# Patient Record
Sex: FEMALE | Race: WHITE | NOT HISPANIC OR LATINO | Employment: FULL TIME | URBAN - METROPOLITAN AREA
[De-identification: names, ages, dates, MRNs, and addresses within clinical notes are randomized per-mention and may not be internally consistent; named-entity substitution may affect disease eponyms.]

---

## 2017-01-22 ENCOUNTER — HOSPITAL ENCOUNTER (EMERGENCY)
Facility: CLINIC | Age: 32
Discharge: HOME | End: 2017-01-22
Attending: EMERGENCY MEDICINE | Admitting: EMERGENCY MEDICINE
Payer: COMMERCIAL

## 2017-01-22 VITALS
WEIGHT: 145 LBS | RESPIRATION RATE: 16 BRPM | DIASTOLIC BLOOD PRESSURE: 80 MMHG | BODY MASS INDEX: 24.75 KG/M2 | SYSTOLIC BLOOD PRESSURE: 125 MMHG | TEMPERATURE: 98.3 F | OXYGEN SATURATION: 100 % | HEART RATE: 110 BPM | HEIGHT: 64 IN

## 2017-01-22 DIAGNOSIS — S61.259A DOG BITE OF FINGER, INITIAL ENCOUNTER: ICD-10-CM
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
## 2017-01-22 DIAGNOSIS — L03.012 CELLULITIS OF FINGER OF LEFT HAND: ICD-10-CM

## 2017-01-22 PROCEDURE — 99283 EMERGENCY DEPT VISIT LOW MDM: CPT | Mod: Z6 | Performed by: EMERGENCY MEDICINE

## 2017-01-22 PROCEDURE — 99282 EMERGENCY DEPT VISIT SF MDM: CPT | Performed by: EMERGENCY MEDICINE

## 2017-01-22 ASSESSMENT — ENCOUNTER SYMPTOMS: WOUND: 1

## 2017-01-22 NOTE — ED AVS SNAPSHOT
[facility], Emergency Department    [address] [facility] [address]    Phone:  [phone]                                       Viktoria Guillen   MRN: [id number]    Department:  [facility], Emergency Department   Date of Visit:  1/22/2017           Patient Information     Date Of Birth          1985        Your diagnoses for this visit were:     Dog bite of finger, initial encounter     Cellulitis of finger of left hand        You were seen by Freddy Darnell MD.        Discharge Instructions       Start antibiotics as directed, warm soaks of the hand may be helpful  If you are not improving, you need to be seen again either in a clinic or the Emergency Department    24 Hour Appointment Hotline       To make an appointment at any [facility] clinic, call [phone] ([phone]). If you don't have a family doctor or clinic, we will help you find one. [facility] clinics are conveniently located to serve the needs of you and your family.             Review of your medicines      START taking        Dose / Directions Last dose taken    amoxicillin-clavulanate 875-125 MG per tablet   Commonly known as:  AUGMENTIN   Dose:  1 tablet   Quantity:  14 tablet        Take 1 tablet by mouth 2 times daily for 7 days   Refills:  0          Our records show that you are taking the medicines listed below. If these are incorrect, please call your family doctor or clinic.        Dose / Directions Last dose taken    AMBIEN PO   Dose:  10 mg   Indication:  Trouble Sleeping        Take 10 mg by mouth nightly as needed for sleep   Refills:  0        AMITRIPTYLINE HCL PO   Dose:  50 mg   Indication:  Depression        Take 50 mg by mouth daily   Refills:  0        NUVIGIL PO   Dose:  125 mg        Take 125 mg by mouth every morning   Refills:  0        PROPRANOLOL HCL PO   Dose:  10 mg        Take 10 mg by mouth as needed for high blood pressure   Refills:  0        XANAX PO   Dose:  0.5 mg   Indication:   "Feeling Anxious        Take 0.5 mg by mouth as needed for anxiety   Refills:  0                Prescriptions were sent or printed at these locations (1 Prescription)                   Other Prescriptions                Printed at Department/Unit printer (1 of 1)         amoxicillin-clavulanate (AUGMENTIN) 875-125 MG per tablet                Orders Needing Specimen Collection     None      Pending Results     No orders found from 2017 to 2017.            Pending Culture Results     No orders found from 2017 to 2017.            Thank you for choosing [facility]       Thank you for choosing [facility] for your care. Our goal is always to provide you with excellent care. Hearing back from our patients is one way we can continue to improve our services. Please take a few minutes to complete the written survey that you may receive in the mail after you visit with us. Thank you!        Daily Deals for [employer] Information     [employer] lets you send messages to your doctor, view your test results, renew your prescriptions, schedule appointments and more. To sign up, go to [email or web address] . Click on \"Log in\" on the left side of the screen, which will take you to the Welcome page. Then click on \"Sign up Now\" on the right side of the page.     You will be asked to enter the access code listed below, as well as some personal information. Please follow the directions to create your username and password.     Your access code is: [email or web address]  Expires: 2017  1:02 PM     Your access code will  in 90 days. If you need help or a new code, please call your [facility] clinic or [phone].        Care EveryWhere ID     This is your Care EveryWhere ID. This could be used by other organizations to access your [facility] medical records  [id number]        After Visit Summary       This is your record. Keep this with you and show to your community pharmacist(s) and doctor(s) at your next visit.                  "

## 2017-01-22 NOTE — ED AVS SNAPSHOT
[facility], [place], Emergency Department    [address] [place] [address]    Phone:  [phone]                                       Viktoria Guillen   MRN: [id number]    Department:  [facility], Emergency Department   Date of Visit:  1/22/2017           After Visit Summary Signature Page     I have received my discharge instructions, and my questions have been answered. I have discussed any challenges I see with this plan with the nurse or doctor.    ..........................................................................................................................................  Patient/Patient Representative Signature      ..........................................................................................................................................  Patient Representative Print Name and Relationship to Patient    ..................................................               ................................................  Date                                            Time    ..........................................................................................................................................  Reviewed by Signature/Title    ...................................................              ..............................................  Date                                                            Time

## 2017-01-22 NOTE — DISCHARGE INSTRUCTIONS
Start antibiotics as directed, warm soaks of the hand may be helpful  If you are not improving, you need to be seen again either in a clinic or the Emergency Department

## 2017-01-22 NOTE — ED NOTES
Arrived to ED d/t dog bite, was walking her dog and another dog jumped out of a window from a home and attacked her and her dog yesterday, bit her on the left thumb and third finger, tetanus shot last in 2012, per animal control and owner dog's rabbies vaccine up-to-date, VSS upon arrival

## 2017-01-22 NOTE — ED PROVIDER NOTES
"  History     Chief Complaint   Patient presents with     Dog Bite     HPI  Viktoria Guillen is a 31 year old otherwise healthy female who presents for evaluation of a dog bite. Patient reports she was walking her dog yesterday when another dog in the apartment complex jumped from a window and attacked her dog. She states she got in the middle of the dog fight because the other dog was significantly larger than hers. She complains of multiple abrasions to the dorsum and palmar aspect of left thumb as well as the dorsum of the second, third, and fourth digits with appearance of infection to the distal part of digits four and five. She also has a small non-infected abrasion on the dorsum of her right fifth digit. She denies allergies to medications. She is left hand dominant.     I have reviewed the Medications, Allergies, Past Medical and Surgical History, and Social History in the Epic system.  No past medical history on file.    No past surgical history on file.    No family history on file.    Social History   Substance Use Topics     Smoking status: Not on file     Smokeless tobacco: Not on file     Alcohol Use: Not on file     No current facility-administered medications for this encounter.     Current Outpatient Prescriptions   Medication     AMITRIPTYLINE HCL PO     ALPRAZolam (XANAX PO)     Zolpidem Tartrate (AMBIEN PO)     Armodafinil (NUVIGIL PO)     PROPRANOLOL HCL PO     amoxicillin-clavulanate (AUGMENTIN) 875-125 MG per tablet      No Known Allergies    Review of Systems   Skin: Positive for wound (multiple abrasions w/infection to bilateral hands).       Physical Exam   BP: 125/80 mmHg  Pulse: 110  Temp: 98.3  F (36.8  C)  Resp: 16  Height: 162.6 cm (5' 4\")  Weight: 65.772 kg (145 lb)  SpO2: 100 %  Physical Exam   Constitutional: She is oriented to person, place, and time. She appears well-developed and well-nourished. No distress.   Musculoskeletal:        Hands:  Multiple minor abrasions/bite wounds to " the left hand with evidence for early cellulitis to the distal portion of #4 and 5.  No wound that needs irrigation or repair   Neurological: She is alert and oriented to person, place, and time.   Nursing note and vitals reviewed.      ED Course     Procedures       12:22 PM  The patient was seen and examined by Dr. Darnell in Room 15.            Labs Ordered and Resulted from Time of ED Arrival Up to the Time of Departure from the ED - No data to display    Assessments & Plan (with Medical Decision Making)   31 year old female with multiple minor dog bite wounds, abrasions to both hands, but primarily to the left. One of the injuries clearly has early cellulitis with unlikely abscess. At this point my recommendation is Augmentin, warm soaks, and follow up in clinic in the next few days to assure resolution. Return to the emergency department if not improving.     I have reviewed the nursing notes.    I have reviewed the findings, diagnosis, plan and need for follow up with the patient.    New Prescriptions    AMOXICILLIN-CLAVULANATE (AUGMENTIN) 875-125 MG PER TABLET    Take 1 tablet by mouth 2 times daily for 7 days       Final diagnoses:   Dog bite of finger, initial encounter   Cellulitis of finger of left hand   I, Lizbeth Carrasco, am serving as a trained medical scribe to document services personally performed by Jason Darnell MD, based on the provider's statements to me.   I, Jason Darnell MD, was physically present and have reviewed and verified the accuracy of this note documented by Lizbeth Carrasco.      1/22/2017   [facility], [place], EMERGENCY DEPARTMENT      Freddy Darnell MD  01/22/17 [id number]

## 2023-01-09 ENCOUNTER — TRANSFERRED RECORDS (OUTPATIENT)
Dept: HEALTH INFORMATION MANAGEMENT | Facility: CLINIC | Age: 38
End: 2023-01-09

## 2023-01-09 ENCOUNTER — MEDICAL CORRESPONDENCE (OUTPATIENT)
Dept: HEALTH INFORMATION MANAGEMENT | Facility: CLINIC | Age: 38
End: 2023-01-09

## 2023-01-25 ENCOUNTER — TRANSFERRED RECORDS (OUTPATIENT)
Dept: HEALTH INFORMATION MANAGEMENT | Facility: CLINIC | Age: 38
End: 2023-01-25
Payer: COMMERCIAL

## 2023-01-27 ENCOUNTER — TELEPHONE (OUTPATIENT)
Dept: PSYCHIATRY | Facility: CLINIC | Age: 38
End: 2023-01-27
Payer: COMMERCIAL

## 2023-01-27 NOTE — TELEPHONE ENCOUNTER
"PSYCHIATRY CLINIC PHONE INTAKE     SERVICES REQUESTED / INTERESTED IN          Med Management    Presenting Problem and Brief History                              What would you like to be seen for? (brief description):    Pt was seen at [facility], graduated from  in 2019, is employee there now.  Originally diagnosed with depression and anxiety at 14. Diagnosed with gastroparesis by ED last October. Pt undergoing diagnostic testing with GI to determine if that's what's going on. Mental Health was made worse by the medical issues, and patient struggles more with mental health during the winter. Pt saw a psychologist in 2018/19, had a psychiatrist at home in [place], stopped seeing them in 2018 when he retired. Pt was doing ok and just had meds refilled through GP after that. But now mental health needs attention again. Pt noticing same older symptoms coming back and getting worse. Pt had insomnia the whole time, describes depression as getting \"really bad again,\" sees this as related to health issues going on as well.    Pt had been taking THC delta-8 gummies for sleep, but GI thinks that might have to do with current stomach issues and asked her to stop taking them. Patient reports it takes her around 3 hours to fall asleep most nights without it.    meds - not on any medications currently. Was taking AMITRIPTYLINE daily and xanax prn, also used to take Ambien nightly. Pt not necessarily looking to get back on these just wants to get a functional sleep schedule and handle depression.     Have you received a mental health diagnosis? Yes   Which one (s): depression, anxiety  Is there any history of developmental delay?  No   Are you currently seeing a mental health provider?  No            Who / month last seen:  Saw therapist regularly in [place] in 2016, but not since then  Do you have mental health records elsewhere?  Yes  Will you sign a release so we can obtain them?  Yes    Have you ever been hospitalized for " psychiatric reasons?  No  Describe:  na    Do you have current thoughts of self-harm?  No    Do you currently have thoughts of harming others?  No    Do you have any safety concerns? No   If yes to these, offer to reach out to a  for follow up.      Substance Use History     Do you have any history of alcohol / illicit drug use?  Yes  Describe:  delta 8 gummies for sleep, occasional drinking but nothing habitual  Have you ever received treatment for this?  No    Describe:  na     Social History     Who is the patient's a guardian?  self     Name / number: self  Have you had an ACT team in last 12 months?  No  Describe: na  OK to leave a detailed voicemail?  Yes    Would you be interested in learning more about research opportunities for which you or your child may qualify? We can connect you with a team member for more information.  Yes  If yes, send an inbasket message to Imani Duffy    Medical/ Surgical History                                 There is no problem list on file for this patient.         Medications             Current Outpatient Medications   Medication Sig Dispense Refill     ALPRAZolam (XANAX PO) Take 0.5 mg by mouth as needed for anxiety       AMITRIPTYLINE HCL PO Take 50 mg by mouth daily       Armodafinil (NUVIGIL PO) Take 125 mg by mouth every morning       PROPRANOLOL HCL PO Take 10 mg by mouth as needed for high blood pressure       Zolpidem Tartrate (AMBIEN PO) Take 10 mg by mouth nightly as needed for sleep           DISPOSITION      Phone screen completed with patient and scheduled for JENNIFER Kay     Jennifer Grajeda

## 2023-02-07 ENCOUNTER — HOSPITAL ENCOUNTER (OUTPATIENT)
Dept: NUCLEAR MEDICINE | Facility: CLINIC | Age: 38
Setting detail: NUCLEAR MEDICINE
Discharge: HOME | End: 2023-02-07
Attending: PHYSICIAN ASSISTANT | Admitting: PHYSICIAN ASSISTANT
Payer: COMMERCIAL

## 2023-02-07 DIAGNOSIS — R11.2 NAUSEA AND VOMITING: ICD-10-CM

## 2023-02-07 PROCEDURE — 343N000001 HC RX 343

## 2023-02-07 PROCEDURE — A9541 TC99M SULFUR COLLOID: HCPCS

## 2023-02-07 PROCEDURE — 78264 GASTRIC EMPTYING IMG STUDY: CPT | Mod: 26 | Performed by: RADIOLOGY

## 2023-02-07 PROCEDURE — 78264 GASTRIC EMPTYING IMG STUDY: CPT

## 2023-02-07 RX ADMIN — Medication 2 MILLICURIE: at 08:01

## 2023-02-16 ENCOUNTER — VIRTUAL VISIT (OUTPATIENT)
Dept: PSYCHIATRY | Facility: CLINIC | Age: 38
End: 2023-02-16
Attending: PSYCHIATRY & NEUROLOGY
Payer: COMMERCIAL

## 2023-02-16 DIAGNOSIS — F51.05 INSOMNIA DUE TO OTHER MENTAL DISORDER: ICD-10-CM

## 2023-02-16 DIAGNOSIS — F33.9 MAJOR DEPRESSIVE DISORDER, RECURRENT EPISODE WITH ANXIOUS DISTRESS (H): ICD-10-CM

## 2023-02-16 DIAGNOSIS — F33.1 MAJOR DEPRESSIVE DISORDER, RECURRENT EPISODE, MODERATE (H): Primary | ICD-10-CM

## 2023-02-16 DIAGNOSIS — F99 INSOMNIA DUE TO OTHER MENTAL DISORDER: ICD-10-CM

## 2023-02-16 PROCEDURE — 90792 PSYCH DIAG EVAL W/MED SRVCS: CPT | Mod: 95 | Performed by: PSYCHIATRY & NEUROLOGY

## 2023-02-16 RX ORDER — PROPRANOLOL HYDROCHLORIDE 10 MG/1
10 TABLET ORAL 2 TIMES DAILY
Qty: 60 TABLET | Refills: 1 | Status: SHIPPED | OUTPATIENT
Start: 2023-02-16 | End: 2023-08-15

## 2023-02-16 RX ORDER — ZOLPIDEM TARTRATE 10 MG/1
10 TABLET ORAL
Qty: 30 TABLET | Refills: 1 | Status: SHIPPED | OUTPATIENT
Start: 2023-02-16 | End: 2023-04-26

## 2023-02-16 RX ORDER — BUPROPION HYDROCHLORIDE 150 MG/1
150 TABLET ORAL EVERY MORNING
Qty: 30 TABLET | Refills: 1 | Status: SHIPPED | OUTPATIENT
Start: 2023-02-16 | End: 2023-04-28

## 2023-02-16 ASSESSMENT — ANXIETY QUESTIONNAIRES
GAD7 TOTAL SCORE: 9
7. FEELING AFRAID AS IF SOMETHING AWFUL MIGHT HAPPEN: NOT AT ALL
IF YOU CHECKED OFF ANY PROBLEMS ON THIS QUESTIONNAIRE, HOW DIFFICULT HAVE THESE PROBLEMS MADE IT FOR YOU TO DO YOUR WORK, TAKE CARE OF THINGS AT HOME, OR GET ALONG WITH OTHER PEOPLE: VERY DIFFICULT
8. IF YOU CHECKED OFF ANY PROBLEMS, HOW DIFFICULT HAVE THESE MADE IT FOR YOU TO DO YOUR WORK, TAKE CARE OF THINGS AT HOME, OR GET ALONG WITH OTHER PEOPLE?: VERY DIFFICULT
2. NOT BEING ABLE TO STOP OR CONTROL WORRYING: MORE THAN HALF THE DAYS
6. BECOMING EASILY ANNOYED OR IRRITABLE: NOT AT ALL
GAD7 TOTAL SCORE: 9
1. FEELING NERVOUS, ANXIOUS, OR ON EDGE: MORE THAN HALF THE DAYS
3. WORRYING TOO MUCH ABOUT DIFFERENT THINGS: MORE THAN HALF THE DAYS
GAD7 TOTAL SCORE: 9
7. FEELING AFRAID AS IF SOMETHING AWFUL MIGHT HAPPEN: NOT AT ALL
4. TROUBLE RELAXING: NEARLY EVERY DAY
5. BEING SO RESTLESS THAT IT IS HARD TO SIT STILL: NOT AT ALL

## 2023-02-16 ASSESSMENT — PATIENT HEALTH QUESTIONNAIRE - PHQ9
SUM OF ALL RESPONSES TO PHQ QUESTIONS 1-9: 17
SUM OF ALL RESPONSES TO PHQ QUESTIONS 1-9: 17
10. IF YOU CHECKED OFF ANY PROBLEMS, HOW DIFFICULT HAVE THESE PROBLEMS MADE IT FOR YOU TO DO YOUR WORK, TAKE CARE OF THINGS AT HOME, OR GET ALONG WITH OTHER PEOPLE: VERY DIFFICULT

## 2023-02-16 NOTE — PROGRESS NOTES
Video- Visit Details  Type of service:  video visit for diagnostic assessment  Time of service:    Date:  02/16/2023    Video Start Time:  12:51 PM        Video End Time:  2:09 PM    Reason for video visit:  Patient unable to travel due to Covid-19  Originating Site (patient location):  [facility]   Location- Patient's home  Distant Site (provider location):  Remote location  Mode of Communication:  Video Conference via [employer]       [facility]  Psychiatry Clinic  PSYCHIATRY NEW PATIENT EVALUATION     CARE TEAM:  PCP- No Ref-Primary, Physician   GI- at [facility].  Viktoria is a 37 year old who prefers the name Viktoria and uses pronouns she, her.     DIAGNOSES                                                                                        Major Depressive Disorder with Anxious Distress  Insomnia    ASSESSMENT                                                                                          Viktoria Guillen is a 37 year old female with symptoms supporting a diagnosis of MAJOR DEPRESSIVE DISORDER with anxious distress. She reports experiencing low mood most of the day, every day. She describes a lack of interest in her previous hobbies, such as rivera and keeping house plants, and an inability to feel antonio in those activities. Overall, she describes her emotions as muted. She describes an unintentional weight loss of 20 pounds since October 2022. She reports nightly insomnia. She describes feeling a lack of motivation and energy to accomplish tasks, such as making a dental appointment or doing housework, yet feels agitated and anxious most days, especially upon waking. While she continues to perform the tasks of her job, she is experiencing forgetfulness and difficulty concentrating at work and at home. She denies suicidal ideation or self-injurious thoughts. Viktoria has tried many medications since the age of 14, when she was first diagnosed with clinical depression. Based on what she remembers  "from past medication trials, will reinitiate medications that have previously worked well for her and without side effects at today's visit.     [place] was [place] today:  Indicates patient has not filled a controlled substance since February 2022..    PLAN                                                                                                       1) Meds    START Bupropion (Wellbutrin)  mg daily in the morning  START Propranolol 10 mg twice daily as needed for anxiety/panic (avoid taking if you feel lightheaded or dehydrated due to ongoing GI upset)  START Ambien 10 mg at bedtime as needed for sleep     2) Other:    Therapy- Recommend starting when you are able. Find a therapist in your network at [place] [facility]: [email or web address]    Labs- Per patient request, can defer labs until GI appointment in 2 weeks at [facility]. Recommended patient ask their GI provider to add on the following labs:  Folate   B12  Thyroid-stimulating Hormone  Vitamin D  Ferritin      3) RTC: In 3 weeks.    4) Crisis numbers are below and clinic after hours number is [phone]      CHIEF COMPLAINT                                      \" My depression just keeps worsening, and I need to feel better. \"   PERTINENT BACKGROUND                                         [initial eval 02/16/23]     Viktoria Guillen is a 37 year old female with a significant history of depressive episodes starting at age 14. She reports many past medication trials, as well as a long history of working with a psychotherapist. She has had several episodes of suicidal ideation over the years, with the most significant occurring in 2015. At that time, she had no attempts or concrete plan, but had daily intrusive SI thoughts. In 2013 she had one episode of self-injury that involved cutting her wrist with a steak knife. She reports many episodes of depression over the past 23 years. She also reports a history of anxiety.     Psych " "pertinent item history includes SIB     HISTORY OF PRESENT ILLNESS                                                      Most recent history began in October when she became physically quite ill. She reports daily gastrointestinal symptoms such as nausea and vomiting. She is still being worked up for this under the care of a gastroenterologist at [facility]. Along with the physical symptoms came increasingly more severe symptoms of depression and anxiety. She reports muted emotions, lack of motivation, difficulty concentrating and performing tasks at work. Her anxiety is worst in the morning upon waking. It is exacerbated by physical symptoms. She describes waking up feeling overwhelmed almost daily. She notices her heart racing in the mornings and generally feeling fidgety throughout the day. She reports regular episodes of panic. During these episodes, she experiences racing thoughts, shaking, crying, and feeling trapped. There is no clear trigger other than thinking about all the things she has been unable to accomplish due to her depressive symptoms.     Recent Symptoms:   Depression:  depressed mood, anhedonia, low energy, insomnia, appetite changes, weight changes, poor concentration /memory, feeling trapped and overwhelmed  Anxiety:  excessive worry and nervous/overwhelmed  Panic Attack:  peaks in < several  mins, occurs 2x per week, triggers are known, palpitations, diaphoresis and tremors    Adverse Med Effects:  See table below  Pertinent Negatives:  No   Recent Substance Use:    None reported    SOCIAL and FAMILY HISTORY                                                 per pt report         Family Hx:  Not obtained at this visit.     Social Hx:  Social Support - good social support from girlfriend \"great relationship\"    PAST PSYCH and SUBSTANCE USE HISTORY                      Psych:  Suicidal ideation - Past, none currently   SIB - One prior episode 2013, none currently      Substance Use:  No " additional substance use history.    MEDICAL HISTORY     There is no problem list on file for this patient.    Has been physically ill since October. Unintentional 20 pound weight loss since October. GI specialist through [facility].     [place] Provider: Alexandra (not PCP).    ALLERGIES: Patient has no known allergies.     MEDICAL REVIEW OF SYSTEMS                                                                  none in addition to that documented above    CURRENT MEDS       Current Outpatient Medications   Medication Sig Dispense Refill     buPROPion (WELLBUTRIN XL) 150 MG 24 hr tablet Take 1 tablet (150 mg) by mouth every morning for 60 days 30 tablet 1     ondansetron (ZOFRAN ODT) 4 MG ODT tab Take 4 mg by mouth every 6 hours as needed for nausea       pantoprazole (PROTONIX) 40 MG EC tablet Take 40 mg by mouth daily       propranolol (INDERAL) 10 MG tablet Take 1 tablet (10 mg) by mouth 2 times daily for 30 days 60 tablet 1     zolpidem (AMBIEN) 10 MG tablet Take 1 tablet (10 mg) by mouth nightly as needed for sleep 30 tablet 1     PROPRANOLOL HCL PO Take 10 mg by mouth as needed for high blood pressure (Patient not taking: Reported on 2/16/2023)       Zolpidem Tartrate (AMBIEN PO) Take 10 mg by mouth nightly as needed for sleep (Patient not taking: Reported on 2/16/2023)          Psychotropic Medication Trials      Medication Max Dose (mg) Dates / Duration Helpful? DC Reason / Adverse Effects?   Zoloft (sertraline) Unknown Unknown No None   Paxil (paroxetine) Unknown Unknown No Cause SI at the age of 14   Celexa (citalopram) Unknown Unknown No Full body rash/hives in patient's 20s    Lexapro (escitalopram) Unknown Unknown No None   Wellbutrin  (bupropion) 100mg IR Unknown Yes None   Remeron (mirtazapine) Unknown Unknown Yes Weight gain of 30 pounds in one month   Viibryd (vilazodone) Unknown Unknown Yes Reported long withdrawal    Elavil (amitriptyline) 25-50mg Unknown Yes Somewhat helpful at 25mg; described  a strange sensation of mental fog/slowing, but not sedation at 50mg   Abilify (aripriprazole) Unknown Unknown No Reports sedation     Neurontin (gabapentin) 300mg Unknown Unsure Unsure   Inderal (propranolol) 10mg Unknown Yes None   Ambien (zolpidem) 10mg Unknown Yes None   Melatonin Unknown Unknown No None   Xanax (alprazolam) Unknown Unknown Yes None   Vistaril / Atarax (hydroxyzine) 50mg Unknown No Used for sleep, cause some drowsiness, but not enough for sleep onset       VITALS                                                                                              There were no vitals taken for this visit.   MENTAL STATUS EXAM                                                             Alertness: alert   Appearance: adequately groomed  Behavior/Demeanor: cooperative, with good  eye contact   Speech: regular rate and rhythm  Language: intact  Psychomotor: normal or unremarkable  Mood: depressed  Affect: flat; congruent to: mood- yes, content- yes  Thought Process/Associations: unremarkable  Thought Content:  Reports none;  Denies suicidal & violent ideation and delusions  Perception:  Reports none;  Denies hallucinations  Insight: adequate  Judgment: appropriate  Cognition: does  appear grossly intact; formal cognitive testing was not done  Gait and Station: N/A (telehealth)    LABS and DATA     No flowsheet data found.    Answers for HPI/ROS submitted by the patient on 2/16/2023  If you checked off any problems, how difficult have these problems made it for you to do your work, take care of things at home, or get along with other people?: Very difficult  PHQ9 TOTAL SCORE: 17  LUBNA 7 TOTAL SCORE: 9      PSYCHOTROPIC DRUG INTERACTIONS   none     MANAGEMENT:  N/A    RISK STATEMENT for SAFETY   Viktoria did not appear to be an imminent safety risk to self or others.    TREATMENT RISK STATEMENT:  The risks, benefits, alternatives and potential adverse effects have been discussed and are understood by the pt. The pt  understands the risks of using street drugs or alcohol. There are no medical contraindications, the pt agrees to treatment with the ability to do so. The pt knows to call the clinic for any problems or to access emergency care if needed.  Medical and substance use concerns are documented above.  Psychotropic drug interaction check was done, including changes made today.    PROVIDER:  BENITO Cho CNP       MEDICAL DECISION MAKING        [facility]     Level of Medical Decision Making:   - At least 1 chronic problem that is not stable  - Engaged in prescription drug management during visit (discussed any medication benefits, side effects, alternatives, etc.)  Number of unique external sources from which notes were reviewed: 1 - CareEverywhere information from [facility]  reviewed

## 2023-02-16 NOTE — PROGRESS NOTES
Viktoria Guillen is a 37 year old who is being evaluated via a billable video visit.      Pt will join video visit via: Lalalama  If there are problems joining the visit, send backup video invite via: Text to preferred phone: [phone]    Reason for telehealth visit: Patient has requested telehealth visit    Originating location (patient location): Patient's home    Will anyone else be joining the visit? No

## 2023-02-16 NOTE — NURSING NOTE
Is the patient currently in the state of [place]? YES    Visit mode:VIDEO    If the visit is dropped, the patient can be reconnected by: VIDEO VISIT: Text to cell phone: [phone]    Will anyone else be joining the visit? NO      How would you like to obtain your AVS? MyChart    Are changes needed to the allergy or medication list? YES: Pt is taking Protonix and Zofran only    Comments or concerns regarding today's visit: New Patient visit

## 2023-02-17 RX ORDER — ONDANSETRON 4 MG/1
4 TABLET, ORALLY DISINTEGRATING ORAL EVERY 6 HOURS PRN
COMMUNITY
Start: 2023-01-06

## 2023-02-17 RX ORDER — PANTOPRAZOLE SODIUM 40 MG/1
40 TABLET, DELAYED RELEASE ORAL DAILY
COMMUNITY
Start: 2023-01-26 | End: 2024-07-30

## 2023-02-17 NOTE — PATIENT INSTRUCTIONS
Treatment Plan Today:     1) Meds    START Bupropion (Wellbutrin)  mg daily in the morning  START Propranolol 10 mg twice daily as needed for anxiety/panic (avoid taking if you feel lightheaded or dehydrated due to ongoing GI upset)  START Ambien 10 mg at bedtime as needed for sleep     2) Other:    Therapy- Recommend starting when you are able. Find a therapist in your network at [place] Fast Tracker: [email or web address]    Labs- Per your request, can defer labs until GI appointment in 2 weeks at [facility]. Recommend you have the following labs drawn. These will need to be ordered by your [facility] provider and results shared. If they are unwilling to order these, please let me know, and I will order them in the [facility] system to be drawn before our next appointment.  Folate   B12  Thyroid-stimulating Hormone  Vitamin D  Ferritin    Check blood pressure either at the pharmacy or [facility] and send me the result via Mindlikes. Discontinue propranolol if you are experiencing low blood pressure (below 100/60), dehydration or lightheadedness.    3) RTC: In 3 weeks.    4) Crisis numbers are below and clinic after hours number is [phone]     ------------------------------------------------------------------------    Thank you for coming to the [facility]    Lab Testing:  If you had lab testing today and your results are reassuring or normal they will be mailed to you or sent through Mindlikes within 7 days. If the lab tests need quick action we will call you with the results. The phone number we will call with results is # [phone] (home) . If this is not the best number please call our clinic and change the number.    Medication Refills:  If you need any refills please call your pharmacy and they will contact us. Our fax number for refills is [phone].   Three business days of notice are needed for general medication refill requests.   Five business days  of notice are needed for controlled substance refill requests.   If you need to change to a different pharmacy, please contact the new pharmacy directly. The new pharmacy will help you get your medications transferred.     Scheduling:  If you have any concerns about today's visit or wish to schedule another appointment please call our office during normal business hours [phone] (8-5:00 M-F)    Contact Us:  Please call [phone] during business hours (8-5:00 M-F).  If after clinic hours, or on the weekend, please call  [phone].    Patient Bill of Rights:  [email or web address]       MENTAL HEALTH CRISIS RESOURCES:  For a emergency help, please call [phone] or go to the nearest Emergency Department.     Emergency Walk-In Options:   EmPATH Unit @ [facility] ([place]): [phone] - Specialized mental health emergency area designed to be calming  [facility] ([place]): [phone]  [facility] ([place]): [phone]  [facility]): [phone]    [facility] Crisis Information:   [place]: [phone]  [place]: [phone]  [place] ([place]) - Adult: [phone]     Child: [phone]  [place] - Adult: [phone]     Child: [phone]  [place]: [phone]  List of all [facility] resources:   https://[place].gov/dhs/people-we-serve/adults/health-care/mental-health/resources/crisis-contacts.jsp    National Crisis Information:   Crisis Text Line: Text  [place]  to 948860  National Suicide Prevention Lifeline: 5-469-100-DPTU (1-266.156.4233)       For online chat options, visit https://suicidepreventionlifeline.org/chat/  Poison Control Center: 1-647.961.6204  Trans Lifeline: 1-815.333.7012 - Hotline for transgender people of all ages  The Pacheco Project: 4-945-616-6678 - Hotline for LGBT youth     For Non-Emergency Support:   Fast Tracker: Mental Health & Substance Use Disorder  Resources -   https://www.P[employer]n.org/       Again thank you for choosing [facility] and please let us know how we can best partner with you to improve you and your family's health.    You may be receiving a survey regarding this appointment. We would love to have your feedback, both positive and negative. The survey is done by an external company, so your answers are anonymous.

## 2023-04-26 ENCOUNTER — MYC REFILL (OUTPATIENT)
Dept: PSYCHIATRY | Facility: CLINIC | Age: 38
End: 2023-04-26
Payer: COMMERCIAL

## 2023-04-26 DIAGNOSIS — F51.05 INSOMNIA DUE TO OTHER MENTAL DISORDER: ICD-10-CM

## 2023-04-26 DIAGNOSIS — F99 INSOMNIA DUE TO OTHER MENTAL DISORDER: ICD-10-CM

## 2023-04-26 NOTE — TELEPHONE ENCOUNTER
Last seen: 02/16/2023  RTC: 3 weeks  Cancel: None  No-show: None  Next appt: 05/16/2023     Incoming refill from Patient via [employer]    Medication requested:   Pending Prescriptions:                       Disp   Refills    zolpidem (AMBIEN) 10 MG tablet            30 tab*1            Sig: Take 1 tablet (10 mg) by mouth nightly as needed           for sleep        Last refill per       From chart note:   START Ambien 10 mg at bedtime as needed for sleep      Medication sent to provider for review.

## 2023-04-27 RX ORDER — ZOLPIDEM TARTRATE 10 MG/1
10 TABLET ORAL
Qty: 30 TABLET | Refills: 0 | Status: SHIPPED | OUTPATIENT
Start: 2023-04-27 | End: 2023-06-16

## 2023-04-28 ENCOUNTER — MYC REFILL (OUTPATIENT)
Dept: PSYCHIATRY | Facility: CLINIC | Age: 38
End: 2023-04-28
Payer: COMMERCIAL

## 2023-04-28 DIAGNOSIS — F33.9 MAJOR DEPRESSIVE DISORDER, RECURRENT EPISODE WITH ANXIOUS DISTRESS (H): ICD-10-CM

## 2023-04-28 RX ORDER — BUPROPION HYDROCHLORIDE 150 MG/1
150 TABLET ORAL EVERY MORNING
Qty: 30 TABLET | Refills: 0 | Status: SHIPPED | OUTPATIENT
Start: 2023-04-28 | End: 2023-08-15

## 2023-04-28 NOTE — TELEPHONE ENCOUNTER
Last seen: 2/16/23  RTC: 3 weeks  Cancel: none  No-show: none  Next appt: 5/16/23     Incoming refill from Patient via [employer]    Medication requested:   Pending Prescriptions:                       Disp   Refills    buPROPion (WELLBUTRIN XL) 150 MG 24 hr ta*30 tab*1            Sig: Take 1 tablet (150 mg) by mouth every morning      From chart note:   START Bupropion (Wellbutrin)  mg daily in the morning     Medication refill approved per refill protocol. Courtesy refill as pt is out of RTC timeframe.        Patient tolerated procedure well. Wound class 1. Dressing clean dry and intact. Denies pain or discomfort.

## 2023-05-06 ENCOUNTER — HEALTH MAINTENANCE LETTER (OUTPATIENT)
Age: 38
End: 2023-05-06

## 2023-05-07 ENCOUNTER — MYC REFILL (OUTPATIENT)
Dept: PSYCHIATRY | Facility: CLINIC | Age: 38
End: 2023-05-07
Payer: COMMERCIAL

## 2023-05-07 DIAGNOSIS — F33.9 MAJOR DEPRESSIVE DISORDER, RECURRENT EPISODE WITH ANXIOUS DISTRESS (H): ICD-10-CM

## 2023-05-08 RX ORDER — BUPROPION HYDROCHLORIDE 150 MG/1
150 TABLET ORAL EVERY MORNING
Qty: 30 TABLET | Refills: 0 | OUTPATIENT
Start: 2023-05-08

## 2023-05-08 NOTE — TELEPHONE ENCOUNTER
Bupropion (Wellbutrin XL) 150 mg last refilled on 4/28/23. Refill declined as too soon to refill.  [email or web address] message sent to pt with update. Next visit scheduled for 5/16/23.

## 2023-05-12 NOTE — TELEPHONE ENCOUNTER
Writer spoke with [id number] - [place], [place] - [address] AT [facility] [address] and was told that the refill of bupropion 150mg that was sent on 4/28/23 hadn't yet been filled. Instructed pharmacy to fill medication.

## 2023-05-16 ENCOUNTER — VIRTUAL VISIT (OUTPATIENT)
Dept: PSYCHIATRY | Facility: CLINIC | Age: 38
End: 2023-05-16
Attending: PSYCHIATRY & NEUROLOGY
Payer: COMMERCIAL

## 2023-05-16 DIAGNOSIS — F33.9 MAJOR DEPRESSIVE DISORDER, RECURRENT EPISODE WITH ANXIOUS DISTRESS (H): Primary | ICD-10-CM

## 2023-05-16 PROCEDURE — 99214 OFFICE O/P EST MOD 30 MIN: CPT | Mod: VID | Performed by: PSYCHIATRY & NEUROLOGY

## 2023-05-16 RX ORDER — BUPROPION HYDROCHLORIDE 300 MG/1
300 TABLET ORAL EVERY MORNING
Qty: 30 TABLET | Refills: 11 | Status: SHIPPED | OUTPATIENT
Start: 2023-05-16 | End: 2023-11-21

## 2023-05-16 ASSESSMENT — PATIENT HEALTH QUESTIONNAIRE - PHQ9
SUM OF ALL RESPONSES TO PHQ QUESTIONS 1-9: 15
10. IF YOU CHECKED OFF ANY PROBLEMS, HOW DIFFICULT HAVE THESE PROBLEMS MADE IT FOR YOU TO DO YOUR WORK, TAKE CARE OF THINGS AT HOME, OR GET ALONG WITH OTHER PEOPLE: VERY DIFFICULT
SUM OF ALL RESPONSES TO PHQ QUESTIONS 1-9: 15

## 2023-05-16 NOTE — PATIENT INSTRUCTIONS
PLAN                                                                                                       1) Meds    INCREASE Bupropion (Wellbutrin) XL to 300 mg daily in the morning  CONTINUE Propranolol 10 mg twice daily as needed for anxiety/panic (avoid taking if you feel lightheaded or dehydrated due to ongoing GI upset)  CONTINUE  Ambien 10 mg at bedtime as needed for sleep     2) Other:  Labs- Ordered in the [facility]. You can have them drawn at any [facility] lab or request your GI provider to order them through [facility]. Recommended the following labs:  Folate   B12  Thyroid-stimulating Hormone  Vitamin D  Ferritin    3) RTC: In 8-12 weeks.    4) Crisis numbers listed below: For crisis resources, please see the information at the end of this document.    Patient Education      Thank you for coming to the [facility] [place] [facility].     Lab Testing:  If you had lab testing today and your results are reassuring or normal they will be mailed to you or sent through [employer] within 7 days. If the lab tests need quick action we will call you with the results. The phone number we will call with results is # [phone]. If this is not the best number please call our clinic and change the number.     Medication Refills:  If you need any refills please call your pharmacy and they will contact us. Our fax number for refills is [phone].   Three business days of notice are needed for general medication refill requests.   Five business days of notice are needed for controlled substance refill requests.   If you need to change to a different pharmacy, please contact the new pharmacy directly. The new pharmacy will help you get your medications transferred.     Contact Us:  Please call [phone] during business hours (8-5:00 M-F).   If you have medication related questions after clinic hours, or on the weekend, please call [phone].     Financial Assistance  [phone]   Medical Records [phone]       MENTAL HEALTH CRISIS RESOURCES:  For a emergency help, please call 911 or go to the nearest Emergency Department.     Emergency Walk-In Options:   EmPATH Unit @ [facility] ([place]): [phone] - Specialized mental health emergency area designed to be calming  [facility] ([place]): [phone]  [facility] Acute Psychiat[facility] Services ([place]): [phone]  [facility] ([place]): [phone]    [facility] Crisis Information:   [place]: [phone]  [place]: [phone]  [place] ([place]) - Adult: [phone]     Child: [phone]  [place] - Adult: [phone]     Child: [phone]  [place]: [phone]  List of all [facility] resources:   https://[place].[facility]/dhs/people-we-serve/adults/health-care/mental-health/resources/crisis-contacts.jsp    National Crisis Information:   Crisis Text Line: Text  [place]  to 127779  Suicide & Crisis Lifeline: 988  National Suicide Prevention Lifeline: 6-304-729-TALK (1-668.437.1354)       For online chat options, visit https://suicidepreventionlifeline.org/chat/  Poison Control Center: 1-539.783.2881  Trans Lifeline: 1-179.547.1301 - Hotline for transgender people of all ages  The Pacheco Project: 4-402-237-5127 - Hotline for LGBT youth     For Non-Emergency Support:   Fast Tracker: Mental Health & Substance Use Disorder Resources -   https://www.[employer].org/

## 2023-05-16 NOTE — PROGRESS NOTES
[facility]  Psychiatry Clinic  PSYCHIATRY PROGRESS NOTE     CARE TEAM:  PCP- No Ref-Primary, Physician   GI- at [facility].  Viktoria is a 37 year old who prefers the name Viktoria and uses pronouns she, her.     DIAGNOSES                                                                                        Major Depressive Disorder with Anxious Distress  Insomnia    ASSESSMENT                                                                                          Viktoria Guillen is a 37 year old female with symptoms supporting a diagnosis of MAJOR DEPRESSIVE DISORDER with anxious distress. While her mood has improved, she is still experiencing low mood most every day. She has had some increase of interest in her previous hobbies, such gardening, but finds it difficult to see projects through due to her fatigue. She continues to describe her emotions as muted. Sleep and nutrition have improved. It appears that Wellbutrin  mg has improved her mood somewhat,with room for more therapeutic benefit. Her anxiety has remained consistent if not improved. Will increase Wellbutrin XL to 300 mg and order labs which were not drawn since the last appointment.     MNPMP was checked today:  Indicates taking controlled medication as prescribed.    PLAN                                                                                                       1) Meds    INCREASE Bupropion (Wellbutrin) XL to 300 mg daily in the morning  CONTINUE Propranolol 10 mg twice daily as needed for anxiety/panic (avoid taking if you feel lightheaded or dehydrated due to ongoing GI upset)  CONTINUE  Ambien 10 mg at bedtime as needed for sleep     2) Other:    Labs- Ordered in the [facility]. You can have them drawn at any [facility] lab or request your GI provider to order them through [facility]. Recommended the following labs:  Folate   B12  Thyroid-stimulating Hormone  Vitamin D  Ferritin      3) RTC: In 8-12  "weeks.    4) Crisis numbers in AVS.     CHIEF COMPLAINT                                      \" My mental health is starting to feel better, but I'm continuing to manage physical health issues. \"     PERTINENT BACKGROUND                                         [initial eval 02/16/23]     Viktoria Guillen is a 37 year old female with a significant history of depressive episodes starting at age 14. She reports many past medication trials, as well as a long history of working with a psychotherapist. She has had several episodes of suicidal ideation over the years, with the most significant occurring in 2015. At that time, she had no attempts or concrete plan, but had daily intrusive SI thoughts. In 2013 she had one episode of self-injury that involved cutting her wrist with a steak knife. She reports many episodes of depression over the past 23 years. She also reports a history of anxiety.     Psych pertinent item history includes SIB     HISTORY OF PRESENT ILLNESS                                                      Definitely notice a difference with Wellbutrin. Getting up and having more energy to do things. Still feels more apathetic than she would like to be. Reports feeling somewhat emotionally numb to certain things. Some increase physical and mental fatigue over the last few weeks, including muscle fatigue and weakness. Sleep has much improved since the last appointment. She reports using the Ambien intermittently, which is helpful. She uses it approximately three times per week. Nutrition has improved. She continues to take in most of her meals as liquid. Drinking Jameson Instant Breakfast or Ensure Plus for meals. Also eats yogurt and pasta. Walks around during her work shifts, but does not participate in intentional movement. Still fatigues easily when she has a big activity. Denies panic attacks since the last appointment, but does endorse intermittent anxiety- mostly related to frustration with her health. " "Does not contribute any increase in anxiety to the Wellbutrin. Has used propranolol for anxiety on occasion. States \"it definitely helps\". Reports significant improvement in sleep. Reports using Ambien sparingly, and finding it helpful. Had some labs checked at [facility], but not all labs that were previously indicated.     Recent Symptoms:   Depression:  depressed mood, low energy, insomnia, feeling trapped and overwhelmed- some improvements in mood and energy since last visit  Anxiety:  excessive worry and nervous/overwhelmed  Panic Attack:  peaks in < several  mins, occurs 2x per week, triggers are known, palpitations, diaphoresis and tremors - none since last visit    Adverse Med Effects:  See table below  Pertinent Negatives:  No   Recent Substance Use:    None reported    SOCIAL and FAMILY HISTORY                                                 per pt report         Family Hx:  Not obtained at this visit.     Social Hx:  Social Support - good social support from girlfriend \"great relationship\"    PAST PSYCH and SUBSTANCE USE HISTORY                      Psych:  Suicidal ideation - Past, none currently   SIB - One prior episode 2013, none currently      Substance Use:  No additional substance use history.    MEDICAL HISTORY     There is no problem list on file for this patient.    Has been physically ill since October. Unintentional 20 pound weight loss since October. GI specialist through [facility].     [place] Provider: Alexandra (not PCP).    ALLERGIES: Patient has no known allergies.     MEDICAL REVIEW OF SYSTEMS                                                                  none in addition to that documented above    CURRENT MEDS       Current Outpatient Medications   Medication Sig Dispense Refill     buPROPion (WELLBUTRIN XL) 150 MG 24 hr tablet Take 1 tablet (150 mg) by mouth every morning 30 tablet 0     ondansetron (ZOFRAN ODT) 4 MG ODT tab Take 4 mg by mouth every 6 hours as needed for nausea       " pantoprazole (PROTONIX) 40 MG EC tablet Take 40 mg by mouth daily       zolpidem (AMBIEN) 10 MG tablet Take 1 tablet (10 mg) by mouth nightly as needed for sleep 30 tablet 0     propranolol (INDERAL) 10 MG tablet Take 1 tablet (10 mg) by mouth 2 times daily for 30 days 60 tablet 1     PROPRANOLOL HCL PO Take 10 mg by mouth as needed for high blood pressure (Patient not taking: Reported on 5/16/2023)       Zolpidem Tartrate (AMBIEN PO) Take 10 mg by mouth nightly as needed for sleep (Patient not taking: Reported on 5/16/2023)          Psychotropic Medication Trials      Medication Max Dose (mg) Dates / Duration Helpful? DC Reason / Adverse Effects?   Zoloft (sertraline) Unknown Unknown No None   Paxil (paroxetine) Unknown Unknown No Cause SI at the age of 14   Celexa (citalopram) Unknown Unknown No Full body rash/hives in patient's 20s    Lexapro (escitalopram) Unknown Unknown No None   Wellbutrin  (bupropion) 100mg IR Unknown Yes None   Remeron (mirtazapine) Unknown Unknown Yes Weight gain of 30 pounds in one month   Viibryd (vilazodone) Unknown Unknown Yes Reported long withdrawal    Elavil (amitriptyline) 25-50mg Unknown Yes Somewhat helpful at 25mg; described a strange sensation of mental fog/slowing, but not sedation at 50mg   Abilify (aripriprazole) Unknown Unknown No Reports sedation     Neurontin (gabapentin) 300mg Unknown Unsure Unsure   Inderal (propranolol) 10mg Unknown Yes None   Ambien (zolpidem) 10mg Unknown Yes None   Melatonin Unknown Unknown No None   Xanax (alprazolam) Unknown Unknown Yes None   Vistaril / Atarax (hydroxyzine) 50mg Unknown No Used for sleep, cause some drowsiness, but not enough for sleep onset       VITALS                                                                                              There were no vitals taken for this visit.   MENTAL STATUS EXAM                                                             Alertness: alert   Appearance: adequately  groomed  Behavior/Demeanor: cooperative, with good  eye contact   Speech: regular rate and rhythm  Language: intact  Psychomotor: normal or unremarkable  Mood: depressed  Affect: flat; congruent to: mood- yes, content- yes  Thought Process/Associations: unremarkable  Thought Content:  Reports none;  Denies suicidal & violent ideation and delusions  Perception:  Reports none;  Denies hallucinations  Insight: adequate  Judgment: appropriate  Cognition: does  appear grossly intact; formal cognitive testing was not done  Gait and Station: N/A (telehealth)    LABS and DATA         2/16/2023    12:58 PM 5/16/2023     7:42 AM   PHQ   PHQ-9 Total Score 17 15   Q9: Thoughts of better off dead/self-harm past 2 weeks Not at all Not at all       Answers for HPI/ROS submitted by the patient on 2/16/2023  If you checked off any problems, how difficult have these problems made it for you to do your work, take care of things at home, or get along with other people?: Very difficult  PHQ9 TOTAL SCORE: 17  LUBNA 7 TOTAL SCORE: 9      PSYCHOTROPIC DRUG INTERACTIONS   none     MANAGEMENT:  N/A    RISK STATEMENT for SAFETY   Viktoria did not appear to be an imminent safety risk to self or others.    TREATMENT RISK STATEMENT:  The risks, benefits, alternatives and potential adverse effects have been discussed and are understood by the pt. The pt understands the risks of using street drugs or alcohol. There are no medical contraindications, the pt agrees to treatment with the ability to do so. The pt knows to call the clinic for any problems or to access emergency care if needed.  Medical and substance use concerns are documented above.  Psychotropic drug interaction check was done, including changes made today.    PROVIDER:  BENITO Cho CNP       MEDICAL DECISION MAKING        (Daniel .PSYCHBILLMDM)     Level of Medical Decision Making:   - At least 1 chronic problem that is not stable  - Engaged in prescription drug management  during visit (discussed any medication benefits, side effects, alternatives, etc.)  Number of unique external sources from which notes were reviewed: 1 - CareEverywhere information from [facility]  reviewed

## 2023-05-16 NOTE — NURSING NOTE
Is the patient currently in the state of [place]? YES    Visit mode:VIDEO    If the visit is dropped, the patient can be reconnected by: VIDEO VISIT: Send to e-mail at: [email or web address]    Will anyone else be joining the visit? NO      How would you like to obtain your AVS? MyChart    Are changes needed to the allergy or medication list? NO    Reason for visit: Video Visit

## 2023-05-16 NOTE — PROGRESS NOTES
Virtual Visit Details    Type of service:  Video Visit     Originating Location (pt. Location): Other Work in [place]  Distant Location (provider location):  On-site  Platform used for Video Visit: Shereen

## 2023-06-16 ENCOUNTER — MYC REFILL (OUTPATIENT)
Dept: PSYCHIATRY | Facility: CLINIC | Age: 38
End: 2023-06-16
Payer: COMMERCIAL

## 2023-06-16 DIAGNOSIS — F99 INSOMNIA DUE TO OTHER MENTAL DISORDER: ICD-10-CM

## 2023-06-16 DIAGNOSIS — F51.05 INSOMNIA DUE TO OTHER MENTAL DISORDER: ICD-10-CM

## 2023-06-16 NOTE — TELEPHONE ENCOUNTER
Last Seen 5/16/23  RTC 8-12 weeks  Cancel 0  No-Show 0    Next Appt none    Incoming Refill From pt request via MyChart    Medication Requested   zolpidem (AMBIEN) 10 MG tablet    Directions   Route: Take 1 tablet (10 mg) by mouth nightly as needed for sleep - Oral    Qty 30    Last Refill 4/27/23    Medication Refill Pended Per Refill Protocol

## 2023-06-16 NOTE — TELEPHONE ENCOUNTER
Per [place]  zolpidem (AMBIEN) 10 MG tablet 4/27 #30, 2/16 #30    Writer routed to provider for approval.

## 2023-06-20 RX ORDER — ZOLPIDEM TARTRATE 10 MG/1
10 TABLET ORAL
Qty: 30 TABLET | Refills: 0 | Status: SHIPPED | OUTPATIENT
Start: 2023-06-20 | End: 2023-08-02

## 2023-08-02 ENCOUNTER — MYC REFILL (OUTPATIENT)
Dept: PSYCHIATRY | Facility: CLINIC | Age: 38
End: 2023-08-02
Payer: COMMERCIAL

## 2023-08-02 DIAGNOSIS — F51.05 INSOMNIA DUE TO OTHER MENTAL DISORDER: ICD-10-CM

## 2023-08-02 DIAGNOSIS — F33.9 MAJOR DEPRESSIVE DISORDER, RECURRENT EPISODE WITH ANXIOUS DISTRESS (H): ICD-10-CM

## 2023-08-02 DIAGNOSIS — F99 INSOMNIA DUE TO OTHER MENTAL DISORDER: ICD-10-CM

## 2023-08-02 RX ORDER — PROPRANOLOL HYDROCHLORIDE 10 MG/1
10 TABLET ORAL 2 TIMES DAILY
Qty: 60 TABLET | Refills: 1 | Status: CANCELLED | OUTPATIENT
Start: 2023-08-02

## 2023-08-02 NOTE — TELEPHONE ENCOUNTER
Will address in another encounter. Awaiting response back from patient.    Urszula Hurtado on 8/2/2023 at 9:44 AM

## 2023-08-07 RX ORDER — ZOLPIDEM TARTRATE 10 MG/1
10 TABLET ORAL
Qty: 30 TABLET | Refills: 0 | Status: SHIPPED | OUTPATIENT
Start: 2023-08-07 | End: 2023-09-14

## 2023-08-15 ENCOUNTER — VIRTUAL VISIT (OUTPATIENT)
Dept: PSYCHIATRY | Facility: CLINIC | Age: 38
End: 2023-08-15
Attending: PSYCHIATRY & NEUROLOGY
Payer: COMMERCIAL

## 2023-08-15 DIAGNOSIS — F33.9 MAJOR DEPRESSIVE DISORDER, RECURRENT EPISODE WITH ANXIOUS DISTRESS (H): ICD-10-CM

## 2023-08-15 PROCEDURE — 99214 OFFICE O/P EST MOD 30 MIN: CPT | Mod: 95 | Performed by: PSYCHIATRY & NEUROLOGY

## 2023-08-15 RX ORDER — PROCHLORPERAZINE MALEATE 10 MG
10 TABLET ORAL EVERY 6 HOURS PRN
COMMUNITY
Start: 2023-06-14 | End: 2023-11-01

## 2023-08-15 RX ORDER — PROPRANOLOL HYDROCHLORIDE 10 MG/1
10 TABLET ORAL 2 TIMES DAILY
Qty: 60 TABLET | Refills: 1 | Status: SHIPPED | OUTPATIENT
Start: 2023-08-15 | End: 2023-11-17

## 2023-08-15 RX ORDER — SIMETHICONE 125 MG
125 TABLET,CHEWABLE ORAL 4 TIMES DAILY PRN
COMMUNITY
Start: 2023-06-02

## 2023-08-15 RX ORDER — METOCLOPRAMIDE 5 MG/1
5 TABLET ORAL 2 TIMES DAILY PRN
COMMUNITY
Start: 2023-03-03 | End: 2024-04-22

## 2023-08-15 RX ORDER — DOCUSATE SODIUM 250 MG
250 CAPSULE ORAL DAILY PRN
COMMUNITY
Start: 2023-03-02

## 2023-08-15 RX ORDER — DICYCLOMINE HYDROCHLORIDE 10 MG/1
10 CAPSULE ORAL 3 TIMES DAILY PRN
COMMUNITY
Start: 2023-06-15 | End: 2024-04-22

## 2023-08-15 ASSESSMENT — ANXIETY QUESTIONNAIRES
GAD7 TOTAL SCORE: 16
3. WORRYING TOO MUCH ABOUT DIFFERENT THINGS: NEARLY EVERY DAY
6. BECOMING EASILY ANNOYED OR IRRITABLE: SEVERAL DAYS
5. BEING SO RESTLESS THAT IT IS HARD TO SIT STILL: NOT AT ALL
IF YOU CHECKED OFF ANY PROBLEMS ON THIS QUESTIONNAIRE, HOW DIFFICULT HAVE THESE PROBLEMS MADE IT FOR YOU TO DO YOUR WORK, TAKE CARE OF THINGS AT HOME, OR GET ALONG WITH OTHER PEOPLE: EXTREMELY DIFFICULT
4. TROUBLE RELAXING: NEARLY EVERY DAY
7. FEELING AFRAID AS IF SOMETHING AWFUL MIGHT HAPPEN: NEARLY EVERY DAY
GAD7 TOTAL SCORE: 16
2. NOT BEING ABLE TO STOP OR CONTROL WORRYING: NEARLY EVERY DAY
1. FEELING NERVOUS, ANXIOUS, OR ON EDGE: NEARLY EVERY DAY

## 2023-08-15 ASSESSMENT — PATIENT HEALTH QUESTIONNAIRE - PHQ9
SUM OF ALL RESPONSES TO PHQ QUESTIONS 1-9: 22
10. IF YOU CHECKED OFF ANY PROBLEMS, HOW DIFFICULT HAVE THESE PROBLEMS MADE IT FOR YOU TO DO YOUR WORK, TAKE CARE OF THINGS AT HOME, OR GET ALONG WITH OTHER PEOPLE: EXTREMELY DIFFICULT
SUM OF ALL RESPONSES TO PHQ QUESTIONS 1-9: 22

## 2023-08-15 NOTE — PROGRESS NOTES
Virtual Visit Details    Type of service:  Video Visit     Originating Location (pt. Location): Other work    Distant Location (provider location):  On-site  Platform used for Video Visit: Shereen

## 2023-08-15 NOTE — PATIENT INSTRUCTIONS
PLAN                                                                                                       1) Meds    CONTINUE Bupropion (Wellbutrin) XL to 300 mg daily in the morning  CONTINUE Propranolol 10 mg twice daily as needed for anxiety/panic (avoid taking if you feel lightheaded or dehydrated due to ongoing GI upset)  CONTINUE  Ambien 10 mg at bedtime as needed for sleep     2) Other: None today.    3) RTC: In 4 weeks. MTM prior to return visit.     4) Crisis numbers: For crisis resources, please see the information at the end of this document.    Patient Education      Thank you for coming to the [facility] [place] [facility].     Lab Testing:  If you had lab testing today and your results are reassuring or normal they will be mailed to you or sent through [facility] within 7 days. If the lab tests need quick action we will call you with the results. The phone number we will call with results is # [phone]. If this is not the best number please call our clinic and change the number.     Medication Refills:  If you need any refills please call your pharmacy and they will contact us. Our fax number for refills is [phone].   Three business days of notice are needed for general medication refill requests.   Five business days of notice are needed for controlled substance refill requests.   If you need to change to a different pharmacy, please contact the new pharmacy directly. The new pharmacy will help you get your medications transferred.     Contact Us:  Please call [phone] during business hours (8-5:00 M-F).   If you have medication related questions after clinic hours, or on the weekend, please call [phone].     Financial Assistance [phone]   Medical Records [phone]       MENTAL HEALTH CRISIS RESOURCES:  For a emergency help, please call 911 or go to the nearest Emergency Department.     Emergency Walk-In Options:   [facility] Unit @ [place]  [facility] ([place]): [phone] - Specialized mental health emergency area designed to be calming  [facility] ([place]): [phone]  [facility] ([place]): [phone]  [facility] ([place]): [phone]    [facility] Crisis Information:   [place]: [phone]  [place]: [phone]  [place] ([place]) - Adult: [phone]     Child: [phone]  [place] - Adult: [phone]     Child: [phone]  [place]: [phone]  List of all [employer] resources:   https://[place].gov/dhs/people-we-serve/adults/health-care/mental-health/resources/crisis-contacts.jsp    National Crisis Information:   Crisis Text Line: Text  [place]  to 534195  Suicide & Crisis Lifeline: 988  National Suicide Prevention Lifeline: 6-671-873-TALK (1-471.834.2324)       For online chat options, visit https://suicidepreventionlifeline.org/chat/  Poison Control Center: 1-593.183.9532  Trans Lifeline: 1-808.663.5683 - Hotline for transgender people of all ages  The [place] Project: 6-075-897-5264 - Hotline for LGBT youth     For Non-Emergency Support:   Fast Tracker: Mental Health & Substance Use Disorder Resources -   https://www.[employer].org/

## 2023-08-15 NOTE — NURSING NOTE
Is the patient currently in the state of [place]? YES    Visit mode:VIDEO    If the visit is dropped, the patient can be reconnected by: VIDEO VISIT: Text to cell phone: [phone]    Will anyone else be joining the visit? NO      How would you like to obtain your AVS? MyChart    Are changes needed to the allergy or medication list? NO    Reason for visit: RECHECK

## 2023-08-15 NOTE — PROGRESS NOTES
[facility]  Psychiatry Clinic  PSYCHIATRY PROGRESS NOTE     CARE TEAM:  PCP- No Ref-Primary, Physician   GI- at [facility] .  Viktoria is a 38 year old who prefers the name Viktoria and uses pronouns she, her.     DIAGNOSES                                                                                        Major Depressive Disorder with Anxious Distress  Insomnia    ASSESSMENT                                                                                          Viktoria Guillen is a 37 year old female with symptoms supporting a diagnosis of MAJOR DEPRESSIVE DISORDER with anxious distress. While she initially felt some improvement on Wellbutrin  mg, she now reports significant increase in depressed mood since June. She continues to experience anxiety and fatigue as well. Sleep has worsened, but nutrition has improved. Viktoria requests a referral to explore alternative options for managing depression at this time. She is interested in both TMS and ketamine. We also discussed medication changes that might help in the meantime. Reviewed past medication trials. From Viktoria's memory of what worked best with the least amount of side effects, she is most inclined to restart amitriptyline 25 mg + perphenazine. Will start with MTM referral to review past medication trials and potential interactions with current medication regimen, while simultaneously pursing a TMS referral.     MNPMP was checked today:  Indicates taking controlled medication as prescribed.    PLAN                                                                                                       1) Meds    CONTINUE Bupropion (Wellbutrin) XL to 300 mg daily in the morning  CONTINUE Propranolol 10 mg twice daily as needed for anxiety/panic (avoid taking if you feel lightheaded or dehydrated due to ongoing GI upset)  CONTINUE  Ambien 10 mg at bedtime as needed for sleep     2) Other: None today.    3) RTC: In 4 weeks.     4) Crisis  "numbers in AVS.     CHIEF COMPLAINT                                      \" Things have been getting worse these past two months \"     PERTINENT BACKGROUND                                         [initial eval 02/16/23]     Viktoria Guillen is a 37 year old female with a significant history of depressive episodes starting at age 14. She reports many past medication trials, as well as a long history of working with a psychotherapist. She has had several episodes of suicidal ideation over the years, with the most significant occurring in 2015. At that time, she had no attempts or concrete plan, but had daily intrusive SI thoughts. In 2013 she had one episode of self-injury that involved cutting her wrist with a steak knife. She reports many episodes of depression over the past 23 years. She also reports a history of anxiety.     Psych pertinent item history includes SIB     HISTORY OF PRESENT ILLNESS                                                      Since the last visit, Viktoria reports she was hospitalized for four days in early June for dehydration, hypokalemia, and nausea/vomiting. Continues taking Wellbutrin  mg, but notes that her depression has significantly increased. \"The last two months have been awful.\" Endorses depressed mood, difficulty getting out of bed, decreased antonio in life, low energy. Goes to bed around 2100, but taking over an hour to fall asleep even with Ambien. Waking up throughout the night 2-3 times. Sometimes has difficulty falling back to sleep. Having difficulty getting out of bed I the morning. Not feeling well rested and experiencing increase morning nausea. Anxiety is increased, which Viktoria attributes to worry over losing her job due to missing work for health related concerns. Endorses some \"anxiety attacks\", but denies full blown panic attacks. Notes heart pounding, inability to concentrate, racing/panicky thoughts. Notes that her depression and anxiety have continued to worsen since " "her hospitalization in June. Does report her day-to-day physical health has improved some since hospitalization, though she still has a day of debilitating nausea once every 2-3 weeks. Oral intake has improved since starting new nausea medications. Drinking 2 ensure per day and eating small meals. On her feet at work most of the day and has a five minute walk to and from her parking spot each day. Feels quite fatigued at the end of each work day. Having difficulty concentrating on things she would like to do at home.     Recent Symptoms:   Depression:  depressed mood, low energy, insomnia, feeling trapped and overwhelmed-  Anxiety:  excessive worry and nervous/overwhelmed  Panic Attack:  peaks in < several  mins, occurs 2x per week, triggers are known, palpitations, diaphoresis and tremors - none since last visit, see HPI for details     Adverse Med Effects:  See table below  Pertinent Negatives:  No   Recent Substance Use:    None reported    SOCIAL and FAMILY HISTORY                                                 per pt report         Family Hx:  Not obtained at this visit.     Social Hx:  Social Support - good social support from girlfriend \"great relationship\"    PAST PSYCH and SUBSTANCE USE HISTORY                      Psych:  Suicidal ideation - Past, none currently   SIB - One prior episode 2013, none currently      Substance Use:  No additional substance use history.    MEDICAL HISTORY     There is no problem list on file for this patient.    Has been physically ill since October. Unintentional 20 pound weight loss since October. GI specialist through [facility].     [place] Provider: Alexandra (not PCP).    ALLERGIES: Patient has no known allergies.     MEDICAL REVIEW OF SYSTEMS                                                                  none in addition to that documented above    CURRENT MEDS       Current Outpatient Medications   Medication Sig Dispense Refill    buPROPion (WELLBUTRIN XL) 150 MG " 24 hr tablet Take 1 tablet (150 mg) by mouth every morning 30 tablet 0    buPROPion (WELLBUTRIN XL) 300 MG 24 hr tablet Take 1 tablet (300 mg) by mouth every morning 30 tablet 11    ondansetron (ZOFRAN ODT) 4 MG ODT tab Take 4 mg by mouth every 6 hours as needed for nausea      pantoprazole (PROTONIX) 40 MG EC tablet Take 40 mg by mouth daily      propranolol (INDERAL) 10 MG tablet Take 1 tablet (10 mg) by mouth 2 times daily for 30 days 60 tablet 1    PROPRANOLOL HCL PO Take 10 mg by mouth as needed for high blood pressure (Patient not taking: Reported on 5/16/2023)      zolpidem (AMBIEN) 10 MG tablet Take 1 tablet (10 mg) by mouth nightly as needed for sleep 30 tablet 0    Zolpidem Tartrate (AMBIEN PO) Take 10 mg by mouth nightly as needed for sleep (Patient not taking: Reported on 5/16/2023)          Psychotropic Medication Trials      Medication Max Dose (mg) Dates / Duration Helpful? DC Reason / Adverse Effects?   Zoloft (sertraline) Unknown Unknown No None   Paxil (paroxetine) Unknown Unknown No Cause SI at the age of 14   Celexa (citalopram) Unknown Unknown No Full body rash/hives in patient's 20s    Lexapro (escitalopram) Unknown Unknown No None   Wellbutrin  (bupropion) 100mg IR Unknown Yes None   Remeron (mirtazapine) Unknown Unknown Yes Weight gain of 30 pounds in one month   Viibryd (vilazodone) Unknown Unknown Yes Reported long withdrawal    Elavil (amitriptyline) 25-50mg Unknown Yes Somewhat helpful at 25mg; described a strange sensation of mental fog/slowing, but not sedation at 50mg   Abilify (aripriprazole) Unknown Unknown No Reports sedation     Neurontin (gabapentin) 300mg Unknown Unsure Unsure   Inderal (propranolol) 10mg Unknown Yes None   Ambien (zolpidem) 10mg Unknown Yes None   Melatonin Unknown Unknown No None   Xanax (alprazolam) Unknown Unknown Yes None   Vistaril / Atarax (hydroxyzine) 50mg Unknown No Used for sleep, cause some drowsiness, but not enough for sleep onset       VITALS                                                                                               There were no vitals taken for this visit.   MENTAL STATUS EXAM                                                             Alertness: alert   Appearance: adequately groomed  Behavior/Demeanor: cooperative, with good  eye contact   Speech: regular rate and rhythm  Language: intact  Psychomotor: normal or unremarkable  Mood: depressed  Affect: flat; congruent to: mood- yes, content- yes  Thought Process/Associations: unremarkable  Thought Content:  Reports none;  Denies suicidal & violent ideation and delusions  Perception:  Reports none;  Denies hallucinations  Insight: adequate  Judgment: appropriate  Cognition: does  appear grossly intact; formal cognitive testing was not done  Gait and Station: N/A (telehealth)    LABS and DATA         2/16/2023    12:58 PM 5/16/2023     7:42 AM 8/15/2023     8:34 AM   PHQ   PHQ-9 Total Score 17 15 22   Q9: Thoughts of better off dead/self-harm past 2 weeks Not at all Not at all Several days   F/U: Thoughts of suicide or self-harm   No   F/U: Safety concerns   No       PSYCHOTROPIC DRUG INTERACTIONS   none     MANAGEMENT:  N/A    RISK STATEMENT for SAFETY   Viktoria did not appear to be an imminent safety risk to self or others.    TREATMENT RISK STATEMENT:  The risks, benefits, alternatives and potential adverse effects have been discussed and are understood by the pt. The pt understands the risks of using street drugs or alcohol. There are no medical contraindications, the pt agrees to treatment with the ability to do so. The pt knows to call the clinic for any problems or to access emergency care if needed.  Medical and substance use concerns are documented above.  Psychotropic drug interaction check was done, including changes made today.    PROVIDER:  BENITO Cho CNP       MEDICAL DECISION MAKING        ([facility])     Level of Medical Decision Making:   - At least 1  chronic problem that is not stable  - Engaged in prescription drug management during visit (discussed any medication benefits, side effects, alternatives, etc.)  Number of unique external sources from which notes were reviewed: 1 - CareEverywhere information from [facility]  reviewed      Answers submitted by the patient for this visit:  Patient Health Questionnaire (Submitted on 8/15/2023)  If you checked off any problems, how difficult have these problems made it for you to do your work, take care of things at home, or get along with other people?: Extremely difficult  PHQ9 TOTAL SCORE: 22  LUBNA-7 (Submitted on 8/15/2023)  LUBNA 7 TOTAL SCORE: 16

## 2023-08-17 ENCOUNTER — MYC MEDICAL ADVICE (OUTPATIENT)
Dept: PSYCHIATRY | Facility: CLINIC | Age: 38
End: 2023-08-17
Payer: COMMERCIAL

## 2023-09-11 ENCOUNTER — MYC MEDICAL ADVICE (OUTPATIENT)
Dept: PSYCHIATRY | Facility: CLINIC | Age: 38
End: 2023-09-11
Payer: COMMERCIAL

## 2023-09-11 NOTE — TELEPHONE ENCOUNTER
Writer spoke with Viktoria via phone for check in. She reports worsening anxiety and intrusive thoughts over the past week. Reports anxiety about driving is becoming 'obsessive,' and she feels hypervigilant and defensive whenever she drives. She hasn't experienced this in the past.  Endorses intrusive thoughts about self harm, such as turning her engine on in closed garage. Denies having thoughts about dying or suicide, and denies active SI/SIB. Viktoria reports she would never act on these thoughts or harm herself. Reports protective factors and support system of her partner and dog.   We reviewed a safety plan of telling her partner or calling clinic if these thoughts become worse. We reviewed that she would call 911 or go to the ER if she experiences active SI. Reviewed [facility] and [facility] as options. Viktoria reports that she feels safe at home right now, but would follow safety plan if things worsen.  She has an MTM appointment on 9/13 and visit with Marixa Kay on 9/15/23; Viktoria confirmed she will be attending both visits. She agreed to contact clinic with any concerns or questions prior to visit.

## 2023-09-12 ENCOUNTER — HOSPITAL ENCOUNTER (OUTPATIENT)
Facility: CLINIC | Age: 38
Setting detail: OBSERVATION
Discharge: HOME | End: 2023-09-13
Attending: EMERGENCY MEDICINE | Admitting: EMERGENCY MEDICINE
Payer: COMMERCIAL

## 2023-09-12 DIAGNOSIS — F51.01 PRIMARY INSOMNIA: ICD-10-CM

## 2023-09-12 DIAGNOSIS — F33.2 MDD (MAJOR DEPRESSIVE DISORDER), RECURRENT SEVERE, WITHOUT PSYCHOSIS (H): ICD-10-CM

## 2023-09-12 DIAGNOSIS — R45.851 SUICIDAL IDEATION: ICD-10-CM

## 2023-09-12 DIAGNOSIS — F41.9 ANXIETY: ICD-10-CM

## 2023-09-12 PROBLEM — K58.9 SPASTIC COLON: Status: ACTIVE | Noted: 2020-04-01

## 2023-09-12 PROBLEM — R10.13 DYSPEPSIA: Status: ACTIVE | Noted: 2023-01-06

## 2023-09-12 PROBLEM — F98.8 ADD (ATTENTION DEFICIT DISORDER): Status: ACTIVE | Noted: 2020-04-01

## 2023-09-12 PROBLEM — K21.9 GERD (GASTROESOPHAGEAL REFLUX DISEASE): Status: ACTIVE | Noted: 2020-04-01

## 2023-09-12 PROBLEM — K59.04 CHRONIC IDIOPATHIC CONSTIPATION: Status: ACTIVE | Noted: 2023-08-08

## 2023-09-12 PROBLEM — F32.A DEPRESSION, UNSPECIFIED DEPRESSION TYPE: Status: ACTIVE | Noted: 2023-09-12

## 2023-09-12 PROBLEM — K31.84 GASTROPARESIS: Status: ACTIVE | Noted: 2023-09-12

## 2023-09-12 PROBLEM — F40.10 SOCIAL ANXIETY DISORDER: Status: ACTIVE | Noted: 2020-04-01

## 2023-09-12 PROCEDURE — 250N000013 HC RX MED GY IP 250 OP 250 PS 637: Performed by: STUDENT IN AN ORGANIZED HEALTH CARE EDUCATION/TRAINING PROGRAM

## 2023-09-12 PROCEDURE — 99285 EMERGENCY DEPT VISIT HI MDM: CPT | Mod: 25

## 2023-09-12 PROCEDURE — 90791 PSYCH DIAGNOSTIC EVALUATION: CPT

## 2023-09-12 PROCEDURE — 250N000013 HC RX MED GY IP 250 OP 250 PS 637

## 2023-09-12 PROCEDURE — G0378 HOSPITAL OBSERVATION PER HR: HCPCS

## 2023-09-12 RX ORDER — TRAZODONE HYDROCHLORIDE 50 MG/1
50 TABLET, FILM COATED ORAL
Status: DISCONTINUED | OUTPATIENT
Start: 2023-09-12 | End: 2023-09-13

## 2023-09-12 RX ORDER — ACETAMINOPHEN 325 MG/1
650 TABLET ORAL EVERY 4 HOURS PRN
Status: DISCONTINUED | OUTPATIENT
Start: 2023-09-12 | End: 2023-09-13 | Stop reason: HOSPADM

## 2023-09-12 RX ORDER — LANOLIN ALCOHOL/MO/W.PET/CERES
3 CREAM (GRAM) TOPICAL
Status: DISCONTINUED | OUTPATIENT
Start: 2023-09-12 | End: 2023-09-13 | Stop reason: HOSPADM

## 2023-09-12 RX ORDER — OLANZAPINE 10 MG/1
10 TABLET, ORALLY DISINTEGRATING ORAL 2 TIMES DAILY PRN
Status: DISCONTINUED | OUTPATIENT
Start: 2023-09-12 | End: 2023-09-13 | Stop reason: HOSPADM

## 2023-09-12 RX ORDER — ONDANSETRON 4 MG/1
4 TABLET, ORALLY DISINTEGRATING ORAL EVERY 6 HOURS PRN
Status: DISCONTINUED | OUTPATIENT
Start: 2023-09-12 | End: 2023-09-13 | Stop reason: HOSPADM

## 2023-09-12 RX ORDER — OLANZAPINE 10 MG/2ML
10 INJECTION, POWDER, FOR SOLUTION INTRAMUSCULAR 2 TIMES DAILY PRN
Status: DISCONTINUED | OUTPATIENT
Start: 2023-09-12 | End: 2023-09-13 | Stop reason: HOSPADM

## 2023-09-12 RX ORDER — BUPROPION HYDROCHLORIDE 300 MG/1
300 TABLET ORAL EVERY MORNING
Status: DISCONTINUED | OUTPATIENT
Start: 2023-09-13 | End: 2023-09-13 | Stop reason: HOSPADM

## 2023-09-12 RX ORDER — DOCUSATE SODIUM 250 MG
250 CAPSULE ORAL AT BEDTIME
Status: DISCONTINUED | OUTPATIENT
Start: 2023-09-12 | End: 2023-09-12

## 2023-09-12 RX ORDER — SIMETHICONE 125 MG
125 TABLET,CHEWABLE ORAL 4 TIMES DAILY PRN
Status: DISCONTINUED | OUTPATIENT
Start: 2023-09-12 | End: 2023-09-13 | Stop reason: HOSPADM

## 2023-09-12 RX ORDER — PROPRANOLOL HYDROCHLORIDE 10 MG/1
10 TABLET ORAL 2 TIMES DAILY
Status: DISCONTINUED | OUTPATIENT
Start: 2023-09-12 | End: 2023-09-13 | Stop reason: HOSPADM

## 2023-09-12 RX ORDER — HYDROXYZINE HYDROCHLORIDE 50 MG/1
50 TABLET, FILM COATED ORAL EVERY 6 HOURS PRN
Status: DISCONTINUED | OUTPATIENT
Start: 2023-09-12 | End: 2023-09-13 | Stop reason: HOSPADM

## 2023-09-12 RX ORDER — METOCLOPRAMIDE 5 MG/1
5 TABLET ORAL 2 TIMES DAILY PRN
Status: DISCONTINUED | OUTPATIENT
Start: 2023-09-12 | End: 2023-09-13 | Stop reason: HOSPADM

## 2023-09-12 RX ORDER — PROCHLORPERAZINE MALEATE 10 MG
10 TABLET ORAL EVERY 6 HOURS PRN
Status: DISCONTINUED | OUTPATIENT
Start: 2023-09-12 | End: 2023-09-13 | Stop reason: HOSPADM

## 2023-09-12 RX ORDER — IBUPROFEN 600 MG/1
600 TABLET, FILM COATED ORAL EVERY 6 HOURS PRN
Status: DISCONTINUED | OUTPATIENT
Start: 2023-09-12 | End: 2023-09-13 | Stop reason: HOSPADM

## 2023-09-12 RX ORDER — ZOLPIDEM TARTRATE 5 MG/1
10 TABLET ORAL
Status: DISCONTINUED | OUTPATIENT
Start: 2023-09-12 | End: 2023-09-13 | Stop reason: HOSPADM

## 2023-09-12 RX ORDER — DICYCLOMINE HYDROCHLORIDE 10 MG/1
10 CAPSULE ORAL 3 TIMES DAILY PRN
Status: DISCONTINUED | OUTPATIENT
Start: 2023-09-12 | End: 2023-09-13 | Stop reason: HOSPADM

## 2023-09-12 RX ORDER — PANTOPRAZOLE SODIUM 40 MG/1
40 TABLET, DELAYED RELEASE ORAL DAILY
Status: DISCONTINUED | OUTPATIENT
Start: 2023-09-13 | End: 2023-09-13 | Stop reason: HOSPADM

## 2023-09-12 RX ADMIN — PROPRANOLOL HYDROCHLORIDE 10 MG: 10 TABLET ORAL at 22:16

## 2023-09-12 RX ADMIN — DOCUSATE SODIUM 250 MG: 50 CAPSULE, LIQUID FILLED ORAL at 22:16

## 2023-09-12 RX ADMIN — ZOLPIDEM TARTRATE 10 MG: 5 TABLET ORAL at 22:16

## 2023-09-12 ASSESSMENT — ACTIVITIES OF DAILY LIVING (ADL)
ADLS_ACUITY_SCORE: 35
ADLS_ACUITY_SCORE: 35

## 2023-09-12 NOTE — ED NOTES
[facility]  ED to EMPATH Checklist:      Goal for EMPATH: Depression management and Suicidality    Current Behavior: Calm and Cooperative    Safety Concerns: Suicidal, with a plan to drive car into garage and let it run    Legal Hold Status: Voluntary    Medically Cleared by ED provider: Yes    Patient Therapeutically Searched: Not searched - Currently in triage    Belongings: Remain with patient    Independent Ambulation at Baseline: Yes/No: Yes    Participates in Care/Conversation: Yes/No: Yes    Patient Informed about EMPATH: Yes/No: Yes    DEC: Ordered and pending    Patient Ready to be Transferred to EMPATH? Yes/No: Yes

## 2023-09-12 NOTE — ED PROVIDER NOTES
History     Chief Complaint:  Mental Health Problem       HPI   Viktoria Guillen is a 38 year old female with a history of major depression and anxious distress insomnia disorder comes and for worsening suicidal ideation.  She said she would think about driving her car into the garage and letting it run with the door closed.  She has had a lot of depression and anxiety has been increased recently.  She feels like she is not functioning well and has been missing work.  Sleep has been very poor appetite is down.  She has chronic GI problems so does not eat a lot anyway and takes medication for that.  She was scheduled to see her psychiatrist Friday but did not feel she could make it that long and feels she needs some help.  She is willing to go to [facility].  She has had no illness recently, no chemical use or alcohol use.      Independent Historian:   None - Patient Only    Review of External Notes:   Reviewed the virtual visit with psychiatry from 8/15/2023      Medications:    buPROPion (WELLBUTRIN XL) 300 MG 24 hr tablet  dicyclomine (BENTYL) 10 MG capsule  docusate sodium (DSS) 250 MG capsule  metoclopramide (REGLAN) 5 MG tablet  ondansetron (ZOFRAN ODT) 4 MG ODT tab  pantoprazole (PROTONIX) 40 MG EC tablet  prochlorperazine (COMPAZINE) 10 MG tablet  propranolol (INDERAL) 10 MG tablet  PROPRANOLOL HCL PO  simethicone (MYLICON) 125 MG chewable tablet  zolpidem (AMBIEN) 10 MG tablet  Zolpidem Tartrate (AMBIEN PO)        Past Medical History:    Depression with anxiety   Chronic idiopathic constipation   ADD (attention deficit disorder)   Dyspepsia   Essential hypertension   Gastroparesis   GERD (gastroesophageal reflux disease)   Insomnia   Sleep disorder   Social anxiety disorder   Spastic colon       Past Surgical History:    No past surgical history on file.     Physical Exam   Patient Vitals for the past 24 hrs:   BP Temp Temp src Pulse Resp SpO2   09/12/23 1820 117/75 98.2  F (36.8  C) Temporal 92 18 98 %         Physical Exam  Nursing note and vitals reviewed.  Constitutional:  Appears comfortable.   HENT:    Mucus membranes are moist.  Eyes:    Conjunctivae are normal. No jaundice.  Pupils equal  Cardiovascular:  Normal rate, regular rhythm.      Normal heart sounds.     No murmur heard.  Pulmonary/Chest:  Breath sounds clear. No respiratory distress.     No stridor.   Musculoskeletal: No edema.  Moves all extremities equally.  Neurological:   Alert and appropriate. No focal weakness.  Gait is normal.  Skin:    Skin is warm and dry. No rash noted. No diaphoresis.   Psychiatric:   Somewhat flat affect, seems depressed.  Makes good eye contact and thought processes seem clear and not delusional.      Emergency Department Course       Emergency Department Course & Assessments:    PSS-3      Date and Time Over the past 2 weeks have you felt down, depressed, or hopeless? Over the past 2 weeks have you had thoughts of killing yourself? Have you ever attempted to kill yourself? When did this last happen? User   09/12/23 1821 yes yes no --           C-SSRS ([place])      Date and Time Q1 Wished to be Dead (Past Month) Q2 Suicidal Thoughts (Past Month) Q3 Suicidal Thought Method Q4 Suicidal Intent without Specific Plan Q5 Suicide Intent with Specific Plan Q6 Suicide Behavior (Lifetime) Within the Past 3 Months? RETIRED: Level of Risk per Screen Screening Not Complete User   09/12/23 1821 yes yes yes yes yes no -- -- --               Suicide assessment completed by mental health (D.E.C., LCSW, etc.)    Interventions:  Medications - No data to display     Assessments:  1820    Independent Interpretation (X-rays, CTs, rhythm strip):  None    Consultations/Discussion of Management or Tests:  None        Social Determinants of Health affecting care:   None    Disposition:  The patient was transferred to [facility].     Impression & Plan      Medical Decision Making:  Patient comes in with a known diagnosis of major depression with  now suicidal ideation and worsening anxiety.  She did not feel she could wait for her appointment on Friday.  Physically she has not had any illness recently.  She has been taking her medications regularly.  The patient is medically cleared to go to [facility].  She will need to continue her current medications.  She will be transferred to [facility].      Diagnosis:    ICD-10-CM    1. Suicidal ideation  R45.851       2. Depression, unspecified depression type  F32.A            Discharge Medications:  New Prescriptions    No medications on file          Morelia Madden MD  9/12/2023   Morelia Madden MD Powell, Tracy Alan, MD  09/12/23 2495

## 2023-09-12 NOTE — ED TRIAGE NOTES
Pt reports hx of anxiety and depression. Pt reports suicidal thoughts with plan to put car in garage and let it run since Friday. Stopped taking her 2 antidepressants since Sunday due to thinking the SI was a medication side effect. Calm, cooperative.     Triage Assessment       Row Name 09/12/23 5241       Triage Assessment (Adult)    Airway WDL WDL       Respiratory WDL    Respiratory WDL WDL       Skin Circulation/Temperature WDL    Skin Circulation/Temperature WDL WDL       Cardiac WDL    Cardiac WDL WDL       Peripheral/Neurovascular WDL    Peripheral Neurovascular WDL WDL       Cognitive/Neuro/Behavioral WDL    Cognitive/Neuro/Behavioral WDL WDL

## 2023-09-13 VITALS
WEIGHT: 165.5 LBS | HEIGHT: 65 IN | RESPIRATION RATE: 16 BRPM | SYSTOLIC BLOOD PRESSURE: 101 MMHG | DIASTOLIC BLOOD PRESSURE: 65 MMHG | HEART RATE: 59 BPM | OXYGEN SATURATION: 98 % | BODY MASS INDEX: 27.57 KG/M2 | TEMPERATURE: 98 F

## 2023-09-13 PROCEDURE — 250N000011 HC RX IP 250 OP 636: Performed by: STUDENT IN AN ORGANIZED HEALTH CARE EDUCATION/TRAINING PROGRAM

## 2023-09-13 PROCEDURE — 250N000013 HC RX MED GY IP 250 OP 250 PS 637: Performed by: STUDENT IN AN ORGANIZED HEALTH CARE EDUCATION/TRAINING PROGRAM

## 2023-09-13 PROCEDURE — 99235 HOSP IP/OBS SAME DATE MOD 70: CPT | Performed by: PSYCHIATRY & NEUROLOGY

## 2023-09-13 PROCEDURE — G0378 HOSPITAL OBSERVATION PER HR: HCPCS

## 2023-09-13 RX ORDER — AMITRIPTYLINE HYDROCHLORIDE 50 MG/1
50 TABLET ORAL AT BEDTIME
Status: DISCONTINUED | OUTPATIENT
Start: 2023-09-13 | End: 2023-09-13 | Stop reason: HOSPADM

## 2023-09-13 RX ORDER — AMITRIPTYLINE HYDROCHLORIDE 50 MG/1
50 TABLET ORAL AT BEDTIME
Qty: 15 TABLET | Refills: 1 | Status: SHIPPED | OUTPATIENT
Start: 2023-09-13 | End: 2023-09-15

## 2023-09-13 RX ADMIN — BUPROPION HYDROCHLORIDE 300 MG: 300 TABLET, EXTENDED RELEASE ORAL at 08:40

## 2023-09-13 RX ADMIN — ONDANSETRON 4 MG: 4 TABLET, ORALLY DISINTEGRATING ORAL at 08:12

## 2023-09-13 RX ADMIN — PANTOPRAZOLE SODIUM 40 MG: 40 TABLET, DELAYED RELEASE ORAL at 08:40

## 2023-09-13 ASSESSMENT — ACTIVITIES OF DAILY LIVING (ADL)
ADLS_ACUITY_SCORE: 35

## 2023-09-13 NOTE — PROGRESS NOTES
Discharge instructions reviewed with patient including follow-up care plan. Educated on medication regimen and advised not to stop prescribed medication without consulting their physician. Reviewed safety plan and outpatient resources. Denies SI. All belongings which were brought into the hospital have been returned to patient. Escorted off the unit at  3:05 accompanied by nursing.  Discharged to home in stable condition with significant other via personal vehicle.

## 2023-09-13 NOTE — PROGRESS NOTES
[facility] Progress Note    Client Name: Viktoria Guillen  Date: September 13, 2023  Service Type:  Group Therapy  Facilitator: LETY Lowe        Response:  Patient did not participate in group.      LETY Lowe

## 2023-09-13 NOTE — ED NOTES
Pt brought over to [facility] with increased depression and SI. Pt's she has had a long hx of depression and has had some thoughts of SI but not as sever as recently. Pt endorses a chronic problem with her GI tract and has had trouble with nausea and other issues which she states has exacerbated her depression dn cause more suicidal thinking. Pt states she has had more thoughts of CO poisoning herself in the garage with her car which she feels is a change in her symptoms. Pt stopped taking her Wellbutrin and Reglan as a result because she was worried that these medications could be causing increased symptoms SI. Pt states he has never had active thoughts to hurt herself which has made her think she needs further mental health help. Pt plans to see her psychiatrist on Friday however felt that these symptoms are too intense and is wanting to meet with our LMHP and psychiatrist to discuss a plan.Pt does use marihuana frequently to help with her nausea and was using it more recently to help with her SI. Pt sate that when she is high she feels less depressed and with less SI. Nursing and risk assessments completed.  Assessments reviewed with LMHP and physician. Video monitoring in progress, patient informed.  Admission information reviewed with patient. Patient given a tour of [facility] and instructions on using the facility. Questions regarding [facility] addressed. Pt search completed and belongings inventoried.

## 2023-09-13 NOTE — DISCHARGE INSTRUCTIONS
Type: Teletherapy  Date: Tuesday, 9/19/2023  Time: 4:00 pm - 4:55 pm  Provider: Leeanna Guerra MA  [facility]  Location: [facility], [address] [place] [address]  Phone: [phone]  Patient Instructions: Telehealth/Virtual services only. No in-person services. New patients are contacted via phone/email by CDS office managers (to confirm information), before 1st appointment. Electronic device w/video capabilities required for services. New patients have e-paperwork to complete prior to 1st appointment (access to e-docs given by CDS  during intake). Call CDS w/questions [phone] or email [email or web address]    Continue working with your established outpatient psychiatry provider, Dr. Kay, with a reported appointment on Friday, September 15.     Gene Sight testing - [email or web address]  [facility] [employer] [place] - [email or web address]  looks like they do GeneSight testing however not sure if you need to be an active client of their's        Aftercare Plan  If I am feeling unsafe or I am in a crisis, I will:   Contact my established care providers   Call the National Suicide Prevention Lifeline: 988  Go to the nearest emergency room   Call 911     Warning signs that I or other people might notice when a crisis is developing for me: not getting out of bed during the day, worsening depression, sadness, loss of interest    Things I am able to do on my own to cope or help me feel better: spend time with my pets     Things that I am able to do with others to cope or help me better: go for a walk     Things I can use or do for distraction: music     Changes I can make to support my mental health and wellness: tell people when I am struggling     People in my life that I can ask for help: Shania, mother     Your Critical access hospital has a mental health crisis team you can call 24/7: [facility] Mobile Crisis  [phone] (adults)  [phone]  "(children)    Other things that are important when I'm in crisis: nothing     Crisis Lines  Crisis Text Line  Text 545407  You will be connected with a trained live crisis counselor to provide support.  Mk contreras, soilao  GURMEET a 599163 o texto a 442-AYUDAME en WhatsApp  The [place] [employer] (LGBTQ Youth Crisis Line)  8.806.054.6503  text START to 516-677    C-nario  Fast Tracker  Linking people to mental health and substance use disorder resources  800APP   [place] Mental Health Warm Line  Peer to peer support  Monday thru Saturday, 12 pm to 10 pm  795.418.4929 or 4.642.720.0265  Text \"Support\" to 46945  National [place] on Mental Illness (NILTON)  529.743.9458 or 1.888.NILTON.HELPS    Mental Health Apps  My3  https://InflowControlpp.org/  VirtualHopeBox  https://BDS.com.au/apps/virtual-hope-box/    Additional Information  Today you were seen by a licensed mental health professional through Triage and Transition services, [facility] ([facility])  for a crisis assessment in the Emergency Department at [facility].  It is recommended that you follow up with your established providers (psychiatrist, mental health therapist, and/or primary care doctor - as relevant) as soon as possible. Coordinators from [facility] will be calling you in the next 24-48 hours to ensure that you have the resources you need.  You can also contact [facility] coordinators directly at [phone]. You may have been scheduled for or offered an appointment with a mental health provider. [facility] maintains an extensive network of licensed behavioral health providers to connect patients with the services they need.  We do not charge providers a fee to participate in our referral network.  We match patients with providers based on a patient's specific needs, insurance coverage, and location.  Our first effort will be to refer you to a provider within your care system, and will utilize providers outside your care " system as needed.

## 2023-09-13 NOTE — ED NOTES
Pt slept through the night uneventfully. No signs of distress noted. Respirations were even and unlabored. Will continue to monitor.

## 2023-09-13 NOTE — ED PROVIDER NOTES
EmPATH Unit - Psychiatry  Combined Observation Note and Discharge Summary  [facility] Emergency Department  Observation Initiation Date: Sep 12, 2023    Viktoria Guillen MRN: [id number]   Age: 38 year old YOB: 1985     History     Chief Complaint   Patient presents with    Mental Health Problem     HPI  Viktoria Guillen is a 38 year old female with a past history notable for major depressive disorder and anxiety who presents to the emergency department for evaluation of worsening depressed mood and suicidal thoughts.  In the emergency department, she was determined to be medically stable and transferred to the EmPATH unit for psychiatric assessment.  She is now nearing 18 hours in the emergency department.  Overnight, there were no acute issues.  On examination, the patient reports a long history of depression and anxiety since a young adult.  She recalls responding well to amitriptyline in college, eventually gaining remission of symptoms, and coming off of the medication.  Throughout her adult life, she has had multiple bouts of depression, interpreting inability to gain remission of symptoms since college.  This is led to numerous medication trials, describing a complicated response to numerous SSRIs, Cymbalta, mirtazapine, augmentation with Abilify, leading to her current medication trial of Wellbutrin.  She notes that she has tolerated this medication without side effects however is not certain how beneficial it has been.  Due to an accumulation of psychosocial stressors, her depressive symptoms recently worsened and led to thoughts of suicide.  Today, symptom intensity has subsided although she continues to endorse moderate to severe depressive symptoms at baseline.  She is no longer experiencing suicidal thoughts.  She notes that her outpatient team has been helping her gain a referral to ketamine treatment and is currently awaiting prior authorization from her insurance plan.  She felt that  "symptom intensity was too great to continue waiting for that treatment to occur and came to the emergency department for help.  She adds that there has been some consideration to restart amitriptyline, a medication that she previously responded well to in college, in hopes of regaining a similar response.  She is open to that option today.  There is no indication of psychosis or homicidal thoughts.  She anticipates discharge home later today.      Past Medical History  No past medical history on file.  No past surgical history on file.  amitriptyline (ELAVIL) 50 MG tablet  buPROPion (WELLBUTRIN XL) 300 MG 24 hr tablet  dicyclomine (BENTYL) 10 MG capsule  docusate sodium (DSS) 250 MG capsule  metoclopramide (REGLAN) 5 MG tablet  ondansetron (ZOFRAN ODT) 4 MG ODT tab  pantoprazole (PROTONIX) 40 MG EC tablet  prochlorperazine (COMPAZINE) 10 MG tablet  propranolol (INDERAL) 10 MG tablet  PROPRANOLOL HCL PO  simethicone (MYLICON) 125 MG chewable tablet  zolpidem (AMBIEN) 10 MG tablet      Allergies   Allergen Reactions    Razia Angioedema and Swelling    Citalopram Rash and Swelling     Other Reaction(s): Angioedema     Family History  No family history on file.  Social History   Social History     Tobacco Use    Smoking status: Never    Smokeless tobacco: Never          Review of Systems  A medically appropriate review of systems was performed with pertinent positives and negatives noted in the HPI, and all other systems negative.    Physical Examination   BP: 117/75  Pulse: 92  Temp: 98.2  F (36.8  C)  Resp: 18  Height: 165.1 cm (5' 5\")  Weight: 75.1 kg (165 lb 8 oz)  SpO2: 98 %    Physical Exam  General: Appears stated age.   Neuro: Alert and fully oriented. Extremities appear to demonstrate normal strength on visual inspection.   Integumentary/Skin: no rash visualized, normal color    Psychiatric Examination   Appearance: awake, alert  Attitude:  cooperative  Eye Contact:  fair  Mood:  anxious and depressed  Affect: "  intensity is blunted  Speech:  clear, coherent  Psychomotor Behavior:  no evidence of tardive dyskinesia, dystonia, or tics  Thought Process:  logical and linear  Associations:  no loose associations  Thought Content:  no evidence of suicidal ideation or homicidal ideation and no evidence of psychotic thought  Insight:  fair  Judgement:  fair  Oriented to:  time, person, and place  Attention Span and Concentration:  fair  Recent and Remote Memory:  fair  Language: able to name/identify objects without impairment  Fund of Knowledge: intact with awareness of current and past events    ED Course        Labs Ordered and Resulted from Time of ED Arrival to Time of ED Departure - No data to display    Assessments & Plan (with Medical Decision Making)   Patient presenting with worsening symptoms of her depressive disorder, heightened anxiety, and suicidal thoughts.  During the course of her visit in the emergency department, symptom intensity has subsided and she is no longer endorsing suicidal thoughts.  Her treatment plan focused on optimizing antidepressant interventions.  Nursing notes reviewed noting no acute issues.     I have reviewed the assessment completed by the LM.     During the observation period, the patient did not require medications for agitation, and did not require restraints/seclusion for patient and/or provider safety.    The patient was found to have a psychiatric condition that would benefit from an observation stay in the emergency department for further psychiatric stabilization and/or coordination of a safe disposition. The observation plan includes serial assessments of psychiatric condition, potential administration of medications if indicated, further disposition pending the patient's psychiatric course during the monitoring period.     Preliminary diagnosis:    ICD-10-CM    1. Suicidal ideation  R45.851       2. MDD (major depressive disorder), recurrent severe, without psychosis (H)   F33.2       3. Anxiety  F41.9       4. Primary insomnia  F51.01            Treatment Plan:  -Continue Wellbutrin  mg daily for antidepressant treatment.  We considered optimizing the dose however she notes emerging insomnia since starting Wellbutrin and there is some concern regarding worsening her current state of anxiety at the higher dose.  Noting that she is likely gaining maximum therapeutic benefit at this dose, we discussed augmentation strategies.  -We discussed augmenting with medications such as Rexulti or Vraylar.  Noting that she has responded well to amitriptyline several years back, we decided to proceed with another trial of amitriptyline for antidepressant treatment.  The medication will be initiated at 50 mg nightly.  The dose may gradually be optimized depending on her response and tolerability in the upcoming weeks.  -Noting a complicated response to numerous psychotropic medications, we discussed the benefit of obtaining GeneSight testing to help rule out whether metabolism abnormalities have been complicating her response to medications.  She will further discuss this test with her outpatient providers at their next visit.  -She is awaiting preauthorization to begin ketamine treatments for her depressive disorder.  She was also educated on TMS as an alternative if barriers are encountered or if her response is not sufficient.  -Resume outpatient medication management appointments and individual psychotherapy  -Discharge home today.    After the period in observation care, the patient's circumstances and mental state were safe for outpatient management. After counseling on the diagnosis, work-up, and treatment plan, the patient was discharged. Close follow-up with a psychiatrist and/or therapist was recommended and community psychiatric resources were provided. Patient is to return to the ED if any urgent or potentially life-threatening concerns.      At the time of discharge, the patient's  acute suicide risk was determined to be low due to the following factors: Reduction in the intensity of mood/anxiety symptoms that preceded the admission, denial of suicidal thoughts, denies feeling helpless or helpless, not currently under the influence of alcohol or illicit substances, denies experiencing command hallucinations, no immediate access to firearms. The patient's acute risk could be higher if noncompliant with their treatment plan, medications, follow-up appointments or using illicit substances or alcohol. Protective factors include: social supports, stable housing, employment    --  Javier Mendoza MD   [facility] EMERGENCY DEPT  EmPATH Unit        Javier Mendoza MD  09/13/23 [id number]

## 2023-09-13 NOTE — PROGRESS NOTES
[facility] Progress Note    Client Name: Viktoria Guillen  Date: September 12, 2023  Service Type:  Group Therapy  Facilitator:[email or web address]        Response:  Patient did not participate in group.      Bandar Moulton
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
## 2023-09-13 NOTE — PROGRESS NOTES
"Triage & Transition Services [facility]     Progress Note    Patient: Viktoria goes by \"Viktoria,\" uses she/her pronouns  Date of Service: September 13, 2023  Site of Service: Em[facility]  Patient was seen in-person.     Presenting problem:  Please see initial [facility] Crisis Assessment completed by Bandar Moulton on 09/12/2023 for complete assessment information. Notable concerns include Depression, Suicidal ideation, Health stressors.     Individuals Present: Viktoria & Oliver Morris, [facility]    Session start: 0950  Session end: 1006  Session duration in minutes: 16  CPT utilized: 25159 - Psychotherapy (with patient) - 30 (16-37*) min    Current Presentation:   Patient has been relaxing in her recliner this morning.  She attempted to log into a virtual appointment with her outpatient pharmacist but they cancelled the appointment upon learning patient was in the ED.  Patient presents with flat affect, soft to normal speech volume, good eye contact, and future thought orientation.      Patient reports feeling better this morning \"knowing that I am here working on my mental health.\"  She denies current suicidal ideations.  She is going to continue working with established psychiatrist and is open to getting scheduled with new therapy virtually.  Writer was able to find a virtual therapy appointment for next week which patient agreed to schedule and attend.      Patient notes that work has also been difficult to attend and engage with due to her current depression level so she is asking for a work letter through the end of this week.   Patient is hoping discharge home today after meeting with provider.   Em[facility] Provider is going to add a medication to augment current antidepressants and provided education about Gene Sight testing.      Additional Collateral Information:  The following information was received from Shania Granados whose relationship to the patient is girlfriend. Information was obtained via phone. Their phone number is " [phone] and they last had contact with patient on yesterday.    What happened today:  worsening depression, chronic GI issues, medication changes that could be contributing to increased SI.  Patient stayed home from work yesterday and has been less interested in things/activities that have brought her antonio.      What is different about patient's functioning: worsening depression, less engagement in activities, suicidal thoughts    Concern about alcohol/drug use: Yes use THC vape pens to reduce physical pain/side effects.    What do you think the patient needs: medication adjustment to address increased depression and SI; therapy    Has patient made comments about wanting to kill themselves/others:  Yes expressed SI Monday and yesterday; passive plan of having car running in garage with no intent to act on it    If d/c is recommended, can they take part in safety/aftercare planning: Yes willing to support discharge home once depression and SI are stabilized       Mental Status Exam     Affect: Appropriate and Flat   Appearance: Appropriate    Attention Span/Concentration: Attentive  Eye Contact: Engaged   Fund of Knowledge: Appropriate    Language /Speech Content: Fluent   Language /Speech Volume: Soft and Normal    Language /Speech Rate/Productions: Normal    Recent Memory: Intact   Remote Memory: Intact   Mood: Depressed and Normal    Orientation to Person: Yes    Orientation to Place: Yes   Orientation to Time of Day: Yes    Orientation to Date: Yes    Situation (Do they understand why they are here?): Yes    Psychomotor Behavior: Normal    Thought Content: Clear   Thought Form: Goal Directed and Intact     Diagnosis:    Depression, unspecified depression type F32.A       Therapeutic Intervention(s):   Provided active listening, unconditional positive regard, and validation. Engaged in safety planning.  Engaged in guided discovery, explored patient's perspectives and helped expand them through socratic  dialogue. Coached on coping techniques/relaxation skills to help improve distress tolerance and managing intense emotions. Reviewed healthy living that supports positive mental health, including looking at sleep hygiene, regular movement, nutrition, and regular socialization. Provided positive reinforcement for progress towards goals, gains in knowledge, and application of skills previously taught.  Identified and practiced coping skills. Engaged in relaxation training (e.g. meditation, progressive muscle relaxation, etc.). Identified stress relief practices. Explored motivation for therapy.    Treatment Objective(s) Addressed:   The focus of this session was on rapport building, orienting the patient to therapy, identifying and practicing coping strategies, identifying an appropriate aftercare plan, assessing safety, and identifying additional supports.     Progress Towards Goals:   Patient reports improving symptoms. Patient is making progress towards treatment goals as evidenced by feeling less hopeless today knowing she is working on a plan to improve mental health, reduced SI this morning, and looking forward to discharging.     Case Management:   none     Plan and Clinical Summary and Substantiation of Recommendations:   Discharge: Patient is ready for discharge home via girlfriend when they are off work.  Patient reports depression is still present however SI is diminished to a passive and infrequent status.  Patient has been set up with new virtual therapy and will continue with established outpatient psychiatry.  Patient is also living with chronic GI issues which has caused her increased depression and suicidal ideation.     Attending concurs with disposition: Yes         LETY Lowe

## 2023-09-13 NOTE — PROGRESS NOTES
Pt reported nausea this morning, which was relieved with Zofran.  Pt denies SI and anxiety this morning.  She has been isolative to the recliner watching television through the morning.  She ate well at breakfast, and makes needs known.

## 2023-09-14 DIAGNOSIS — F99 INSOMNIA DUE TO OTHER MENTAL DISORDER: ICD-10-CM

## 2023-09-14 DIAGNOSIS — F51.05 INSOMNIA DUE TO OTHER MENTAL DISORDER: ICD-10-CM

## 2023-09-14 NOTE — TELEPHONE ENCOUNTER
Last Seen 8/15  RTC 4 weeks  Cancel None  No-Show None    Next Appt 9/15    Incoming Refill From [facility] via fax    Medication Requested   zolpidem (AMBIEN) 10 MG tablet     Directions   Take 1 tablet (10 mg) by mouth nightly as needed for sleep     Qty 30    Last Refill Per [place]  8/7 #30, 6/21 #30, 4/27 #30      Medication pended and routed to provider for approval.

## 2023-09-15 ENCOUNTER — TELEPHONE (OUTPATIENT)
Dept: PSYCHOLOGY | Facility: CLINIC | Age: 38
End: 2023-09-15
Payer: COMMERCIAL

## 2023-09-15 ENCOUNTER — VIRTUAL VISIT (OUTPATIENT)
Dept: PSYCHIATRY | Facility: CLINIC | Age: 38
End: 2023-09-15
Attending: PSYCHIATRY & NEUROLOGY
Payer: COMMERCIAL

## 2023-09-15 DIAGNOSIS — F33.9 MAJOR DEPRESSIVE DISORDER, RECURRENT EPISODE WITH ANXIOUS DISTRESS (H): Primary | ICD-10-CM

## 2023-09-15 DIAGNOSIS — F99 INSOMNIA DUE TO OTHER MENTAL DISORDER: ICD-10-CM

## 2023-09-15 DIAGNOSIS — F51.05 INSOMNIA DUE TO OTHER MENTAL DISORDER: ICD-10-CM

## 2023-09-15 PROCEDURE — 99214 OFFICE O/P EST MOD 30 MIN: CPT | Mod: VID | Performed by: PSYCHIATRY & NEUROLOGY

## 2023-09-15 RX ORDER — ZOLPIDEM TARTRATE 10 MG/1
10 TABLET ORAL
Qty: 30 TABLET | Refills: 0 | Status: SHIPPED | OUTPATIENT
Start: 2023-09-15 | End: 2023-10-12

## 2023-09-15 RX ORDER — AMITRIPTYLINE HYDROCHLORIDE 50 MG/1
50 TABLET ORAL AT BEDTIME
Qty: 30 TABLET | Refills: 1 | Status: SHIPPED | OUTPATIENT
Start: 2023-09-15 | End: 2023-11-21

## 2023-09-15 RX ORDER — GABAPENTIN 300 MG/1
300 CAPSULE ORAL
Qty: 30 CAPSULE | Refills: 1 | Status: SHIPPED | OUTPATIENT
Start: 2023-09-15 | End: 2023-11-02

## 2023-09-15 ASSESSMENT — PATIENT HEALTH QUESTIONNAIRE - PHQ9
SUM OF ALL RESPONSES TO PHQ QUESTIONS 1-9: 21
10. IF YOU CHECKED OFF ANY PROBLEMS, HOW DIFFICULT HAVE THESE PROBLEMS MADE IT FOR YOU TO DO YOUR WORK, TAKE CARE OF THINGS AT HOME, OR GET ALONG WITH OTHER PEOPLE: EXTREMELY DIFFICULT
SUM OF ALL RESPONSES TO PHQ QUESTIONS 1-9: 21

## 2023-09-15 NOTE — NURSING NOTE
Is the patient currently in the state of [place]? YES    Visit mode:VIDEO    If the visit is dropped, the patient can be reconnected by: VIDEO VISIT: Text to cell phone:   Telephone Information:   Mobile [phone]       Will anyone else be joining the visit? NO  (If patient encounters technical issues they should call [phone])    How would you like to obtain your AVS? MyChart    Are changes needed to the allergy or medication list? Pt stated no changes to allergies and Pt stated no med changes    Reason for visit: YONI MICHELLEF

## 2023-09-15 NOTE — PATIENT INSTRUCTIONS
PLAN                                                                                                       1) Meds    CONTINUE Bupropion (Wellbutrin) XL to 300 mg daily in the morning  CONTINUE Propranolol 10 mg twice daily as needed for anxiety/panic (avoid taking if you feel lightheaded or dehydrated due to ongoing GI upset)  CONTINUE  Ambien 10 mg at bedtime as needed for sleep   CONTINUE Amitriptyline 50 mg daily at bedtime for depression  START Gabapentin 300 mg daily as needed for anxiety and insomnia     2) Other: Genesight Testing- will be sent to your home.     3) RTC: In 4 weeks.     4) Crisis numbers: For crisis resources, please see the information at the end of this document.    Patient Education      Thank you for coming to the [facility] [place] [facility].     Lab Testing:  If you had lab testing today and your results are reassuring or normal they will be mailed to you or sent through [employer] within 7 days. If the lab tests need quick action we will call you with the results. The phone number we will call with results is # [phone]. If this is not the best number please call our clinic and change the number.     Medication Refills:  If you need any refills please call your pharmacy and they will contact us. Our fax number for refills is [phone].   Three business days of notice are needed for general medication refill requests.   Five business days of notice are needed for controlled substance refill requests.   If you need to change to a different pharmacy, please contact the new pharmacy directly. The new pharmacy will help you get your medications transferred.     Contact Us:  Please call [phone] during business hours (8-5:00 M-F).   If you have medication related questions after clinic hours, or on the weekend, please call [phone].     Financial Assistance [phone]   Medical Records [phone]       [facility] HEALTH CRISIS RESOURCES:  For a  emergency help, please call 911 or go to the nearest Emergency Department.     Emergency Walk-In Options:   EmPATH Unit @ [facility] ([place]): [phone] - Specialized mental health emergency area designed to be calming  [facility] ([place]): [phone]  [facility] ([place]): [phone]  [facility]): [phone]    County Crisis Information:   [place]: [phone]  [place]: [phone]  [place] ([place]) - Adult: [phone]     [place]: [phone]  [place] - Adult: [phone]     Child: [phone]  [place]: [phone]  List of all [facility] resources:   https://[place].[facility]/dhs/people-we-serve/adults/health-care/mental-health/resources/crisis-contacts.jsp    National Crisis Information:   Crisis Text Line: Text  [place]  to 940535  Suicide & Crisis Lifeline: 988  National Suicide Prevention Lifeline: 3-468-606-TALK (1-725.748.4840)       For online chat options, visit https://suicidepreventionlifeline.org/chat/  Poison Control Center: 1-735.264.2351  Trans Lifeline: 1-388.650.8701 - Hotline for transgender people of all ages  The [place] Project: 1-740-574-0942 - Hotline for LGBT youth     For Non-Emergency Support:   Fast Tracker: Mental Health & Substance Use Disorder Resources -   https://www.[employer].org/

## 2023-09-15 NOTE — TELEPHONE ENCOUNTER
MTM referral from: [facility] visit (referral by provider)    MTM referral outreach attempt #1 on September 15, 2023 at 11:37 AM      Outcome: Patient is not interested at this time because she's good, will route to MTM Pharmacist/Provider as an FYI. Thank you for the referral.     Use medica uplan  for the carrier/Plan on the flowsheet    Sandy Wagner - [facility]

## 2023-09-15 NOTE — PROGRESS NOTES
Virtual Visit Details     Type of service:  Video Visit     Originating Location (pt. Location): Home    Distant Location (provider location):  Off-site  Platform used for Video Visit: [facility]  Psychiatry Clinic  PSYCHIATRY PROGRESS NOTE     CARE TEAM:  PCP- No Ref-Primary, Physician   GI- at [facility] .  Viktoria is a 38 year old who prefers the name Viktoria and uses pronouns she, her.     DIAGNOSES                                                                                        Major Depressive Disorder with Anxious Distress  Insomnia    ASSESSMENT                                                                                          Viktoria Guillen is a 37 year old female with symptoms supporting a diagnosis of MAJOR DEPRESSIVE DISORDER with anxious distress. While she initially felt some improvement on Wellbutrin  mg, she now reports significant increase in depressed mood since June. She continues to experience anxiety and fatigue as well. Sleep has worsened, but nutrition has improved. Viktoria requests a referral to explore alternative options for managing depression at this time. She is interested in both TMS and ketamine. We also discussed medication changes that might help in the meantime. Reviewed past medication trials. From Viktoria's memory of what worked best with the least amount of side effects, she is most inclined to restart amitriptyline 25 mg + perphenazine.    Since the last visit, she was seen at the [facility] unit earlier this week, where amitriptyline 50 mg was started. So far, she notes no side effects on this. Continues to struggle with anxiety and insomnia. Collaboratively agreed to continue amitriptyline 50 mg and start gabapentin 300 mg for additional support for nighttime anxiety and insomnia. Will also pursue Genesight testing.     MNPMP was checked today:  Indicates taking controlled medication as prescribed.    PLAN                                      "                                                                  1) Meds    CONTINUE Bupropion (Wellbutrin) XL to 300 mg daily in the morning  CONTINUE Propranolol 10 mg twice daily as needed for anxiety/panic (avoid taking if you feel lightheaded or dehydrated due to ongoing GI upset)  CONTINUE  Ambien 10 mg at bedtime as needed for sleep   CONTINUE Amitriptyline 50 mg daily at bedtime for depression  START Gabapentin 300 mg daily as needed for anxiety and insomnia     2) Other: Genesight Testing- will be sent to your home.     3) RTC: In 4 weeks.     4) Crisis numbers in AVS.     CHIEF COMPLAINT                                      \" Things have been getting worse these past two months \"     PERTINENT BACKGROUND                                         [initial eval 02/16/23]     Viktoria Guillen is a 37 year old female with a significant history of depressive episodes starting at age 14. She reports many past medication trials, as well as a long history of working with a psychotherapist. She has had several episodes of suicidal ideation over the years, with the most significant occurring in 2015. At that time, she had no attempts or concrete plan, but had daily intrusive SI thoughts. In 2013 she had one episode of self-injury that involved cutting her wrist with a steak knife. She reports many episodes of depression over the past 23 years. She also reports a history of anxiety.     Psych pertinent item history includes SIB     HISTORY OF PRESENT ILLNESS                                                      Viktoria was seen in the [facility] unit earlier this week for increase in depressive symptoms and suicidal ideation. She denies having a plan at the time she went to [facility], but notes that she \"didn't trust herself to stay safe.\" [facility] the [facility] unit was therapeutic and a place she would feel comfortable going back to if needed. Notes that decline in physical health contributed to her worsening depression. Today, she " "feels physically pretty good. She was started on amitriptyline 50 mg in the [facility] unit. Notes she has taken two doses of amitriptyline since discharge. Has not yet noted any side effects since starting amitriptyline. The first night after [facility] she slept well, but last night she reports she didn't sleep at all even with Ambien. Denies nightmares. Endorses \"mind not winding down\" as the major contributor to her insomnia. Notes her anxiety was \"really bad\" when she went to [facility], though it is \"less extreme\" today. Still quite anxious about losing her job. Had [place] approved for her physical health issues. Notes she has been feeling \"trapped\" and \"frustrated\" by her work lately. Lives with her girlfriend who is supportive and caring.      Received a referral to a new therapist while in [facility]: Therapist- Leeanna Jackson SI is present, but less intense, more fleeting at today's visit. Feels safe at home.     Recent Symptoms:   Depression:  depressed mood, low energy, insomnia, feeling trapped and overwhelmed-  Anxiety:  excessive worry and nervous/overwhelmed  Panic Attack:  peaks in < several  mins, occurs 2x per week, triggers are known, palpitations, diaphoresis and tremors - none since last visit, see HPI for details     Adverse Med Effects:  See table below  Pertinent Negatives:  No   Recent Substance Use:    None reported    SOCIAL and FAMILY HISTORY                                                 per pt report         Family Hx:  Not obtained at this visit.     Social Hx:  Social Support - good social support from girlfriend \"great relationship\"    PAST PSYCH and SUBSTANCE USE HISTORY                      Psych:  Suicidal ideation - Past, none currently   SIB - One prior episode 2013, none currently      Substance Use:  No additional substance use history.    MEDICAL HISTORY     Patient Active Problem List   Diagnosis    Depression with anxiety    Chronic idiopathic constipation    ADD (attention deficit " disorder)    Dyspepsia    Essential hypertension    Gastroparesis    GERD (gastroesophageal reflux disease)    Insomnia    Sleep disorder    Social anxiety disorder    Spastic colon    Suicidal ideation    Depression, unspecified depression type     Has been physically ill since October. Unintentional 20 pound weight loss since October. GI specialist through [facility].     [place] Provider: Alexandra (not PCP).    ALLERGIES: Ginger and Citalopram     MEDICAL REVIEW OF SYSTEMS                                                                  none in addition to that documented above    CURRENT MEDS       Current Outpatient Medications   Medication Sig Dispense Refill    amitriptyline (ELAVIL) 50 MG tablet Take 1 tablet (50 mg) by mouth At Bedtime 30 tablet 1    gabapentin (NEURONTIN) 300 MG capsule Take 1 capsule (300 mg) by mouth nightly as needed for other (Insomnia and anxiety) 30 capsule 1    buPROPion (WELLBUTRIN XL) 300 MG 24 hr tablet Take 1 tablet (300 mg) by mouth every morning 30 tablet 11    dicyclomine (BENTYL) 10 MG capsule Take 10 mg by mouth 3 times daily as needed (with meals)      docusate sodium (DSS) 250 MG capsule Take 250 mg by mouth daily      metoclopramide (REGLAN) 5 MG tablet 5 mg 2 times daily as needed (Nausea)      ondansetron (ZOFRAN ODT) 4 MG ODT tab Take 4 mg by mouth every 6 hours as needed for nausea      pantoprazole (PROTONIX) 40 MG EC tablet Take 40 mg by mouth daily      prochlorperazine (COMPAZINE) 10 MG tablet Take 10 mg by mouth every 6 hours as needed for nausea      propranolol (INDERAL) 10 MG tablet Take 1 tablet (10 mg) by mouth 2 times daily 60 tablet 1    PROPRANOLOL HCL PO Take 10 mg by mouth as needed for high blood pressure      simethicone (MYLICON) 125 MG chewable tablet Take 125 mg by mouth 4 times daily as needed for intestinal gas      zolpidem (AMBIEN) 10 MG tablet Take 1 tablet (10 mg) by mouth nightly as needed for sleep 30 tablet 0        Psychotropic  Medication Trials      Medication Max Dose (mg) Dates / Duration Helpful? DC Reason / Adverse Effects?   Zoloft (sertraline) Unknown Unknown No None   Paxil (paroxetine) Unknown Unknown No Cause SI at the age of 14   Celexa (citalopram) Unknown Unknown No Full body rash/hives in patient's 20s    Lexapro (escitalopram) Unknown Unknown No None   Wellbutrin  (bupropion) 100mg IR Unknown Yes None   Remeron (mirtazapine) Unknown Unknown Yes Weight gain of 30 pounds in one month   Viibryd (vilazodone) Unknown Unknown Yes Reported long withdrawal    Elavil (amitriptyline) 25-50mg Unknown Yes Somewhat helpful at 25mg; described a strange sensation of mental fog/slowing, but not sedation at 50mg   Abilify (aripriprazole) Unknown Unknown No Reports sedation     Neurontin (gabapentin) 300mg Unknown Unsure Unsure   Inderal (propranolol) 10mg Unknown Yes None   Ambien (zolpidem) 10mg Unknown Yes None   Melatonin Unknown Unknown No None   Xanax (alprazolam) Unknown Unknown Yes None   Vistaril / Atarax (hydroxyzine) 50mg Unknown No Used for sleep, cause some drowsiness, but not enough for sleep onset       VITALS                                                                                              There were no vitals taken for this visit.   MENTAL STATUS EXAM                                                             Alertness: alert   Appearance: adequately groomed  Behavior/Demeanor: cooperative, with good  eye contact   Speech: regular rate and rhythm  Language: intact  Psychomotor: normal or unremarkable  Mood: depressed  Affect: flat; congruent to: mood- yes, content- yes  Thought Process/Associations: unremarkable  Thought Content:  Reports suicidal ideation;  Denies violent ideation  Perception:  Reports none;  Denies hallucinations  Insight: adequate  Judgment: appropriate  Cognition: does  appear grossly intact; formal cognitive testing was not done  oriented: time, person, and place  attention span:  intact  concentration: intact  recent memory: intact  remote memory: intact  fund of knowledge: appropriate  Gait and Station: N/A (telehealth)    LABS and DATA         5/16/2023     7:42 AM 8/15/2023     8:34 AM 9/15/2023     7:36 AM   PHQ   PHQ-9 Total Score 15 22 21   Q9: Thoughts of better off dead/self-harm past 2 weeks Not at all Several days More than half the days   F/U: Thoughts of suicide or self-harm  No Yes   F/U: Self harm-plan   Yes   F/U: Self-harm action   No   F/U: Safety concerns  No No       PSYCHOTROPIC DRUG INTERACTIONS   none     MANAGEMENT:  N/A    RISK STATEMENT for SAFETY   Viktoria endorsed suicidal ideation. SUICIDE RISK ASSESSMENT-  Risk factors for self-harm: recent symptom worsening.  Mitigating factors: describes a safety plan, h/o seeking help , future oriented, and good social support  .  The patient does not appear to be at imminent risk for self-harm, hospitalization is not recommended which the pt does  agree to. No hospitalization will be arranged. Based on degree of symptoms therapy and close psych follow-up was/were recommended which the pt does  agree to. Additional steps to minimize risk: anxiety/ insomnia mngmt, med changes, and expand care team.  Safety Plan placed in Pt Instructions: Yes.  Rate SI-desire: 0/5, intent: 0/5.    TREATMENT RISK STATEMENT:  The risks, benefits, alternatives and potential adverse effects have been discussed and are understood by the pt. The pt understands the risks of using street drugs or alcohol. There are no medical contraindications, the pt agrees to treatment with the ability to do so. The pt knows to call the clinic for any problems or to access emergency care if needed.  Medical and substance use concerns are documented above.  Psychotropic drug interaction check was done, including changes made today.    PROVIDER:  BENITO Cho CNP       MEDICAL DECISION MAKING        (SmartPhsindi .PSYCHBILSLIME)     Level of Medical Decision Making:    - At least 1 chronic problem that is not stable  - Engaged in prescription drug management during visit (discussed any medication benefits, side effects, alternatives, etc.)

## 2023-09-26 ENCOUNTER — TELEPHONE (OUTPATIENT)
Dept: PSYCHIATRY | Facility: CLINIC | Age: 38
End: 2023-09-26
Payer: COMMERCIAL

## 2023-09-26 ENCOUNTER — MYC MEDICAL ADVICE (OUTPATIENT)
Dept: PSYCHIATRY | Facility: CLINIC | Age: 38
End: 2023-09-26
Payer: COMMERCIAL

## 2023-10-02 NOTE — TELEPHONE ENCOUNTER
----- Message from BENITO Cho CNP sent at 9/15/2023  4:20 PM CDT -----  Regarding: [employer]  Please send Viktoria [employer] packet. Thank you.

## 2023-10-06 ENCOUNTER — TRANSFERRED RECORDS (OUTPATIENT)
Dept: HEALTH INFORMATION MANAGEMENT | Facility: CLINIC | Age: 38
End: 2023-10-06
Payer: COMMERCIAL

## 2023-10-12 ENCOUNTER — TELEPHONE (OUTPATIENT)
Dept: PSYCHIATRY | Facility: CLINIC | Age: 38
End: 2023-10-12
Payer: COMMERCIAL

## 2023-10-12 ENCOUNTER — MYC REFILL (OUTPATIENT)
Dept: PSYCHIATRY | Facility: CLINIC | Age: 38
End: 2023-10-12
Payer: COMMERCIAL

## 2023-10-12 DIAGNOSIS — F51.05 INSOMNIA DUE TO OTHER MENTAL DISORDER: ICD-10-CM

## 2023-10-12 DIAGNOSIS — F99 INSOMNIA DUE TO OTHER MENTAL DISORDER: ICD-10-CM

## 2023-10-12 DIAGNOSIS — F33.9 MAJOR DEPRESSIVE DISORDER, RECURRENT EPISODE WITH ANXIOUS DISTRESS (H): Primary | ICD-10-CM

## 2023-10-12 NOTE — TELEPHONE ENCOUNTER
18 pages if [email or web address] test results were received from [employer]. The original copies were faxed to scanning and are held in Psych until scanning is confirmed.  An MTM order has been placed.    Bianka Paiz LPN Lead

## 2023-10-13 NOTE — CONFIDENTIAL NOTE
Last appt:  9/15  Next appt:  10/19     Disp Refills Start End KAMLESH   zolpidem (AMBIEN) 10 MG tablet 30 tablet 0 9/15/2023  No   Sig - Route: Take 1 tablet (10 mg) by mouth nightly as needed for sleep - Oral     :      Per last appt:  CONTINUE  Ambien 10 mg at bedtime as needed for sleep     Pended for [facility] [address], [place], [place], [address] per patient request    Patient states that rx signature on 10/17 is OK.

## 2023-10-17 RX ORDER — ZOLPIDEM TARTRATE 10 MG/1
10 TABLET ORAL
Qty: 30 TABLET | Refills: 0 | Status: SHIPPED | OUTPATIENT
Start: 2023-10-17 | End: 2023-10-19

## 2023-10-19 ENCOUNTER — VIRTUAL VISIT (OUTPATIENT)
Dept: PSYCHIATRY | Facility: CLINIC | Age: 38
End: 2023-10-19
Attending: PSYCHIATRY & NEUROLOGY
Payer: COMMERCIAL

## 2023-10-19 DIAGNOSIS — F51.05 INSOMNIA DUE TO OTHER MENTAL DISORDER: Primary | ICD-10-CM

## 2023-10-19 DIAGNOSIS — F33.9 MAJOR DEPRESSIVE DISORDER, RECURRENT EPISODE WITH ANXIOUS DISTRESS (H): ICD-10-CM

## 2023-10-19 DIAGNOSIS — F99 INSOMNIA DUE TO OTHER MENTAL DISORDER: Primary | ICD-10-CM

## 2023-10-19 PROCEDURE — 99214 OFFICE O/P EST MOD 30 MIN: CPT | Mod: VID | Performed by: PSYCHIATRY & NEUROLOGY

## 2023-10-19 RX ORDER — ESZOPICLONE 1 MG/1
1 TABLET, FILM COATED ORAL AT BEDTIME
Qty: 30 TABLET | Refills: 1 | Status: SHIPPED | OUTPATIENT
Start: 2023-10-19 | End: 2023-11-02

## 2023-10-19 ASSESSMENT — PAIN SCALES - GENERAL: PAINLEVEL: NO PAIN (0)

## 2023-10-19 ASSESSMENT — PATIENT HEALTH QUESTIONNAIRE - PHQ9
SUM OF ALL RESPONSES TO PHQ QUESTIONS 1-9: 5
10. IF YOU CHECKED OFF ANY PROBLEMS, HOW DIFFICULT HAVE THESE PROBLEMS MADE IT FOR YOU TO DO YOUR WORK, TAKE CARE OF THINGS AT HOME, OR GET ALONG WITH OTHER PEOPLE: SOMEWHAT DIFFICULT
SUM OF ALL RESPONSES TO PHQ QUESTIONS 1-9: 5

## 2023-10-19 NOTE — PATIENT INSTRUCTIONS
PLAN                                                                                                       1) Meds    DISCONTINUE  Ambien 10 mg at bedtime as needed for sleep     START Eszopiclone (Lunesta) 1 mg, with option to increase as needed to 3 mg.     CONTINUE Bupropion (Wellbutrin) XL to 300 mg daily in the morning  CONTINUE Propranolol 10 mg twice daily as needed for anxiety/panic   CONTINUE Amitriptyline 50 mg daily at bedtime for depression  CONTINUE Gabapentin 300 mg daily as needed for anxiety/insomnia     2) Other: Genesight Testing- completed, follow up with PharmD on 11/1/23.     3) RTC: In 4 weeks for 60 minutes > repeat diagnostic assessment.     4) Crisis numbers: For crisis resources, please see the information at the end of this document.    Patient Education      Thank you for coming to the [facility].     Lab Testing:  If you had lab testing today and your results are reassuring or normal they will be mailed to you or sent through plista within 7 days. If the lab tests need quick action we will call you with the results. The phone number we will call with results is # [phone]. If this is not the best number please call our clinic and change the number.     Medication Refills:  If you need any refills please call your pharmacy and they will contact us. Our fax number for refills is [phone].   Three business days of notice are needed for general medication refill requests.   Five business days of notice are needed for controlled substance refill requests.   If you need to change to a different pharmacy, please contact the new pharmacy directly. The new pharmacy will help you get your medications transferred.     Contact Us:  Please call [phone] during business hours (8-5:00 M-F).   If you have medication related questions after clinic hours, or on the weekend, please call [phone].     Financial Assistance [phone]    Medical Records [phone]       MENTAL HEALTH CRISIS RESOURCES:  For a emergency help, please call 911 or go to the nearest Emergency Department.     Emergency Walk-In Options:   [facility] Unit @ [facility] ([place]): [phone] - Specialized mental health emergency area designed to be calming  [facility] ([place]): [phone]  [facility] ([place]): [phone]  [facility] ([place]): [phone]    [facility] Crisis Information:   [facility]: [phone]  [facility]: [phone]  [facility] ([place]) - Adult: [phone]     Child: [phone]  [facility] - Adult: [phone]     Child: [phone]  [place]: [phone]  List of all [facility] resources:   [email or web address]/dhs/people-[email or web address]-care/mental-health/resources/crisis-contacts.jsp    National Crisis Information:   Crisis Text Line: Text  [place]  to 355286  Suicide & Crisis Lifeline: 988  National Suicide Prevention Lifeline: 4-045-542-TALK (1-583.665.7679)       For online chat options, visit https://suicidepreventionlifeline.org/chat/  Poison Control Center: 5-426-972-1355  Trans Lifeline: 1-975.525.7592 - Hotline for transgender people of all ages  The [employer] Project: 5-983-098-5156 - Hotline for LGBT youth     For Non-Emergency Support:   Fast Tracker: Mental Health & Substance Use Disorder Resources -   https://www.Relaborat[employer]ack[employer]mian.org/

## 2023-10-19 NOTE — NURSING NOTE
Is the patient currently in the state of [place]? YES    Visit mode:VIDEO    If the visit is dropped, the patient can be reconnected by: VIDEO VISIT: Text to cell phone:   Telephone Information:   Mobile [phone]       Will anyone else be joining the visit? NO  (If patient encounters technical issues they should call [phone]    How would you like to obtain your AVS? MyChart    Are changes needed to the allergy or medication list? No    Reason for visit: RECHECK    Sofy RILEY

## 2023-10-19 NOTE — PROGRESS NOTES
Viktoria Guillen is a 38 year old who is being evaluated via a billable video visit.      Pt will join video visit via: CHORD  If there are problems joining the visit, send backup video invite via: Text to preferred phone: [phone]    Originating location (patient location): Patient's place of employment    Will anyone else be joining the visit? No

## 2023-10-19 NOTE — PROGRESS NOTES
[facility]  Psychiatry Clinic  PSYCHIATRY PROGRESS NOTE     CARE TEAM:  PCP- No Ref-Primary, Physician   [place]- [place] [facility] [place]  Viktoria is a 38 year old who prefers the name Viktoria and uses pronouns she, her.     DIAGNOSES                                                                                        Major Depressive Disorder with Anxious Distress  Insomnia    ASSESSMENT                                                                                          Viktoria Guillen is a 37 year old female with symptoms supporting a diagnosis of MAJOR DEPRESSIVE DISORDER with anxious distress. While she initially felt some improvement on Wellbutrin  mg, she now reports significant increase in depressed mood since June. She continues to experience anxiety and fatigue as well. Sleep has worsened, but nutrition has improved. Viktoria pursued a referral to [facility] for ketamine treatment for depression. She reports a significant response so far in terms of mood, anxiety, and physical GI distress symptoms. Insomnia continues to be an issue and has possibly been worsened since starting ketamine. She has not noticed much improvement with gabapentin 300 mg for additional support for nighttime anxiety and insomnia. Recently completed [email or web address] testing and has MTM scheduled for 11/1/23.    Would like to repeat her diagnostic assessment to understand remaining symptoms as the depression resolves.   Rule out OCD; ASD, ADHD.   Consider the need for psychological testing.     Collaboratively agreed to try switching sleep hypnotics today. Benzodiazepines appear to have some evidence suggesting they may reduce the duration of ketamine's antidepressant effect (Thomasrt, et al, 2021). Therefore will avoid temazepam at this time. Will trial eszopiclone (Lunesta) 1 mg, with option to titrate up as needed to 3 mg.     MNPMP was checked today:  Indicates taking controlled medication as prescribed.    PLAN               "                                                                                         1) Meds    CONTINUE Bupropion (Wellbutrin) XL to 300 mg daily in the morning  CONTINUE Propranolol 10 mg twice daily as needed for anxiety/panic (avoid taking if you feel lightheaded or dehydrated due to ongoing GI upset)  DISCONTINUE  Ambien 10 mg at bedtime as needed for sleep   CONTINUE Amitriptyline 50 mg daily at bedtime for depression  CONTINUE Gabapentin 300 mg daily as needed for anxiety and insomnia   START Eszopiclone (Lunesta) 1 mg, with option to titrate up as needed to 3 mg.     2) Other: [employer] Testing- completed, follow up with PharmD on 11/1/23.     3) RTC: In 4 weeks for 60 minutes > repeat diagnostic assessment.     4) Crisis numbers in AVS.     CHIEF COMPLAINT                                      \" Things are much better since starting ketamine infusions \"     PERTINENT BACKGROUND                                         [initial eval 02/16/23]     Viktoria Guillen is a 37 year old female with a significant history of depressive episodes starting at age 14. She reports many past medication trials, as well as a long history of working with a psychotherapist. She has had several episodes of suicidal ideation over the years, with the most significant occurring in 2015. At that time, she had no attempts or concrete plan, but had daily intrusive SI thoughts. In 2013 she had one episode of self-injury that involved cutting her wrist with a steak knife. She reports many episodes of depression over the past 23 years. She also reports a history of anxiety.     Psych pertinent item history includes SIB     HISTORY OF PRESENT ILLNESS                                                      Since the last visit, Viktoria reports she is doing well. She started ketamine infusions at [facility]. She reports \"huge noticeable differences in mood\" since the second infusion. She also started weekly therapy, which has been going really " "well. Reports her therapist has noted obsessive thoughts and wonders if she should be evaluated for OCD. \"Everything except for my insomnia has been great.\" Reports she struggles to wind down at night. Sleep fluctuates with ketamine infusions. Worse the night of an infusion, then improves each day. When she is struggling to sleep, she notes anxieties and thinking of to do list items. Notes the obsessive thoughts occupy her brain more during the daytime.     Ketamine: 6th infusion is today. Has an appointment with ketamine provider on Monday to re-evaluate long term plan. 7th infusion is scheduled for next week and as of now, this is her last infusion. Notes significant improvement in physical GI symptoms since starting the infusions. Appetite is similarly low, but she is able to eat more and feels like she is digesting food better. Able to tolerate a larger variety of foods.     Reports it hit her yesterday that \"I am better,\" and that feeling was overwhelming. She states she already feels very distant from the SI, which last occurred three weeks ago. Today reports no SI. She got engaged last week. Reports the ketamine treatments have been really healthy for her relationship. Viktoria reports she is participating in household chores now that she hasn't participated in for months.     Anxiety- has also improved with ketamine treatments. Feels she is able to let some fearful thoughts go. Noticing less irrational/fearful thoughts. Still noticing some social anxiety and ruminating on awkward social interactions. Overanalyzing conversations with people. Still noticing self-criticism tendencies.     Sleep- Around 2000 takes propranolol, amitriptyline, gabapentin, and then around 2100 or 2130 takes Ambien. Takes until 2300 or later to fall asleep. Typically wakes once per night either because of the cat making noise or because of needing to use the bathroom. Sometimes falls back to sleep, sometimes does not. Wakes for the day " "around 0630.     Recent Symptoms:   Depression:  insomnia-see HPI for additional details  Anxiety:  excessive worry and nervous/overwhelmed  Panic Attack:  peaks in < several  mins, occurs 2x per week, triggers are known, palpitations, diaphoresis and tremors - none since last visit, see HPI for details     Adverse Med Effects:  See table below  Pertinent Negatives:  No   Recent Substance Use:    None reported    SOCIAL and FAMILY HISTORY                                                 per pt report         Family Hx:  Not obtained at this visit.     Social Hx:  Social Support - good social support from girlfriend \"great relationship\"    PAST PSYCH and SUBSTANCE USE HISTORY                      Psych:  Suicidal ideation - Past, none currently   SIB - One prior episode 2013, none currently      Substance Use:  No additional substance use history.    MEDICAL HISTORY     Patient Active Problem List   Diagnosis    Depression with anxiety    Chronic idiopathic constipation    ADD (attention deficit disorder)    Dyspepsia    Essential hypertension    Gastroparesis    GERD (gastroesophageal reflux disease)    Insomnia    Sleep disorder    Social anxiety disorder    Spastic colon    Suicidal ideation    Depression, unspecified depression type     Has been physically ill since October. Unintentional 20 pound weight loss since October. GI specialist through [facility].     [place] Provider: Alexandra (not PCP).    ALLERGIES: Razia and Citalopram     MEDICAL REVIEW OF SYSTEMS                                                                  none in addition to that documented above    CURRENT MEDS       Current Outpatient Medications   Medication Sig Dispense Refill    eszopiclone (LUNESTA) 1 MG tablet Take 1 tablet (1 mg) by mouth at bedtime 30 tablet 1    amitriptyline (ELAVIL) 50 MG tablet Take 1 tablet (50 mg) by mouth At Bedtime 30 tablet 1    buPROPion (WELLBUTRIN XL) 300 MG 24 hr tablet Take 1 tablet (300 mg) by mouth " every morning 30 tablet 11    dicyclomine (BENTYL) 10 MG capsule Take 10 mg by mouth 3 times daily as needed (with meals)      docusate sodium (DSS) 250 MG capsule Take 250 mg by mouth daily      gabapentin (NEURONTIN) 300 MG capsule Take 1 capsule (300 mg) by mouth nightly as needed for other (Insomnia and anxiety) 30 capsule 1    metoclopramide (REGLAN) 5 MG tablet 5 mg 2 times daily as needed (Nausea)      ondansetron (ZOFRAN ODT) 4 MG ODT tab Take 4 mg by mouth every 6 hours as needed for nausea      pantoprazole (PROTONIX) 40 MG EC tablet Take 40 mg by mouth daily      prochlorperazine (COMPAZINE) 10 MG tablet Take 10 mg by mouth every 6 hours as needed for nausea      propranolol (INDERAL) 10 MG tablet Take 1 tablet (10 mg) by mouth 2 times daily 60 tablet 1    PROPRANOLOL HCL PO Take 10 mg by mouth as needed for high blood pressure      simethicone (MYLICON) 125 MG chewable tablet Take 125 mg by mouth 4 times daily as needed for intestinal gas          Psychotropic Medication Trials      Medication Max Dose (mg) Dates / Duration Helpful? DC Reason / Adverse Effects?   Zoloft (sertraline) Unknown Unknown No None   Paxil (paroxetine) Unknown Unknown No Cause SI at the age of 14   Celexa (citalopram) Unknown Unknown No Full body rash/hives in patient's 20s    Lexapro (escitalopram) Unknown Unknown No None   Wellbutrin  (bupropion) 100mg IR Unknown Yes None   Remeron (mirtazapine) Unknown Unknown Yes Weight gain of 30 pounds in one month   Viibryd (vilazodone) Unknown Unknown Yes Reported long withdrawal    Elavil (amitriptyline) 25-50mg Unknown Yes Somewhat helpful at 25mg; described a strange sensation of mental fog/slowing, but not sedation at 50mg   Abilify (aripriprazole) Unknown Unknown No Reports sedation     Neurontin (gabapentin) 300mg Unknown Unsure Unsure   Inderal (propranolol) 10mg Unknown Yes None   Ambien (zolpidem) 10mg Unknown Yes None   Melatonin Unknown Unknown No None   Xanax (alprazolam)  Unknown Unknown Yes None   Vistaril / Atarax (hydroxyzine) 50mg Unknown No Used for sleep, cause some drowsiness, but not enough for sleep onset       VITALS                                                                                              There were no vitals taken for this visit.   MENTAL STATUS EXAM                                                             Alertness: alert   Appearance: adequately groomed  Behavior/Demeanor: cooperative, with good  eye contact   Speech: regular rate and rhythm  Language: intact  Psychomotor: normal or unremarkable  Mood: depressed and description consistent with euthymia  Affect: full range; congruent to: mood- yes, content- yes  Thought Process/Associations: unremarkable  Thought Content:  Reports none;  Denies suicidal & violent ideation and delusions  Perception:  Reports none;  Denies hallucinations  Insight: adequate  Judgment: appropriate  Cognition: does  appear grossly intact; formal cognitive testing was not done  oriented: time, person, and place  attention span: intact  concentration: intact  recent memory: intact  remote memory: intact  fund of knowledge: appropriate  [facility]    LABS and DATA         8/15/2023     8:34 AM 9/15/2023     7:36 AM 10/19/2023     8:23 AM   PHQ   PHQ-9 Total Score 22 21 5   Q9: Thoughts of better off dead/self-harm past 2 weeks Several days More than half the days Not at all   F/U: Thoughts of suicide or self-harm No Yes    F/U: Self harm-plan  Yes    F/U: Self-harm action  No    F/U: Safety concerns No No        PSYCHOTROPIC DRUG INTERACTIONS   Propranolol and bupropion: Plasma concentrations and pharmacologic effects of propranolol may be increased by strong CYP2D6 inhibitors (eg, Strong CYP2D6 Inhibitors).    MANAGEMENT:  refer for MTM  and use lowest therapeutic doses of propranolol    RISK STATEMENT for SAFETY   Viktoria did not appear to be an imminent safety risk to self or others.    TREATMENT  RISK STATEMENT:  The risks, benefits, alternatives and potential adverse effects have been discussed and are understood by the pt. The pt understands the risks of using street drugs or alcohol. There are no medical contraindications, the pt agrees to treatment with the ability to do so. The pt knows to call the clinic for any problems or to access emergency care if needed.  Medical and substance use concerns are documented above.  Psychotropic drug interaction check was done, including changes made today.    PROVIDER:  BENITO Coh CNP       MEDICAL DECISION MAKING        ([facility]     Level of Medical Decision Making:   - At least 1 chronic problem that is not stable  - Engaged in prescription drug management during visit (discussed any medication benefits, side effects, alternatives, etc.)

## 2023-11-01 ENCOUNTER — VIRTUAL VISIT (OUTPATIENT)
Dept: PHARMACY | Facility: CLINIC | Age: 38
End: 2023-11-01
Attending: PSYCHIATRY & NEUROLOGY
Payer: COMMERCIAL

## 2023-11-01 DIAGNOSIS — R10.13 DYSPEPSIA: ICD-10-CM

## 2023-11-01 DIAGNOSIS — K59.04 CHRONIC IDIOPATHIC CONSTIPATION: ICD-10-CM

## 2023-11-01 DIAGNOSIS — Z13.79 ENCOUNTER FOR PHARMACOGENETIC TESTING: Primary | ICD-10-CM

## 2023-11-01 DIAGNOSIS — G47.9 SLEEP DISORDER: ICD-10-CM

## 2023-11-01 DIAGNOSIS — F40.10 SOCIAL ANXIETY DISORDER: ICD-10-CM

## 2023-11-01 DIAGNOSIS — F33.9 MAJOR DEPRESSIVE DISORDER, RECURRENT EPISODE WITH ANXIOUS DISTRESS (H): ICD-10-CM

## 2023-11-01 PROCEDURE — 99605 MTMS BY PHARM NP 15 MIN: CPT | Mod: VID | Performed by: PHARMACIST

## 2023-11-01 PROCEDURE — 99607 MTMS BY PHARM ADDL 15 MIN: CPT | Mod: VID | Performed by: PHARMACIST

## 2023-11-01 RX ORDER — DIPHENHYDRAMINE HCL 25 MG
25 TABLET ORAL EVERY 6 HOURS PRN
COMMUNITY
End: 2023-11-03

## 2023-11-01 NOTE — Clinical Note
I see you have already started her on ramelteon. I would recommend trying a low dose antipsychotic if that is not helpful since they have been sedating for her in the past. I agree with stopping the gabapentin. Could consider discontinuing bupropion at some point because it does phenoconvert her to a CYP2D6 poor metabolizer which is increasing side effects of amitriptyline.   Please let me know what questions you have for me,  Javed Neves, Pharm. D., [facility] Medication Therapy Management Pharmacist Direct Voicemail: [phone]

## 2023-11-01 NOTE — PROGRESS NOTES
Medication Therapy Management (MTM) Encounter    ASSESSMENT:                            Medication Adherence/Access: No issues identified    Pharmacogenetic Assessment and Recommendations: Variability in CYP2D6, RRK1T42, CYP2C9 and other genes can impact the effectiveness and risk toxicity of certain medications used. Based on this patient's pharmacogenetic test results and clinical assessment, consider the following:     CYP2B6 Intermediate Metabolizer  a. Expected to have somewhat increased levels of medications metabolized by CYP2B6 (e.g., efavirenz) and may be at increased risk of side effects.    CYP2D6 Poor Metabolizer with bupropion - intermediate metabolizer without  a. Expected to have increased levels of medications metabolized by CYP2D6 (e.g., TCAs, atomoxetine, paroxetine, fluvoxamine, ondansetron ) and may be at increased risk of side effects. Expected to have decreased levels of medications activated by CYP2D6 (e.g., codeine, tramadol) and may be at risk for lack of efficacy.   Much higher risk of side effects with amitriptyline.       Depression/Anxiety/Insomnia    Since sleep has not been beneficial and gabapentin, benadryl, and lunesta not working she should try an alternative option such as ramelteon or a low dose antipsychotic since she has had sedation in the past with antipsychotics such as quetiapine. Abilify has a gene drug interaction with CYP2D6 PM.   Since bupropion hasn't been helpful and its phenoconverting her CYP2D6 to poor.     Constipation:  Stable.     Nausea:   Stable.     Heartburn:  Stable.     PLAN:                            Stop benadryl  Recommend ramelteon to help with sleep.   Stop gabapentin  Could consider discontinuing bupropion if it is not helpful  Could consider low dose antipshycotics to help with sleep if ramelteon is not helpful.    Follow-up: Return if symptoms worsen or fail to improve.    SUBJECTIVE/OBJECTIVE:                          Viktoria Guillen is a 38 year  "old female contacted via secure video for an initial visit. She was referred to me from Marixa OLIVER.      Reason for visit: PGxResults.    Allergies/ADRs: Reviewed in chart  Past Medical History: Reviewed in chart  Tobacco: She reports that she has never smoked. She has never been exposed to tobacco smoke. She has never used smokeless tobacco.  Alcohol: none    Medication Adherence/Access: no issues reported    Pharmacogenetic (PGx) Results: Pharmacogenetic testing was ordered for Viktoria STILES Mindy to assist in the treatment of depression/anxiety. See \"Pharmacogenomics Profile\" in lab result tab for complete list of pharmacogenetic results.         Drug interactions (as of 11/1/2023):    Interacting medication(s) Description of interaction Phenotype adjusted for drug interaction*   Bupropion CYP2D6 Strong Inhibitor CYP2D6 poor metabolizer     *Phenotype only when drug interaction is present. If the interacting medication is discontinued, the patient will convert back to their original predicted phenotype. For more information about drug interactions and a more inclusive drug interaction reference the [facility] Table.     Depression/Anxiety/Insomnia  Amitriptyline 50 mg once daily - does have some dry mouth that started with this  Bupropion 300mg once daily - has been on since March but doesn't think it is helping much.   Ketamine treatment - once a week - reports this is working really well  Lunesta 2 mg nightly- nothing has been helpful for this. Didn't fall asleep till 130 am. A little worse than normal but trying to fall asleep for 90 minutes is normal.   Gabapentin 300 mg for sleep and anxiety - also not helpful  Propranolol 10 mg twice a day - used to be helpful but recently not as much.  Benadryl 25 mg nightly - not helping   Has worked on sleep hygeiene. Sleep has gotten worse. Able to stay asleep when she gets to sleep.   Anxiety is better when depression is more controlled.         8/15/2023     8:34 AM " "9/15/2023     7:36 AM 10/19/2023     8:23 AM   PHQ-9 SCORE   PHQ-9 Total Score [facility] 22 (Severe depression) 21 (Severe depression) 5 (Mild depression)   PHQ-9 Total Score 22 21 5         2/16/2023    12:59 PM 8/15/2023     8:35 AM   LUBNA-7 SCORE   Total Score 9 (mild anxiety) 16 (severe anxiety)   Total Score 9 16       Past Medication Trials    Medications Max dose Dates/Duration Discontinuation Reason Other Notes   Citalopram   rash    Ambien   Not helpful as much but worked the best    Lexapro       Paxil       Zoloft       Prozac       Cymblta       Viibryd       Mirtazapine       Seroquel       Abilify       Trazodone   Didn't like how it made her feel    Xanax   Can't be a functional human\"    Clonazapam   Can't be a functional human\"    Ativan   \"Can't be a functional human\"    Buspirone   Caused paranoia    Melatonin   Not worked    Valerian Root       Ltheanine       Magnesium       Hydroxyzine       Belsomra                           Constipation:  Docusate 250 mg about once or twice a week.  No concerns or questions at this time.     Nausea:   Dicycloine 10 mg three times a day - helping. Ketamine has helped with this.  Metoclopramide 5 mg - hasn't taken since second ketamine session  Ondansetron 4 mg - only before ketamine sessions.   Simethicone 125 mg as needed - hasn't taken in the last month    Heartburn:  Pantopraazole 40 mg daily    Works well          Today's Vitals: There were no vitals taken for this visit.  ----------------      I spent 29 minutes with this patient today. I offer these suggestions for consideration by Marixa OLIVER. A copy of the visit note was provided to the patient's provider(s).    A summary of these recommendations was sent via [employer].    Javed Neves, Pharm. D., BCACP  Medication Therapy Management Pharmacist      Telemedicine Visit Details  Type of service:  Video Conference via [employer]  Start Time:  1106 am  End Time:  1135 am       Medication Therapy " Recommendations  Depression with anxiety    Current Medication: buPROPion (WELLBUTRIN XL) 300 MG 24 hr tablet   Rationale: Medication interaction - Adverse medication event - Safety   Recommendation: Discontinue Medication   Status: Contact Provider - Awaiting Response         Sleep disorder    Current Medication: diphenhydrAMINE (BENADRYL ALLERGY) 25 MG tablet (Discontinued)   Rationale: Undesirable effect - Adverse medication event - Safety   Recommendation: Discontinue Medication   Status: Accepted - no CPA Needed          Current Medication: eszopiclone (LUNESTA) 1 MG tablet (Discontinued)   Rationale: Synergistic therapy - Needs additional medication therapy - Indication   Recommendation: Start Medication - RAMELTEON   Status: Accepted per Provider          Current Medication: gabapentin (NEURONTIN) 300 MG capsule (Discontinued)   Rationale: Condition refractory to medication - Ineffective medication - Effectiveness   Recommendation: Discontinue Medication   Status: Accepted per Provider

## 2023-11-02 ENCOUNTER — MYC MEDICAL ADVICE (OUTPATIENT)
Dept: PSYCHIATRY | Facility: CLINIC | Age: 38
End: 2023-11-02
Payer: COMMERCIAL

## 2023-11-02 DIAGNOSIS — F51.05 INSOMNIA DUE TO OTHER MENTAL DISORDER: ICD-10-CM

## 2023-11-02 DIAGNOSIS — F99 INSOMNIA DUE TO OTHER MENTAL DISORDER: ICD-10-CM

## 2023-11-02 RX ORDER — ESZOPICLONE 3 MG/1
3 TABLET, FILM COATED ORAL PRN
Qty: 30 TABLET | Refills: 0 | Status: SHIPPED | OUTPATIENT
Start: 2023-11-02 | End: 2023-11-21

## 2023-11-02 RX ORDER — GABAPENTIN 300 MG/1
300 CAPSULE ORAL
Qty: 30 CAPSULE | Refills: 0 | Status: CANCELLED | OUTPATIENT
Start: 2023-11-02

## 2023-11-02 RX ORDER — RAMELTEON 8 MG/1
8 TABLET ORAL AT BEDTIME
Qty: 30 TABLET | Refills: 1 | Status: SHIPPED | OUTPATIENT
Start: 2023-11-02 | End: 2023-12-21

## 2023-11-02 NOTE — TELEPHONE ENCOUNTER
Called Viktoria to clarify which sleep medication she was requesting. Reviewed recommendations from yesterday's MTM. Viktoria reports gabapentin has not been helpful, so will discontinue that. Lunesta 3 mg has been working somewhat, so refilled that for the short term. Based on MTM recommendation, will also start ramelteon 8 mg nightly.     Discussed risks, benefits, potential side effects and how to take medication.     Goal is to use Lunesta in the short term, while targeting circadian rhythm disruption long term with ramelteon.     Viktoria to call with any follow-up questions.

## 2023-11-03 PROBLEM — Z13.79 ENCOUNTER FOR PHARMACOGENETIC TESTING: Status: ACTIVE | Noted: 2023-11-03

## 2023-11-03 NOTE — PATIENT INSTRUCTIONS
"Recommendations from today's MTM visit:                                                    MTM (medication therapy management) is a service provided by a clinical pharmacist designed to help you get the most of out of your medicines.   Today we reviewed what your medicines are for, how to know if they are working, that your medicines are safe and how to make your medicine regimen as easy as possible.    Stop benadryl  Recommend ramelteon to help with sleep.   Stop gabapentin  Could consider discontinuing bupropion if it is not helpful  Could consider low dose antipshycotics to help with sleep if ramelteon is not helpful.    Follow-up: Return if symptoms worsen or fail to improve.    It was great speaking with you today.  I value your experience and would be very thankful for your time in providing feedback in our clinic survey. In the next few days, you may receive an email or text message from [employer] with a link to a survey related to your  clinical pharmacist.\"     To schedule another MTM appointment, please call the clinic directly or you may call the MTM scheduling line at [phone] or toll-free at [phone].     My Clinical Pharmacist's contact information:                                                      Please feel free to contact me with any questions or concerns you have.      Javed Neves, Pharm. D., [facility]  Medication Therapy Management Pharmacist    "

## 2023-11-17 DIAGNOSIS — F33.9 MAJOR DEPRESSIVE DISORDER, RECURRENT EPISODE WITH ANXIOUS DISTRESS (H): ICD-10-CM

## 2023-11-17 RX ORDER — PROPRANOLOL HYDROCHLORIDE 10 MG/1
10 TABLET ORAL 2 TIMES DAILY
Qty: 60 TABLET | Refills: 0 | Status: SHIPPED | OUTPATIENT
Start: 2023-11-17 | End: 2023-12-21

## 2023-11-17 NOTE — TELEPHONE ENCOUNTER
Last Seen 10/19/23  RTC 4 weeks  Cancel 0  No-Show 0    Next Appt 11/21/23    Incoming Refill From [facility] via fax    Medication Requested   propranolol (INDERAL) 10 MG tablet     Directions   Route: Take 1 tablet (10 mg) by mouth 2 times daily - Oral     Qty 60    Last Refill 10/17/23    Medication Refill Pended Per Refill Protocol   Fluconazole Pregnancy And Lactation Text: This medication is Pregnancy Category C and it isn't know if it is safe during pregnancy. It is also excreted in breast milk.

## 2023-11-20 ASSESSMENT — PATIENT HEALTH QUESTIONNAIRE - PHQ9
SUM OF ALL RESPONSES TO PHQ QUESTIONS 1-9: 2
SUM OF ALL RESPONSES TO PHQ QUESTIONS 1-9: 2
10. IF YOU CHECKED OFF ANY PROBLEMS, HOW DIFFICULT HAVE THESE PROBLEMS MADE IT FOR YOU TO DO YOUR WORK, TAKE CARE OF THINGS AT HOME, OR GET ALONG WITH OTHER PEOPLE: NOT DIFFICULT AT ALL

## 2023-11-21 ENCOUNTER — VIRTUAL VISIT (OUTPATIENT)
Dept: PSYCHIATRY | Facility: CLINIC | Age: 38
End: 2023-11-21
Attending: PSYCHIATRY & NEUROLOGY
Payer: COMMERCIAL

## 2023-11-21 VITALS — BODY MASS INDEX: 27.46 KG/M2 | WEIGHT: 165 LBS

## 2023-11-21 DIAGNOSIS — F99 INSOMNIA DUE TO OTHER MENTAL DISORDER: ICD-10-CM

## 2023-11-21 DIAGNOSIS — F33.9 MAJOR DEPRESSIVE DISORDER, RECURRENT EPISODE WITH ANXIOUS DISTRESS (H): ICD-10-CM

## 2023-11-21 DIAGNOSIS — F51.05 INSOMNIA DUE TO OTHER MENTAL DISORDER: ICD-10-CM

## 2023-11-21 PROCEDURE — 99214 OFFICE O/P EST MOD 30 MIN: CPT | Mod: VID | Performed by: PSYCHIATRY & NEUROLOGY

## 2023-11-21 RX ORDER — AMITRIPTYLINE HYDROCHLORIDE 50 MG/1
50 TABLET ORAL AT BEDTIME
Qty: 30 TABLET | Refills: 3 | Status: SHIPPED | OUTPATIENT
Start: 2023-11-21 | End: 2024-02-13

## 2023-11-21 RX ORDER — BUPROPION HYDROCHLORIDE 150 MG/1
150 TABLET ORAL EVERY MORNING
Qty: 14 TABLET | Refills: 0 | Status: SHIPPED | OUTPATIENT
Start: 2023-11-21 | End: 2023-12-21

## 2023-11-21 RX ORDER — ESZOPICLONE 3 MG/1
3 TABLET, FILM COATED ORAL PRN
Qty: 30 TABLET | Refills: 1 | Status: SHIPPED | OUTPATIENT
Start: 2023-11-21 | End: 2023-12-21

## 2023-11-21 ASSESSMENT — PAIN SCALES - GENERAL: PAINLEVEL: NO PAIN (0)

## 2023-11-21 NOTE — PATIENT INSTRUCTIONS
PLAN                                                                                                       1) Meds    DECREASE Bupropion (Wellbutrin) XL to 150 mg daily in the morning for one week. If you are feeling better on the lower dose and wish to discontinue it, ok to stop after 7 days of 150 mg.     CONTINUE Propranolol 10 mg twice daily as needed for anxiety/panic (avoid taking if you feel lightheaded or dehydrated due to ongoing GI upset)    CONTINUE Amitriptyline 50 mg daily at bedtime for depression    CONTINUE Eszopiclone (Lunesta) 1 mg, with option to titrate up as needed to 3 mg.   CONTINUE Ramelteon (Rozerem) 3 mg     2) Other: Psychological Testing, referral sent. ADHD Packet will be sent to you.     3) RTC: In 4 weeks for 60 minutes > repeat diagnostic assessment.     4) Crisis numbers: For crisis resources, please see the information at the end of this document    Patient Education      Thank you for coming to the [facility] [place] [facility].     Lab Testing:  If you had lab testing today and your results are reassuring or normal they will be mailed to you or sent through [facility] within 7 days. If the lab tests need quick action we will call you with the results. The phone number we will call with results is # [phone]. If this is not the best number please call our clinic and change the number.     Medication Refills:  If you need any refills please call your pharmacy and they will contact us. Our fax number for refills is [phone].   Three business days of notice are needed for general medication refill requests.   Five business days of notice are needed for controlled substance refill requests.   If you need to change to a different pharmacy, please contact the new pharmacy directly. The new pharmacy will help you get your medications transferred.     Contact Us:  Please call [phone] during business hours (8-5:00 M-F).   If you have medication  related questions after clinic hours, or on the weekend, please call [phone].     Financial Assistance [phone]   Medical Records [phone]       MENTAL HEALTH CRISIS RESOURCES:  For a emergency help, please call 911 or go to the nearest Emergency Department.     Emergency Walk-In Options:   EmPATH Unit @ [facility] ([place]): [phone] - Specialized mental health emergency area designed to be calming  [facility] ([place]): [phone]  [facility] ([place]): [phone]  [facility] ([place]): [phone]    [facility] Crisis Information:   [place]: [phone]  [place]: [phone]  [place] ([place]) - Adult: [phone]     Child: [phone]  [place] - Adult: [phone]     Child: [phone]  [place]: [phone]  List of all [facility] resources:   https://[place].[facility]/dhs/people-we-serve/adults/health-care/mental-health/resources/crisis-contacts.jsp    National Crisis Information:   Crisis Text Line: Text  [place]  to 426153  Suicide & Crisis Lifeline: 988  National Suicide Prevention Lifeline: 9-632-951-TALK (1-615.406.1911)       For online chat options, visit https://suicidepreventionlifeline.org/chat/  Poison Control Center: 1-423.341.6323  Trans Lifeline: 1-979.204.7604 - Hotline for transgender people of all ages  The Pacheco Project: 7-123-863-2488 - Hotline for LGBT youth     For Non-Emergency Support:   Fast Tracker: Mental Health & Substance Use Disorder Resources -   https://www.[employer].org/

## 2023-11-21 NOTE — NURSING NOTE
Is the patient currently in the state of [place]? YES    Visit mode:VIDEO    If the visit is dropped, the patient can be reconnected by: VIDEO VISIT: Text to cell phone:   Telephone Information:   Mobile [phone]       Will anyone else be joining the visit? No  (If patient encounters technical issues they should call [phone])    How would you like to obtain your AVS? MyChart    Are changes needed to the allergy or medication list? Yes Ketamine IV treatment, one treatment every two weeks currently.    Rooming Documentation: Assigned questionnaire(s) completed .    Reason for visit: ROSEMARY Espinoza

## 2023-11-21 NOTE — PROGRESS NOTES
[facility]  Psychiatry Clinic  PSYCHIATRY PROGRESS NOTE     CARE TEAM:  PCP- No Ref-Primary, Physician   GI- at [facility] .  Viktoria is a 38 year old who prefers the name Viktoria and uses pronouns she, her.     DIAGNOSES                                                                                        Major Depressive Disorder with Anxious Distress  Insomnia    ASSESSMENT                                                                                          Viktoria Guillen is a 37 year old female with symptoms supporting a diagnosis of MAJOR DEPRESSIVE DISORDER with anxious distress. While she initially felt some improvement on Wellbutrin  mg, she subsequently experienced significant increase in depressed mood starting in June. She continues to experience anxiety and fatigue as well. Sleep has worsened, but nutrition has improved. Viktoria pursued a referral to [facility] for ketamine treatment for depression. She reports a significant response so far in terms of mood, anxiety, and physical GI distress symptoms. Insomnia continued to be an issue and possibly worsened since starting ketamine, but has since improved with the addition of ramelteon and eszopiclone. She has not noticed much improvement with gabapentin 300 mg for additional support for nighttime anxiety and insomnia, so discontinued this. Recently completed Prysm testing.    Would like to repeat her diagnostic assessment to understand remaining symptoms as the depression resolves.   Rule out OCD; ASD, ADHD.   Consider the need for psychological testing. -referral placed 11/21/23    Now that anxiety is more prevalent than depression, Viktoria wonders if decreasing Wellbutrin could be therapeutic. Collaboratively agreed to taper off Wellbutrin, continue with propranolol, amitriptyline, eszopiclone, and ramelteon. Goal of eventually discontinuing eszopiclone.     Of note, benzodiazepines appear to have some evidence suggesting  "they may reduce the duration of ketamine's antidepressant effect (Gisselle, et al, 2021). Therefore will avoid temazepam at this time.     [place] was checked today:  Indicates taking controlled medication as prescribed.    PLAN                                                                                                       1) Meds    DECREASE Bupropion (Wellbutrin) XL to 150 mg daily in the morning for one week. If you are feeling better on the lower dose and wish to discontinue it, ok to stop after 7 days of 150 mg.   CONTINUE Propranolol 10 mg twice daily as needed for anxiety/panic (avoid taking if you feel lightheaded or dehydrated due to ongoing GI upset)  CONTINUE Amitriptyline 50 mg daily at bedtime for depression    CONTINUE Eszopiclone (Lunesta) 1 mg, with option to titrate up as needed to 3 mg.   CONTINUE Ramelteon (Rozerem) 3 mg     2) Other: Psychological Testing, referral sent.  ADHD Packet sent.    3) RTC: In 4 weeks for 60 minutes > repeat diagnostic assessment.     4) Crisis numbers in AVS.     CHIEF COMPLAINT                                      \" Things are much better since starting ketamine infusions \"     PERTINENT BACKGROUND                                         [initial eval 02/16/23]     Viktoria Guillen is a 37 year old female with a significant history of depressive episodes starting at age 14. She reports many past medication trials, as well as a long history of working with a psychotherapist. She has had several episodes of suicidal ideation over the years, with the most significant occurring in 2015. At that time, she had no attempts or concrete plan, but had daily intrusive SI thoughts. In 2013 she had one episode of self-injury that involved cutting her wrist with a steak knife. She reports many episodes of depression over the past 23 years. She also reports a history of anxiety.     Psych pertinent item history includes SIB     HISTORY OF PRESENT ILLNESS                              " "                        Since the last visit, Viktoria reports she is feeling really good.   She notes more difficulty concentrating on things that she doesn't want to be engaging in.   Simultaneously, she is getting hyper focused on other things such as a new game she started playing.   Has used stimulants at a few different times in the past, and had suspicions of ADHD in the past, but is unsure if she's ever had a formal diagnosis of ADHD.   Was on modafinil throughout most of college for hypersomnolence and a questionable \"delayed sleep phase disorder.\"   Describes mood as \"my depression symptoms are pretty much gone.\" I'm still struggling with some anxiety things. Working on this in therapy.   Describes some rumination. Therapist wondered if this was obsessive thinking.   Sleep has improved since starting eszopiclone. Taking 3 mg on week nights and 1.5 mg on the weekends. Around 6629-4019 (shuts electronics down, takes medications, winds down), gets into bed around 2130 (take eszopiclone and   Falls asleep within 20-45 minutes. Sleeps through the night with the exception of a quick bathroom trip. Wakes for the day between 4011-2255. Daytime energy feels adequate.   Last Saturday night she tried just taking ramelteon and skipping eszopiclone. After 90 minutes she gave up and took eszopiclone, then fell asleep within 20 minutes.     Long term plan with [facility]: ketamine infusions once every two weeks for a few more weeks. Then will reevaluate.     Denies SI.     Recent Symptoms:   Depression:  insomnia-see HPI for additional details  Anxiety:  excessive worry and nervous/overwhelmed- see HPI for details  Panic Attack:  peaks in < several  mins, occurs 2x per week, triggers are known, palpitations, diaphoresis and tremors - none since last visit, see HPI for details     Adverse Med Effects:  See table below  Pertinent Negatives:  No   Recent Substance Use:    None reported    SOCIAL and FAMILY HISTORY               " "                                  per pt report         Family Hx:  Not obtained at this visit.     Social Hx:  Social Support - good social support from girlfriend \"great relationship\"    PAST PSYCH and SUBSTANCE USE HISTORY                      Psych:  Suicidal ideation - Past, none currently   SIB - One prior episode 2013, none currently      Substance Use:  No additional substance use history.    MEDICAL HISTORY     Patient Active Problem List   Diagnosis    Depression with anxiety    Chronic idiopathic constipation    ADD (attention deficit disorder)    Dyspepsia    Essential hypertension    Gastroparesis    GERD (gastroesophageal reflux disease)    Insomnia    Sleep disorder    Social anxiety disorder    Spastic colon    Suicidal ideation    Depression, unspecified depression type    Encounter for pharmacogenetic testing     Has been physically ill since October. Unintentional 20 pound weight loss since October. GI specialist through [facility].     [place] Provider: Alexandra (not PCP).    ALLERGIES: Ginger and Citalopram     MEDICAL REVIEW OF SYSTEMS                                                                  none in addition to that documented above    CURRENT MEDS       Current Outpatient Medications   Medication Sig Dispense Refill    amitriptyline (ELAVIL) 50 MG tablet Take 1 tablet (50 mg) by mouth at bedtime 30 tablet 3    buPROPion (WELLBUTRIN XL) 150 MG 24 hr tablet Take 1 tablet (150 mg) by mouth every morning 14 tablet 0    eszopiclone (LUNESTA) 3 MG tablet Take 1 tablet (3 mg) by mouth as needed for sleep For use while you are waiting for ramelteon to start working. 30 tablet 1    dicyclomine (BENTYL) 10 MG capsule Take 10 mg by mouth 3 times daily as needed (with meals)      docusate sodium (DSS) 250 MG capsule Take 250 mg by mouth daily      metoclopramide (REGLAN) 5 MG tablet 5 mg 2 times daily as needed (Nausea)      ondansetron (ZOFRAN ODT) 4 MG ODT tab Take 4 mg by mouth every 6 hours " as needed for nausea      pantoprazole (PROTONIX) 40 MG EC tablet Take 40 mg by mouth daily      propranolol (INDERAL) 10 MG tablet Take 1 tablet (10 mg) by mouth 2 times daily 60 tablet 0    ramelteon (ROZEREM) 8 MG tablet Take 1 tablet (8 mg) by mouth at bedtime Take every night at bedtime. Continue taking even if you don't notice it helping after the first few days. It works better over time. Do not take with a high fat meal. 30 tablet 1    simethicone (MYLICON) 125 MG chewable tablet Take 125 mg by mouth 4 times daily as needed for intestinal gas          Psychotropic Medication Trials      Medication Max Dose (mg) Dates / Duration Helpful? DC Reason / Adverse Effects?   Zoloft (sertraline) Unknown Unknown No None   Paxil (paroxetine) Unknown Unknown No Cause SI at the age of 14   Celexa (citalopram) Unknown Unknown No Full body rash/hives in patient's 20s    Lexapro (escitalopram) Unknown Unknown No None   Wellbutrin  (bupropion) 100mg IR Unknown Yes None   Remeron (mirtazapine) Unknown Unknown Yes Weight gain of 30 pounds in one month   Viibryd (vilazodone) Unknown Unknown Yes Reported long withdrawal    Elavil (amitriptyline) 25-50mg Unknown Yes Somewhat helpful at 25mg; described a strange sensation of mental fog/slowing, but not sedation at 50mg   Abilify (aripriprazole) Unknown Unknown No Reports sedation     Neurontin (gabapentin) 300mg Unknown Unsure Unsure   Inderal (propranolol) 10mg Unknown Yes None   Ambien (zolpidem) 10mg Unknown Yes None   Melatonin Unknown Unknown No None   Xanax (alprazolam) Unknown Unknown Yes None   Vistaril / Atarax (hydroxyzine) 50mg Unknown No Used for sleep, cause some drowsiness, but not enough for sleep onset       VITALS                                                                                              Wt 74.8 kg (165 lb)   BMI 27.46 kg/m     MENTAL STATUS EXAM                                                             Alertness: alert   Appearance:  adequately groomed  Behavior/Demeanor: cooperative, with good  eye contact   Speech: regular rate and rhythm  Language: intact  Psychomotor: normal or unremarkable  Mood: anxious and description consistent with euthymia  Affect: full range; congruent to: mood- yes, content- yes  Thought Process/Associations: unremarkable  Thought Content:  Reports none;  Denies suicidal & violent ideation and delusions  Perception:  Reports none;  Denies hallucinations  Insight: adequate  Judgment: appropriate  Cognition: does  appear grossly intact; formal cognitive testing was not done  oriented: time, person, and place  attention span: intact  concentration: intact  recent memory: intact  remote memory: intact  fund of knowledge: appropriate  Gait and Station: N/A (telehealth)    LABS and DATA         9/15/2023     7:36 AM 10/19/2023     8:23 AM 11/20/2023     5:16 PM   PHQ   PHQ-9 Total Score 21 5 2   Q9: Thoughts of better off dead/self-harm past 2 weeks More than half the days Not at all Not at all   F/U: Thoughts of suicide or self-harm Yes     F/U: Self harm-plan Yes     F/U: Self-harm action No     F/U: Safety concerns No         PSYCHOTROPIC DRUG INTERACTIONS   Propranolol and bupropion: Plasma concentrations and pharmacologic effects of propranolol may be increased by strong CYP2D6 inhibitors (eg, Strong CYP2D6 Inhibitors).    MANAGEMENT:  refer for MTM  and use lowest therapeutic doses of propranolol    RISK STATEMENT for SAFETY   Viktoria did not appear to be an imminent safety risk to self or others.    TREATMENT RISK STATEMENT:  The risks, benefits, alternatives and potential adverse effects have been discussed and are understood by the pt. The pt understands the risks of using street drugs or alcohol. There are no medical contraindications, the pt agrees to treatment with the ability to do so. The pt knows to call the clinic for any problems or to access emergency care if needed.  Medical and substance use concerns are  documented above.  Psychotropic drug interaction check was done, including changes made today.    PROVIDER:  BENITO Cho CNP       MEDICAL DECISION MAKING        ([facility] .PSYCHBILLMDM)     Level of Medical Decision Making:   - At least 1 chronic problem that is not stable  - Engaged in prescription drug management during visit (discussed any medication benefits, side effects, alternatives, etc.)

## 2023-11-21 NOTE — PROGRESS NOTES
Virtual Visit Details    Type of service:  Video Visit     Originating Location (pt. Location): Home    Distant Location (provider location):  On-site  Platform used for Video Visit: Shereen

## 2023-12-20 DIAGNOSIS — F33.9 MAJOR DEPRESSIVE DISORDER, RECURRENT EPISODE WITH ANXIOUS DISTRESS (H): ICD-10-CM

## 2023-12-21 ENCOUNTER — VIRTUAL VISIT (OUTPATIENT)
Dept: PSYCHIATRY | Facility: CLINIC | Age: 38
End: 2023-12-21
Attending: PSYCHIATRY & NEUROLOGY
Payer: COMMERCIAL

## 2023-12-21 DIAGNOSIS — F33.9 MAJOR DEPRESSIVE DISORDER, RECURRENT EPISODE WITH ANXIOUS DISTRESS (H): Primary | ICD-10-CM

## 2023-12-21 DIAGNOSIS — F51.05 INSOMNIA DUE TO OTHER MENTAL DISORDER: ICD-10-CM

## 2023-12-21 DIAGNOSIS — F99 INSOMNIA DUE TO OTHER MENTAL DISORDER: ICD-10-CM

## 2023-12-21 PROCEDURE — 99214 OFFICE O/P EST MOD 30 MIN: CPT | Mod: VID | Performed by: PSYCHIATRY & NEUROLOGY

## 2023-12-21 RX ORDER — ESZOPICLONE 3 MG/1
3 TABLET, FILM COATED ORAL PRN
Qty: 30 TABLET | Refills: 1 | Status: SHIPPED | OUTPATIENT
Start: 2023-12-21 | End: 2024-02-13

## 2023-12-21 RX ORDER — RAMELTEON 8 MG/1
8 TABLET ORAL AT BEDTIME
Qty: 30 TABLET | Refills: 1 | Status: SHIPPED | OUTPATIENT
Start: 2023-12-21 | End: 2024-02-13

## 2023-12-21 RX ORDER — CLONIDINE HYDROCHLORIDE 0.1 MG/1
0.1 TABLET ORAL AT BEDTIME
Qty: 30 TABLET | Refills: 1 | Status: SHIPPED | OUTPATIENT
Start: 2023-12-21 | End: 2024-02-13

## 2023-12-21 ASSESSMENT — PATIENT HEALTH QUESTIONNAIRE - PHQ9
SUM OF ALL RESPONSES TO PHQ QUESTIONS 1-9: 5
SUM OF ALL RESPONSES TO PHQ QUESTIONS 1-9: 5
10. IF YOU CHECKED OFF ANY PROBLEMS, HOW DIFFICULT HAVE THESE PROBLEMS MADE IT FOR YOU TO DO YOUR WORK, TAKE CARE OF THINGS AT HOME, OR GET ALONG WITH OTHER PEOPLE: SOMEWHAT DIFFICULT

## 2023-12-21 ASSESSMENT — PAIN SCALES - GENERAL: PAINLEVEL: NO PAIN (0)

## 2023-12-21 NOTE — NURSING NOTE
Is the patient currently in the state of [place]? YES    Visit mode:VIDEO    If the visit is dropped, the patient can be reconnected by: VIDEO VISIT: Text to cell phone:   Telephone Information:   Mobile [phone]       Will anyone else be joining the visit? NO  (If patient encounters technical issues they should call [phone])    How would you like to obtain your AVS? MyChart    Are changes needed to the allergy or medication list? Yes pt no longer takes bupropion 150 mg. Please remove from med list.    Reason for visit: RECHECK    Patient will complete questionnaires prior to joining video.    Mindi RILEY

## 2023-12-21 NOTE — PATIENT INSTRUCTIONS
**For crisis resources, please see the information at the end of this document**   Patient Education    Thank you for coming to the [facility] [place] [facility].     Lab Testing:  If you had lab testing today and your results are reassuring or normal they will be mailed to you or sent through [employer] within 7 days. If the lab tests need quick action we will call you with the results. The phone number we will call with results is # [phone]. If this is not the best number please call our clinic and change the number.     Medication Refills:  If you need any refills please call your pharmacy and they will contact us. Our fax number for refills is [phone].   Three business days of notice are needed for general medication refill requests.   Five business days of notice are needed for controlled substance refill requests.   If you need to change to a different pharmacy, please contact the new pharmacy directly. The new pharmacy will help you get your medications transferred.     Contact Us:  Please call [phone] during business hours (8-5:00 M-F).   If you have medication related questions after clinic hours, or on the weekend, please call [phone].     Financial Assistance [phone]   Medical Records [phone]       MENTAL HEALTH CRISIS RESOURCES:  For a emergency help, please call 911 or go to the nearest Emergency Department.     Emergency Walk-In Options:   EmPATH Unit @ [facility] ([place]): [phone] - Specialized mental health emergency area designed to be calming  [facility] ([place]): [phone]  [facility] ([place]): [phone]  [facility]): [phone]    [facility] Crisis Information:   [place]: [phone]  [place]: [phone]  [place] ([place]) - Adult: [phone]     Child: [phone]  [place] - Adult: [phone]     Child: [phone]  [place]:  [phone]  List of all [facility] resources:   https://[place].gov/dhs/people-we-serve/adults/health-care/mental-health/resources/crisis-contacts.jsp    National Crisis Information:   Crisis Text Line: Text  [place]  to 115941  Suicide & Crisis Lifeline: 988  National Suicide Prevention Lifeline: 9-334-399-TALK (1-686.948.8368)       For online chat options, visit https://suicidepreventionlifeline.org/chat/  Poison Control Center: 1-630-166-8369  Trans Lifeline: 5-629-880-1917 - Hotline for transgender people of all ages  The Pacheco Project: 2-661-692-3269 - Hotline for LGBT youth     For Non-Emergency Support:   Fast Tracker: Mental Health & Substance Use Disorder Resources -   https://www.[employer]ayan.org/

## 2023-12-21 NOTE — PROGRESS NOTES
Virtual Visit Details    Type of service:  Video Visit     Originating Location (pt. Location): Home    Distant Location (provider location):  Off-site  Platform used for Video Visit: [facility]  Psychiatry Clinic  PSYCHIATRY PROGRESS NOTE     CARE TEAM:  PCP- No Ref-Primary, Physician   [place] [facility] [place]  Viktoria is a 38 year old who prefers the name Viktoria and uses pronouns she, her.     DIAGNOSES                                                                                        Major Depressive Disorder with Anxious Distress  Insomnia    ASSESSMENT                                                                                          Viktoria Guillen is a 37 year old female with symptoms supporting a diagnosis of MAJOR DEPRESSIVE DISORDER with anxious distress. While she initially felt some improvement on Wellbutrin  mg, she subsequently experienced significant increase in depressed mood starting in June. She continues to experience anxiety and fatigue as well. Sleep has worsened, but nutrition has improved. Viktoria pursued a referral to [facility] for ketamine treatment for depression. She reports a significant response so far in terms of mood and physical GI distress symptoms. Anxiety and insomnia continued to be an issue and possibly worsened since starting ketamine, but has since improved with the addition of ramelteon and eszopiclone. She has not noticed much improvement with gabapentin 300 mg for additional support for nighttime anxiety and insomnia, so discontinued this. Recently completed [employer] testing.    Would like to repeat her diagnostic assessment to understand remaining symptoms as the depression resolves.   Rule out OCD; ASD, ADHD.   Consider the need for psychological testing. -referral placed 11/21/23 -scheduled with Larry Castillo 1/19/24    Now that anxiety is more prevalent than depression, collaboratively agreed to taper off Wellbutrin.  "Discontinuing Wellbutrin was helpful for sleep, neutral on mood and anxiety.     Viktoria feels propranolol is no longer as helpful as it used to be. Will discontinue and switch to clonidine 0.1 mg at bedtime. Continue amitriptyline, eszopiclone, and ramelteon. Goal of eventually discontinuing eszopiclone. Viktoria is currently using it infrequently, but appreciates having it available for difficult nights and use after maintenance ketamine infusions.     Of note, benzodiazepines appear to have some evidence suggesting they may reduce the duration of ketamine's antidepressant effect (Gisselle, et al, 2021). Therefore will avoid temazepam at this time.     [place]NPMP was checked today:  Indicates taking controlled medication as prescribed.    PLAN                                                                                                       1) Meds    DISCONTINUE Propranolol 10 mg twice daily as needed for anxiety/panic   CONTINUE Amitriptyline 50 mg daily at bedtime for depression    START Clonidine 0.1 mg at bedtime     CONTINUE Eszopiclone (Lunesta)  3 mg  CONTINUE Ramelteon (Rozerem) 3 mg     2) Other: Psychological Testing 1/19  3) RTC: In 8 weeks   4) Crisis numbers in AVS.     CHIEF COMPLAINT                                      \" Following up \"     PERTINENT BACKGROUND                                         [initial eval 02/16/23]     Viktoria Guillen is a 37 year old female with a significant history of depressive episodes starting at age 14. She reports many past medication trials, as well as a long history of working with a psychotherapist. She has had several episodes of suicidal ideation over the years, with the most significant occurring in 2015. At that time, she had no attempts or concrete plan, but had daily intrusive SI thoughts. In 2013 she had one episode of self-injury that involved cutting her wrist with a steak knife. She reports many episodes of depression over the past 23 years. She also reports a " "history of anxiety.     Psych pertinent item history includes SIB     HISTORY OF PRESENT ILLNESS                                                      Since the last visit, Viktoria reports things are going well.   Going to bed between 2100 and 2130. Taking Delta 8 gummy around 2000. Falls asleep about 45 minutes after going to bed. Denies difficulty with sleep maintenance. Wakes around 0600 or 0630. Feels sleep has overall improved since starting Ramelteon. Just completed Q2 week ketamine treatments. [facility]. Will likely go to a once a month infusion schedule. At this last ketamine infusion, noted impaired sleep for just one night following ketamine.     Mood = good, denies depressive symptoms  Anxiety = remains a struggle, having difficulty relaxing, frustrated with doom scrolling habit   ADHD = difficulty staying on tasks, difficulty engaging in one activity at a time.     Notes her ADHD/anxiety struggle shows up more in unstructured time. More frustration in home life. Does well on work days that are more structured. 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 Having difficulty keeping up with tasks such as paying bills, renewing 's license.     Since stopping bupropion, did not notices changes in mood or ADHD symptoms. Unclear if stopping bupropion impacted anxiety, but stopping it did improve sleep.     \"I've come to the realization that all of my problems are not tied to depression, and that is frustrating.\"    Continues to work with therapist regularly.     Denies SI since ketamine started.     Recent Symptoms:   Depression:  insomnia-improving, see HPI for additional details  Anxiety:  excessive worry and nervous/overwhelmed- see HPI for details  Panic Attack:  peaks in < several  mins, occurs 2x per week, triggers are known, palpitations, diaphoresis and tremors - none since last visit, see HPI for details " "    Adverse Med Effects:  See table below  Pertinent Negatives:  No   Recent Substance Use:    None reported    SOCIAL and FAMILY HISTORY                                                 per pt report         Family Hx:  Not obtained at this visit.     Social Hx:  Social Support - good social support from girlfriend \"great relationship\"    PAST PSYCH and SUBSTANCE USE HISTORY                      Psych:  Suicidal ideation - Past, none currently   SIB - One prior episode 2013, none currently      Substance Use:  No additional substance use history.    MEDICAL HISTORY     Patient Active Problem List   Diagnosis    Depression with anxiety    Chronic idiopathic constipation    ADD (attention deficit disorder)    Dyspepsia    Essential hypertension    Gastroparesis    GERD (gastroesophageal reflux disease)    Insomnia    Sleep disorder    Social anxiety disorder    Spastic colon    Suicidal ideation    Depression, unspecified depression type    Encounter for pharmacogenetic testing     Has been physically ill since October. Unintentional 20 pound weight loss since October. GI specialist through [facility] Provider: Alexandra (not PCP).    ALLERGIES: Ginger and Citalopram     MEDICAL REVIEW OF SYSTEMS                                                                  none in addition to that documented above    CURRENT MEDS       Current Outpatient Medications   Medication Sig Dispense Refill    amitriptyline (ELAVIL) 50 MG tablet Take 1 tablet (50 mg) by mouth at bedtime 30 tablet 3    cloNIDine (CATAPRES) 0.1 MG tablet Take 1 tablet (0.1 mg) by mouth at bedtime 30 tablet 1    dicyclomine (BENTYL) 10 MG capsule Take 10 mg by mouth 3 times daily as needed (with meals)      docusate sodium (DSS) 250 MG capsule Take 250 mg by mouth daily as needed      eszopiclone (LUNESTA) 3 MG tablet Take 1 tablet (3 mg) by mouth as needed for sleep For use while you are waiting for ramelteon to start working. 30 tablet 1    " metoclopramide (REGLAN) 5 MG tablet 5 mg 2 times daily as needed (Nausea)      ondansetron (ZOFRAN ODT) 4 MG ODT tab Take 4 mg by mouth every 6 hours as needed for nausea      pantoprazole (PROTONIX) 40 MG EC tablet Take 40 mg by mouth daily      ramelteon (ROZEREM) 8 MG tablet Take 1 tablet (8 mg) by mouth at bedtime Take every night at bedtime. Continue taking even if you don't notice it helping after the first few days. It works better over time. Do not take with a high fat meal. 30 tablet 1    simethicone (MYLICON) 125 MG chewable tablet Take 125 mg by mouth 4 times daily as needed for intestinal gas          Psychotropic Medication Trials      Medication Max Dose (mg) Dates / Duration Helpful? DC Reason / Adverse Effects?   Zoloft (sertraline) Unknown Unknown No None   Paxil (paroxetine) Unknown Unknown No Cause SI at the age of 14   Celexa (citalopram) Unknown Unknown No Full body rash/hives in patient's 20s    Lexapro (escitalopram) Unknown Unknown No None   Wellbutrin  (bupropion) 100mg IR Unknown Yes None   Remeron (mirtazapine) Unknown Unknown Yes Weight gain of 30 pounds in one month   Viibryd (vilazodone) Unknown Unknown Yes Reported long withdrawal    Elavil (amitriptyline) 25-50mg Unknown Yes Somewhat helpful at 25mg; described a strange sensation of mental fog/slowing, but not sedation at 50mg   Abilify (aripriprazole) Unknown Unknown No Reports sedation     Neurontin (gabapentin) 300mg Unknown Unsure Unsure   Inderal (propranolol) 10mg Unknown Yes None   Ambien (zolpidem) 10mg Unknown Yes None   Melatonin Unknown Unknown No None   Xanax (alprazolam) Unknown Unknown Yes None   Vistaril / Atarax (hydroxyzine) 50mg Unknown No Used for sleep, cause some drowsiness, but not enough for sleep onset       VITALS                                                                                              There were no vitals taken for this visit.   MENTAL STATUS EXAM                                                              Alertness: alert   Appearance: adequately groomed  Behavior/Demeanor: cooperative, with good  eye contact   Speech: regular rate and rhythm  Language: intact  Psychomotor: normal or unremarkable  Mood: anxious and description consistent with euthymia  Affect: full range; congruent to: mood- yes, content- yes  Thought Process/Associations: unremarkable  Thought Content:  Reports none;  Denies suicidal & violent ideation and delusions  Perception:  Reports none;  Denies hallucinations  Insight: adequate  Judgment: appropriate  Cognition: does  appear grossly intact; formal cognitive testing was not done  oriented: time, person, and place  attention span: intact  concentration: intact  recent memory: intact  remote memory: intact  fund of knowledge: appropriate  [facility]    LABS and DATA         10/19/2023     8:23 AM 11/20/2023     5:16 PM 12/21/2023     7:47 AM   PHQ   PHQ-9 Total Score 5 2 5   Q9: Thoughts of better off dead/self-harm past 2 weeks Not at all Not at all Not at all       PSYCHOTROPIC DRUG INTERACTIONS   Drug-Drug: cloNIDine and amitriptylineThe antihypertensive effects of Clonidine may be decreased by Tricyclic Antidepressants. Tricyclic Antidepressants may worsen rebound reactions from abrupt Clonidine withdrawal.    MANAGEMENT:  routine monitoring    RISK STATEMENT for SAFETY   Viktoria did not appear to be an imminent safety risk to self or others.    TREATMENT RISK STATEMENT:  The risks, benefits, alternatives and potential adverse effects have been discussed and are understood by the pt. The pt understands the risks of using street drugs or alcohol. There are no medical contraindications, the pt agrees to treatment with the ability to do so. The pt knows to call the clinic for any problems or to access emergency care if needed.  Medical and substance use concerns are documented above.  Psychotropic drug interaction check was done, including changes made  today.    PROVIDER:  BENITO Cho CNP       MEDICAL DECISION MAKING        (Daniel .PSYCHBILLMSLIME)     Level of Medical Decision Making:   - At least 1 chronic problem that is not stable  - Engaged in prescription drug management during visit (discussed any medication benefits, side effects, alternatives, etc.)      Answers submitted by the patient for this visit:  Patient Health Questionnaire (Submitted on 12/21/2023)  If you checked off any problems, how difficult have these problems made it for you to do your work, take care of things at home, or get along with other people?: Somewhat difficult  PHQ9 TOTAL SCORE: 5

## 2023-12-22 RX ORDER — PROPRANOLOL HYDROCHLORIDE 10 MG/1
10 TABLET ORAL 2 TIMES DAILY
Qty: 60 TABLET | Refills: 0 | OUTPATIENT
Start: 2023-12-22

## 2024-01-18 ASSESSMENT — PATIENT HEALTH QUESTIONNAIRE - PHQ9: SUM OF ALL RESPONSES TO PHQ QUESTIONS 1-9: 6

## 2024-01-19 ENCOUNTER — VIRTUAL VISIT (OUTPATIENT)
Dept: PSYCHIATRY | Facility: CLINIC | Age: 39
End: 2024-01-19
Attending: PSYCHOLOGIST
Payer: COMMERCIAL

## 2024-01-19 DIAGNOSIS — F41.8 DEPRESSION WITH ANXIETY: Primary | ICD-10-CM

## 2024-01-19 PROCEDURE — 99207 PR INCOMPL DIAG INTERV-PSYCH TEST: CPT | Mod: 95

## 2024-01-19 ASSESSMENT — PATIENT HEALTH QUESTIONNAIRE - PHQ9
SUM OF ALL RESPONSES TO PHQ QUESTIONS 1-9: 6
10. IF YOU CHECKED OFF ANY PROBLEMS, HOW DIFFICULT HAVE THESE PROBLEMS MADE IT FOR YOU TO DO YOUR WORK, TAKE CARE OF THINGS AT HOME, OR GET ALONG WITH OTHER PEOPLE: VERY DIFFICULT
SUM OF ALL RESPONSES TO PHQ QUESTIONS 1-9: 6
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
## 2024-02-06 ENCOUNTER — APPOINTMENT (OUTPATIENT)
Dept: PSYCHIATRY | Facility: CLINIC | Age: 39
End: 2024-02-06
Attending: PSYCHOLOGIST
Payer: COMMERCIAL

## 2024-02-06 ASSESSMENT — PATIENT HEALTH QUESTIONNAIRE - PHQ9
10. IF YOU CHECKED OFF ANY PROBLEMS, HOW DIFFICULT HAVE THESE PROBLEMS MADE IT FOR YOU TO DO YOUR WORK, TAKE CARE OF THINGS AT HOME, OR GET ALONG WITH OTHER PEOPLE: VERY DIFFICULT
SUM OF ALL RESPONSES TO PHQ QUESTIONS 1-9: 6
SUM OF ALL RESPONSES TO PHQ QUESTIONS 1-9: 6

## 2024-02-13 ENCOUNTER — VIRTUAL VISIT (OUTPATIENT)
Dept: PSYCHIATRY | Facility: CLINIC | Age: 39
End: 2024-02-13
Attending: PSYCHIATRY & NEUROLOGY
Payer: COMMERCIAL

## 2024-02-13 DIAGNOSIS — F33.9 MAJOR DEPRESSIVE DISORDER, RECURRENT EPISODE WITH ANXIOUS DISTRESS (H): ICD-10-CM

## 2024-02-13 DIAGNOSIS — F51.05 INSOMNIA DUE TO OTHER MENTAL DISORDER: ICD-10-CM

## 2024-02-13 DIAGNOSIS — F99 INSOMNIA DUE TO OTHER MENTAL DISORDER: ICD-10-CM

## 2024-02-13 PROCEDURE — 99214 OFFICE O/P EST MOD 30 MIN: CPT | Mod: 95 | Performed by: PSYCHIATRY & NEUROLOGY

## 2024-02-13 RX ORDER — CLONIDINE HYDROCHLORIDE 0.1 MG/1
0.1 TABLET ORAL AT BEDTIME
Qty: 30 TABLET | Refills: 3 | Status: SHIPPED | OUTPATIENT
Start: 2024-02-13 | End: 2024-05-30

## 2024-02-13 RX ORDER — RAMELTEON 8 MG/1
8 TABLET ORAL AT BEDTIME
Qty: 30 TABLET | Refills: 3 | Status: SHIPPED | OUTPATIENT
Start: 2024-02-13 | End: 2024-05-30

## 2024-02-13 RX ORDER — AMITRIPTYLINE HYDROCHLORIDE 50 MG/1
50 TABLET ORAL AT BEDTIME
Qty: 30 TABLET | Refills: 3 | Status: SHIPPED | OUTPATIENT
Start: 2024-02-13 | End: 2024-05-30

## 2024-02-13 RX ORDER — ESZOPICLONE 3 MG/1
3 TABLET, FILM COATED ORAL PRN
Qty: 30 TABLET | Refills: 3 | Status: SHIPPED | OUTPATIENT
Start: 2024-02-13 | End: 2024-05-30

## 2024-02-13 NOTE — NURSING NOTE
Is the patient currently in the state of [place]? YES    Visit mode:VIDEO    If the visit is dropped, the patient can be reconnected by: VIDEO VISIT: Text to cell phone:   Telephone Information:   Mobile [phone]       Will anyone else be joining the visit? NO  (If patient encounters technical issues they should call [phone])    How would you like to obtain your AVS? MyChart    Are changes needed to the allergy or medication list? Pt stated no changes to allergies and Pt stated no med changes    Reason for visit: RECHECK    Ericka Batres VVF        No Vitals taken

## 2024-02-13 NOTE — PROGRESS NOTES
[facility]  Psychiatry Clinic  PSYCHIATRY PROGRESS NOTE     CARE TEAM:  PCP- No Ref-Primary, Physician   GI- at [facility] [place] .  Viktoria is a 38 year old who prefers the name Viktoria and uses pronouns she, her.     DIAGNOSES                                                                                        Major Depressive Disorder with Anxious Distress  Insomnia    ASSESSMENT                                                                                          Viktoria Guillen is a 37 year old female with symptoms supporting a diagnosis of MAJOR DEPRESSIVE DISORDER with anxious distress. While she initially felt some improvement on Wellbutrin  mg, she subsequently experienced significant increase in depressed mood starting in June. She continues to experience anxiety and fatigue as well. Sleep has worsened, but nutrition has improved. Viktoria pursued a referral to [facility] for ketamine treatment for depression. She reports a significant response so far in terms of mood and physical GI distress symptoms. Anxiety and insomnia continued to be an issue and possibly worsened since starting ketamine, but has since improved with the addition of ramelteon and eszopiclone. She has not noticed much improvement with gabapentin 300 mg for additional support for nighttime anxiety and insomnia, so discontinued this. Recently completed [employer] testing.    Would like to repeat her diagnostic assessment to understand remaining symptoms as the depression resolves.   Rule out OCD; ASD, ADHD.   Consider the need for psychological testing. -referral placed 11/21/23 -scheduled with Larry Castillo 1/19/24    Now that anxiety is more prevalent than depression, collaboratively agreed to taper off Wellbutrin. Discontinuing Wellbutrin was helpful for sleep, neutral on mood and anxiety.     Viktoria feels propranolol is no longer as helpful as it used to be. Will discontinue and switch to clonidine 0.1 mg at  "bedtime. Continue amitriptyline, eszopiclone, and ramelteon. Goal of eventually discontinuing eszopiclone. Viktoria is currently using it infrequently, but appreciates having it available for difficult nights and use after maintenance ketamine infusions.     Of note, benzodiazepines appear to have some evidence suggesting they may reduce the duration of ketamine's antidepressant effect (Gisselle, et al, 2021). Therefore will avoid temazepam at this time.     MNPMP was checked today:  Indicates taking controlled medication as prescribed.    PLAN                                                                                                       1) Meds    CONTINUE Amitriptyline 50 mg daily at bedtime for depression    CONTINUE  Clonidine 0.1 mg at bedtime     CONTINUE Eszopiclone (Lunesta)  3 mg  CONTINUE Ramelteon (Rozerem) 8 mg     STOP Melatonin 10 mg    2) Other: None today.  3) RTC: In 2 weeks   4) Crisis numbers in AVS.     CHIEF COMPLAINT                                      \" Following up \"     PERTINENT BACKGROUND                                         [initial eval 02/16/23]     Viktoria Guillen is a 37 year old female with a significant history of depressive episodes starting at age 14. She reports many past medication trials, as well as a long history of working with a psychotherapist. She has had several episodes of suicidal ideation over the years, with the most significant occurring in 2015. At that time, she had no attempts or concrete plan, but had daily intrusive SI thoughts. In 2013 she had one episode of self-injury that involved cutting her wrist with a steak knife. She reports many episodes of depression over the past 23 years. She also reports a history of anxiety.     Psych pertinent item history includes SIB     HISTORY OF PRESENT ILLNESS                                                      Since the last visit, Viktoria reports things are going \"pretty good.\" She completed psychological testing since " "the last appointment. Processed how the testing was for her and some surprises she noted. Has not received test results yet.     Mood = denies depression    ADHD = struggling with distraction, difficulty \"getting started\" with a variety of things (new tasks, even things she enjoys). Notes distraction of her own thoughts. Endorses disruption both in her personal life and with efficiency at work. Difficulty completing tasks. Feeling \"scatter brained and all over the place.\"     Sleep = has been bad. Notes rumination / difficulty turning her brain off at night. Running through mental 'to do' list at night. Otherwise, sometimes analyzing random things at night. Lays down at 2100; takes amitriptyline, eszopiclone, ramelteon, clonidine, and melatonin 10 mg. Even with taking all of these, notes it takes between 1-2 hours to fall asleep. Falls asleep between 2200 and 2300. Sleeps through the night. Wakes for the day between [id number]. Feels exhausted 1-2 days per week. Notes 1-2 days per week when she feels well rested. Many mornings feels grogginess in her head. Denies increase in morning grogginess since starting clonidine. Also takes Delta 8 gummies most nights recently. Has been working hard on sleep hygiene lately. Stopped all screen time before bed. Stops caffeine by 0900.     Anxiety = feels frustrated with level of functioning, which leads to \"getting worked up.\" Overall, still feels anxiety is better than pre-ketamine baseline. When she struggles with getting up and getting going in the morning, this can lead to increased anxiety. Notes that remaining anxiety feels very tied to ADHD symptoms. Denies panic attacks, but notes she occasionally gets so worked up, she needs to take a break to recover.     Continues monthly ketamine infusions at [facility]. Ketamine still negatively impacts sleep, though not as significantly as it was in the beginning.     Continues to work with therapist regularly.     Denies SI since " "ketamine started.       ADHD Assessment    (1) INATTENTION (5 or more of the following have persisted for at least 6 months to a degree that it is maladaptive and inconsistent with the developmental level)    a. Yes often fails to give close attention to details or makes careless mistakes in school, work, or other activities.  b No often has difficulty sustaining attention in tasks or play activities.  c. No often does not seem to listen when spoken to directly.  d. Yes often does not follow through on instructions and fails to finish schoolwork, chores, or duties in the work place (not due to oppositional behavior or failure to understand instructions)  e. Yes often has difficulty organizing tasks or activities  f.  Yes often avoids, dislikes, or is reluctant to engage in tasks that require sustained mental effort (such as school work or homework.)   g. No often loses things necessary for  tasks or activities (eg toys, school assignments, pencils, books, or tools.)  h. Yes is often easily distracted by extraneous stimuli  i. Yes is often forgetful in daily activities.    (2) HYPERACTIVITY-IMPULSIVITY (5 or more of the following symptoms for at least 6 months to a maladaptive and developmentally inconsistent level)  Hyperactivity  a. Yes often fidgets with hands or feet or squirms in seat.  b. No often leaves seat in classroom or other situations in which remaining seated is expected.  c. No often runs about or climbs excessively in situations in which it is inappropriate (in adolescents or adults may be limited to feelings of restlessness)  d. No often has difficulty playing or engaging in leisure activities quietly.  e.  No often is \"on the go\" or is \"driven by a motor\".  f. No often talks excessively  Impulsivity  g. No  often blurts out answers before questions have been completed  h. No often has difficulty awaiting turn  i. No often interrupts or intrudes on others (e.g. butts into conversations or games)   " "    (B) Some hyperactive/impulsive or inattentive symptoms that caused impairment were present before age 13 years Yes    (C) Some impairment from the symptoms is present in 2 or more settings (school,  Work, home.)  Yes    (D)  Clear Evidence of clinically significant impairment in social, academic or occupational functioning.Yes    (E) Symptoms are not due to PDD, Anxiety Disorder  unclear      Was on Modafinil in college due to excessive daytime fatigue (possibly related to depression.) In hindsight, notes she was functioning much better during that time.     Adverse Med Effects:  See table below  Pertinent Negatives:  No   Recent Substance Use:    None reported    SOCIAL and FAMILY HISTORY                                                 per pt report         Family Hx:  Not obtained at this visit.     Social Hx:  Social Support - good social support from girlfriend \"great relationship\"    PAST PSYCH and SUBSTANCE USE HISTORY                      Psych:  Suicidal ideation - Past, none currently   SIB - One prior episode 2013, none currently      Substance Use:  No additional substance use history.    MEDICAL HISTORY     Patient Active Problem List   Diagnosis    Depression with anxiety    Chronic idiopathic constipation    ADD (attention deficit disorder)    Dyspepsia    Essential hypertension    Gastroparesis    GERD (gastroesophageal reflux disease)    Insomnia    Sleep disorder    Social anxiety disorder    Spastic colon    Suicidal ideation    Depression, unspecified depression type    Encounter for pharmacogenetic testing     Has been physically ill since October. Unintentional 20 pound weight loss since October. GI specialist through [facility].     [place] Provider: Alexandra (not PCP).    ALLERGIES: Razia and Citalopram     MEDICAL REVIEW OF SYSTEMS                                                                  none in addition to that documented above    CURRENT MEDS       Current Outpatient " Medications   Medication Sig Dispense Refill    amitriptyline (ELAVIL) 50 MG tablet Take 1 tablet (50 mg) by mouth at bedtime 30 tablet 3    cloNIDine (CATAPRES) 0.1 MG tablet Take 1 tablet (0.1 mg) by mouth at bedtime 30 tablet 1    dicyclomine (BENTYL) 10 MG capsule Take 10 mg by mouth 3 times daily as needed (with meals)      docusate sodium (DSS) 250 MG capsule Take 250 mg by mouth daily as needed      eszopiclone (LUNESTA) 3 MG tablet Take 1 tablet (3 mg) by mouth as needed for sleep For use while you are waiting for ramelteon to start working. 30 tablet 1    metoclopramide (REGLAN) 5 MG tablet 5 mg 2 times daily as needed (Nausea)      ondansetron (ZOFRAN ODT) 4 MG ODT tab Take 4 mg by mouth every 6 hours as needed for nausea      pantoprazole (PROTONIX) 40 MG EC tablet Take 40 mg by mouth daily      ramelteon (ROZEREM) 8 MG tablet Take 1 tablet (8 mg) by mouth at bedtime Take every night at bedtime. Continue taking even if you don't notice it helping after the first few days. It works better over time. Do not take with a high fat meal. 30 tablet 1    simethicone (MYLICON) 125 MG chewable tablet Take 125 mg by mouth 4 times daily as needed for intestinal gas          Psychotropic Medication Trials      Medication Max Dose (mg) Dates / Duration Helpful? DC Reason / Adverse Effects?   Zoloft (sertraline) Unknown Unknown No None   Paxil (paroxetine) Unknown Unknown No Cause SI at the age of 14   Celexa (citalopram) Unknown Unknown No Full body rash/hives in patient's 20s    Lexapro (escitalopram) Unknown Unknown No None   Wellbutrin  (bupropion) 100mg IR Unknown Yes None   Remeron (mirtazapine) Unknown Unknown Yes Weight gain of 30 pounds in one month   Viibryd (vilazodone) Unknown Unknown Yes Reported long withdrawal    Elavil (amitriptyline) 25-50mg Unknown Yes Somewhat helpful at 25mg; described a strange sensation of mental fog/slowing, but not sedation at 50mg   Abilify (aripriprazole) Unknown Unknown No  Reports sedation     Neurontin (gabapentin) 300mg Unknown Unsure Unsure   Inderal (propranolol) 10mg Unknown Yes None   Ambien (zolpidem) 10mg Unknown Yes None   Melatonin Unknown Unknown No None   Xanax (alprazolam) Unknown Unknown Yes None   Vistaril / Atarax (hydroxyzine) 50mg Unknown No Used for sleep, cause some drowsiness, but not enough for sleep onset       VITALS                                                                                              There were no vitals taken for this visit.   MENTAL STATUS EXAM                                                             Alertness: alert   Appearance: adequately groomed  Behavior/Demeanor: cooperative, with good  eye contact   Speech: regular rate and rhythm  Language: intact  Psychomotor: normal or unremarkable  Mood: anxious and description consistent with euthymia  Affect: full range; congruent to: mood- yes, content- yes  Thought Process/Associations: unremarkable  Thought Content:  Reports none;  Denies suicidal & violent ideation and delusions  Perception:  Reports none;  Denies hallucinations  Insight: adequate  Judgment: appropriate  Cognition: does  appear grossly intact; formal cognitive testing was not done  oriented: time, person, and place  attention span: intact  concentration: intact  recent memory: intact  remote memory: intact  fund of knowledge: appropriate  Gait and Station: N/A ([facility])    LABS and DATA         1/18/2024     9:09 PM 1/19/2024    10:49 AM 2/6/2024     7:58 AM   PHQ   PHQ-9 Total Score 6 6 6   Q9: Thoughts of better off dead/self-harm past 2 weeks Not at all Not at all Not at all       PSYCHOTROPIC DRUG INTERACTIONS   Drug-Drug: cloNIDine and amitriptylineThe antihypertensive effects of Clonidine may be decreased by Tricyclic Antidepressants. Tricyclic Antidepressants may worsen rebound reactions from abrupt Clonidine withdrawal.    MANAGEMENT:  routine monitoring    RISK STATEMENT for SAFETY   Viktoria did not appear  to be an imminent safety risk to self or others.    TREATMENT RISK STATEMENT:  The risks, benefits, alternatives and potential adverse effects have been discussed and are understood by the pt. The pt understands the risks of using street drugs or alcohol. There are no medical contraindications, the pt agrees to treatment with the ability to do so. The pt knows to call the clinic for any problems or to access emergency care if needed.  Medical and substance use concerns are documented above.  Psychotropic drug interaction check was done, including changes made today.    PROVIDER:  BENITO Cho CNP       MEDICAL DECISION MAKING        ([facility])     Level of Medical Decision Making:   - At least 1 chronic problem that is not stable  - Engaged in prescription drug management during visit (discussed any medication benefits, side effects, alternatives, etc.)

## 2024-02-13 NOTE — PATIENT INSTRUCTIONS
PLAN                                                                                                       1) Meds    CONTINUE Amitriptyline 50 mg daily at bedtime for depression    CONTINUE  Clonidine 0.1 mg at bedtime     CONTINUE Eszopiclone (Lunesta)  3 mg  CONTINUE Ramelteon (Rozerem) 8 mg     STOP Melatonin 10 mg    2) Other: None today.    3) RTC: In 2 weeks     4) Crisis numbers    **For crisis resources, please see the information at the end of this document**     Patient Education    Thank you for coming to the [facility] [place] [facility].     Lab Testing:  If you had lab testing today and your results are reassuring or normal they will be mailed to you or sent through [employer] within 7 days. If the lab tests need quick action we will call you with the results. The phone number we will call with results is # [phone]. If this is not the best number please call our clinic and change the number.     Medication Refills:  If you need any refills please call your pharmacy and they will contact us. Our fax number for refills is [phone].   Three business days of notice are needed for general medication refill requests.   Five business days of notice are needed for controlled substance refill requests.   If you need to change to a different pharmacy, please contact the new pharmacy directly. The new pharmacy will help you get your medications transferred.     Contact Us:  Please call [phone] during business hours (8-5:00 M-F).   If you have medication related questions after clinic hours, or on the weekend, please call [phone].     Financial Assistance [phone]   Medical Records [phone]       MENTAL HEALTH CRISIS RESOURCES:  For a emergency help, please call 911 or go to the nearest Emergency Department.     Emergency Walk-In Options:   Em[facility] Unit @ [facility] ([place]): [phone] - Specialized mental health emergency area designed to be  [facility] ([place]): [phone]  [facility] ([place]): [phone]  [facility] ([place]): [phone]    County Crisis Information:   [place]: [phone]  [place]: [phone]  [place] ([place]) - Adult: [phone]     Child: [phone]  [place] - Adult: [phone]     Child: [phone]  [place]: [phone]  List of all [facility] resources:   https://[place].gov/dhs/people-we-serve/adults/health-care/mental-health/resources/crisis-contacts.jsp    National Crisis Information:   Crisis Text Line: Text  [place]  to 790519  Suicide & Crisis Lifeline: 988  National Suicide Prevention Lifeline: 6-806-123-TALK (1-539.633.3417)       For online chat options, visit https://suicidepreventionlifeline.org/chat/  Poison Control Center: 1-163.400.9437  Trans Lifeline: 1-886.885.7313 - Hotline for transgender people of all ages  The [employer] Project: 3-797-841-1201 - Hotline for LGBT youth     For Non-Emergency Support:   Fast Tracker: Mental Health & Substance Use Disorder Resources -   https://www.[employer].org/

## 2024-02-22 ENCOUNTER — VIRTUAL VISIT (OUTPATIENT)
Dept: PSYCHIATRY | Facility: CLINIC | Age: 39
End: 2024-02-22
Attending: PSYCHIATRY & NEUROLOGY
Payer: COMMERCIAL

## 2024-02-22 ENCOUNTER — MYC MEDICAL ADVICE (OUTPATIENT)
Dept: PSYCHIATRY | Facility: CLINIC | Age: 39
End: 2024-02-22
Payer: COMMERCIAL

## 2024-02-22 DIAGNOSIS — F90.0 ATTENTION DEFICIT HYPERACTIVITY DISORDER (ADHD), PREDOMINANTLY INATTENTIVE TYPE: ICD-10-CM

## 2024-02-22 DIAGNOSIS — F90.0 ATTENTION DEFICIT HYPERACTIVITY DISORDER (ADHD), PREDOMINANTLY INATTENTIVE TYPE: Primary | ICD-10-CM

## 2024-02-22 DIAGNOSIS — F33.9 MAJOR DEPRESSIVE DISORDER, RECURRENT EPISODE WITH ANXIOUS DISTRESS (H): Primary | ICD-10-CM

## 2024-02-22 PROCEDURE — 99214 OFFICE O/P EST MOD 30 MIN: CPT | Mod: 95 | Performed by: PSYCHIATRY & NEUROLOGY

## 2024-02-22 RX ORDER — METHYLPHENIDATE HYDROCHLORIDE EXTENDED RELEASE 10 MG/1
10 TABLET ORAL EVERY MORNING
Qty: 30 TABLET | Refills: 0 | Status: SHIPPED | OUTPATIENT
Start: 2024-02-22 | End: 2024-02-23

## 2024-02-22 NOTE — PROGRESS NOTES
[facility]  Psychiatry Clinic  PSYCHIATRY PROGRESS NOTE     CARE TEAM:  PCP- No Ref-Primary, Physician   GI- at [facility] .  Viktoria is a 38 year old who prefers the name Viktoria and uses pronouns she, her.     DIAGNOSES                                                                                        Major Depressive Disorder with Anxious Distress  Insomnia    Rule out ADHD    ASSESSMENT                                                                                          Viktoria Guillen is a 38 year old with symptoms supporting a diagnosis of MAJOR DEPRESSIVE DISORDER with anxious distress. While she initially felt some improvement on Wellbutrin  mg, she subsequently experienced significant increase in depressed mood starting in June. She continues to experience anxiety and fatigue as well. Sleep has worsened, but nutrition has improved. Viktoria pursued a referral to [facility] for ketamine treatment for depression. She reports a significant response so far in terms of mood and physical GI distress symptoms. Anxiety and insomnia continued to be an issue and possibly worsened since starting ketamine, but has since improved with the addition of ramelteon and eszopiclone. She has not noticed much improvement with gabapentin 300 mg for additional support for nighttime anxiety and insomnia, so discontinued this. Tapered off Wellbutrin, which initially improved anxiety and insomnia. Recently completed [employer] testing.    Would like to repeat her diagnostic assessment to understand remaining symptoms as the depression resolves.   Rule out OCD; ASD, ADHD.   Currently undergoing psychological testing with Larry Castillo (Next appointment 3/21/24)    Viktoria felt propranolol was no longer as helpful as it used to be. Discontinued and switch to clonidine 0.1 mg at bedtime. Unclear how helpful clonidine has been. Continue amitriptyline, eszopiclone, and ramelteon. Goal of eventually  "discontinuing eszopiclone. Viktoria is currently using it infrequently, but appreciates having it available for difficult nights and use after maintenance ketamine infusions.     Of note, benzodiazepines appear to have some evidence suggesting they may reduce the duration of ketamine's antidepressant effect (Gisselle, et al, 2021). Therefore will avoid temazepam at this time.     Today, discussed stimulant treatment for ADHD. Viktoria meets six inattentive criteria for diagnosis. Clear evidence symptoms started around age 10. ADHD symptoms are impacting her professionally (to the degree that she is constantly fearful she will be fired/ boss has expressed this), also negatively impact her home life, relationship, and ability to manage responsibilities. Viktoria was on stimulant medication for fatigue during college, and felt this was the time in her life when her mental health was most stable. Also reports excelling in college while on stimulant medications.     [employer] test results predict no gene-drug interaction for dexmethylphenidate (Focalin) or methylphenidate (Ritalin, Concerta). Moderate gene-drug interaction is predicted for atomoxetine (Strattera).    [place] was [place] today:  Indicates taking controlled medication as prescribed.    PLAN                                                                                                       1) Meds    CONTINUE Amitriptyline 50 mg daily at bedtime for depression    CONTINUE  Clonidine 0.1 mg at bedtime     CONTINUE Eszopiclone (Lunesta)  3 mg  CONTINUE Ramelteon (Rozerem) 8 mg     START Metadate ER 10 mg daily in the morning    2) Other: None today.    3) RTC: In 4 weeks     4) Crisis numbers in AVS.     CHIEF COMPLAINT                                      \" Following up \"     PERTINENT BACKGROUND                                         [initial eval 02/16/23]     History of depressive episodes starting at age 14. She reports many past medication trials, as well as a long " "history of working with a psychotherapist. She has had several episodes of suicidal ideation over the years, with the most significant occurring in 2015. At that time, she had no attempts or concrete plan, but had daily intrusive SI thoughts. In 2013 she had one episode of self-injury that involved cutting her wrist with a steak knife. She reports many episodes of depression over the past 23 years. She also reports a history of anxiety.     Psych pertinent item history includes SIB     HISTORY OF PRESENT ILLNESS                                                      Since the last visit, Viktoria reports the last week has been \"really rough.\" Feeling very overwhelmed.   Reports lack of sleep and difficulty with executive functioning have become really anxiety producing.   Sleep = Getting ready for bed around 2100, falls asleep between 2786-6495, and wakes for the day at 0500. Sleeps until 0800 or 0900 on the weekend.   Does not feel well rested when she wakes up. Denies nightmares, restless legs, or significant snoring.  Reports low daytime energy.   Feels \"stressed out all the time.\" Becomes tearful describing her level of overwhelm.   Endorses some return of depressive symptoms.   Endorses increased stressors at work right not.     ADHD = struggling with distraction, difficulty \"getting started\" with a variety of things (new tasks, even things she enjoys). Notes distraction of her own thoughts. Endorses disruption both in her personal life and with efficiency at work. Difficulty completing tasks. Feeling \"scatter brained and all over the place.\"     Anxiety = feels frustrated with level of functioning, significant overwhelm. Denies panic attacks, but notes she occasionally gets so worked up, she needs to take a break to recover.     Continues monthly ketamine infusions at [facility]. Notes her last ketamine infusion was was January 17th. She reports she is due for an infusion, but does not have an appointment scheduled. " "    Continues to work with therapist regularly.     Denies SI.    Adverse Med Effects:  See table below  Pertinent Negatives:  No   Recent Substance Use:    None reported    SOCIAL and FAMILY HISTORY                                                 per pt report         Family Hx:  Not obtained at this visit.     Social Hx:  Social Support - good social support from girlfriend \"great relationship\"    PAST PSYCH and SUBSTANCE USE HISTORY                      Psych:  Suicidal ideation - Past, none currently   SIB - One prior episode 2013, none currently      Substance Use:  No additional substance use history.    MEDICAL HISTORY     Patient Active Problem List   Diagnosis    Depression with anxiety    Chronic idiopathic constipation    ADD (attention deficit disorder)    Dyspepsia    Essential hypertension    Gastroparesis    GERD (gastroesophageal reflux disease)    Insomnia    Sleep disorder    Social anxiety disorder    Spastic colon    Suicidal ideation    Depression, unspecified depression type    Encounter for pharmacogenetic testing       [place] Provider: Alexandra (not PCP).    ALLERGIES: Ginger and Citalopram     MEDICAL REVIEW OF SYSTEMS                                                                  none in addition to that documented above    CURRENT MEDS       Current Outpatient Medications   Medication Sig Dispense Refill    amitriptyline (ELAVIL) 50 MG tablet Take 1 tablet (50 mg) by mouth at bedtime 30 tablet 3    cloNIDine (CATAPRES) 0.1 MG tablet Take 1 tablet (0.1 mg) by mouth at bedtime 30 tablet 3    dicyclomine (BENTYL) 10 MG capsule Take 10 mg by mouth 3 times daily as needed (with meals)      docusate sodium (DSS) 250 MG capsule Take 250 mg by mouth daily as needed      eszopiclone (LUNESTA) 3 MG tablet Take 1 tablet (3 mg) by mouth as needed for sleep 30 tablet 3    methylphenidate (METADATE ER) 10 MG CR tablet Take 1 tablet (10 mg) by mouth every morning 30 tablet 0    metoclopramide (REGLAN) 5 " MG tablet 5 mg 2 times daily as needed (Nausea)      ondansetron (ZOFRAN ODT) 4 MG ODT tab Take 4 mg by mouth every 6 hours as needed for nausea      pantoprazole (PROTONIX) 40 MG EC tablet Take 40 mg by mouth daily      ramelteon (ROZEREM) 8 MG tablet Take 1 tablet (8 mg) by mouth at bedtime Do not take with a high fat meal. 30 tablet 3    simethicone (MYLICON) 125 MG chewable tablet Take 125 mg by mouth 4 times daily as needed for intestinal gas          Psychotropic Medication Trials      Medication Max Dose (mg) Dates / Duration Helpful? DC Reason / Adverse Effects?   Zoloft (sertraline) Unknown Unknown No None   Paxil (paroxetine) Unknown Unknown No Cause SI at the age of 14   Celexa (citalopram) Unknown Unknown No Full body rash/hives in patient's 20s    Lexapro (escitalopram) Unknown Unknown No None   Wellbutrin  (bupropion) 100mg IR Unknown Yes None   Remeron (mirtazapine) Unknown Unknown Yes Weight gain of 30 pounds in one month   Viibryd (vilazodone) Unknown Unknown Yes Reported long withdrawal    Elavil (amitriptyline) 25-50mg Unknown Yes Somewhat helpful at 25mg; described a strange sensation of mental fog/slowing, but not sedation at 50mg   Abilify (aripriprazole) Unknown Unknown No Reports sedation     Neurontin (gabapentin) 300mg Unknown Unsure Unsure   Inderal (propranolol) 10mg Unknown Yes None   Ambien (zolpidem) 10mg Unknown Yes None   Melatonin Unknown Unknown No None   Xanax (alprazolam) Unknown Unknown Yes None   Vistaril / Atarax (hydroxyzine) 50mg Unknown No Used for sleep, cause some drowsiness, but not enough for sleep onset       VITALS                                                                                              There were no vitals taken for this visit.   MENTAL STATUS EXAM                                                             Alertness: alert   Appearance: adequately groomed  Behavior/Demeanor: cooperative, with good  eye contact   Speech: regular rate and  rhythm  Language: intact  Psychomotor: normal or unremarkable  Mood: anxious  Affect: full range; congruent to: mood- yes, content- yes  Thought Process/Associations: unremarkable  Thought Content:  Reports none;  Denies suicidal & violent ideation and delusions  Perception:  Reports none;  Denies hallucinations  Insight: adequate  Judgment: appropriate  Cognition: does  appear grossly intact; formal cognitive testing was not done  oriented: time, person, and place  attention span: intact  concentration: intact  recent memory: intact  remote memory: intact  fund of knowledge: appropriate  Gait and Station: N/A (telehealth)    LABS and DATA         1/18/2024     9:09 PM 1/19/2024    10:49 AM 2/6/2024     7:58 AM   PHQ   PHQ-9 Total Score 6 6 6   Q9: Thoughts of better off dead/self-harm past 2 weeks Not at all Not at all Not at all       PSYCHOTROPIC DRUG INTERACTIONS   Drug-Drug: cloNIDine and amitriptylineThe antihypertensive effects of Clonidine may be decreased by Tricyclic Antidepressants. Tricyclic Antidepressants may worsen rebound reactions from abrupt Clonidine withdrawal.    MANAGEMENT:  routine monitoring    RISK STATEMENT for SAFETY   Viktoria did not appear to be an imminent safety risk to self or others.    TREATMENT RISK STATEMENT:  The risks, benefits, alternatives and potential adverse effects have been discussed and are understood by the pt. The pt understands the risks of using street drugs or alcohol. There are no medical contraindications, the pt agrees to treatment with the ability to do so. The pt knows to call the clinic for any problems or to access emergency care if needed.  Medical and substance use concerns are documented above.  Psychotropic drug interaction check was done, including changes made today.    PROVIDER:  BENITO Cho CNP       MEDICAL DECISION MAKING        ([facility]     Level of Medical Decision Making:   - At least 1 chronic problem that is not stable  -  Engaged in prescription drug management during visit (discussed any medication benefits, side effects, alternatives, etc.)

## 2024-02-22 NOTE — PATIENT INSTRUCTIONS
Rodrigo Blum,     Thank you for your My Chart Message letting me know you discussed starting a stimulant with your ketamine provider. I also spoke with Larry regarding the ongoing psychological testing. He confirmed that you have completed ADHD assessment and are working on ASD assessment. We will not have his final report until you finish all assessments. Given the diagnostic criteria you have met at our last two appointments and the degree to which the ADHD symptoms and general overwhelm are impacting your mental health, I think it makes sense to move forward with starting Metadate ER 10 mg today. Please let me know how this medication / dose work for you and if we need to make adjustments. I hope that you begin to experience some relief soon between this medication and your upcoming ketamine infusion.     Marixa Gambino Dr. Marixa Kay, DNP, APRN, PMHNP-BC  [facility]       PLAN                                                                                                       1) Meds    CONTINUE Amitriptyline 50 mg daily at bedtime for depression    CONTINUE  Clonidine 0.1 mg at bedtime     CONTINUE Eszopiclone (Lunesta)  3 mg  CONTINUE Ramelteon (Rozerem) 8 mg     START Metadate ER 10 mg daily in the morning    2) Other: None today.    3) RTC: In 4 weeks     4) Crisis numbers: **For crisis resources, please see the information at the end of this document**     Patient Education      Thank you for coming to the [facility] [place] [facility].     Lab Testing:  If you had lab testing today and your results are reassuring or normal they will be mailed to you or sent through [employer] within 7 days. If the lab tests need quick action we will call you with the results. The phone number we will call with results is # [phone]. If this is not the best number please call our clinic and change the number.     Medication Refills:  If you need any refills  please call your pharmacy and they will contact us. Our fax number for refills is [phone].   Three business days of notice are needed for general medication refill requests.   Five business days of notice are needed for controlled substance refill requests.   If you need to change to a different pharmacy, please contact the new pharmacy directly. The new pharmacy will help you get your medications transferred.     Contact Us:  Please call [phone] during business hours (8-5:00 M-F).   If you have medication related questions after clinic hours, or on the weekend, please call [phone].     Financial Assistance [phone]   Medical Records [phone]       MENTAL HEALTH CRISIS RESOURCES:  For a emergency help, please call 701 or go to the nearest Emergency Department.     Emergency Walk-In Options:   Em[facility] Unit @ [facility] ([place]): [phone] - Specialized mental health emergency area designed to be calming  [facility] ([place]): [phone]  [facility] ([place]): [phone]  [facility]): [phone]    [facility] Crisis Information:   [place]: [phone]  [place]: [phone]  [place] ([place]) - Adult: [phone]     Child: [phone]  [place] - Adult: [phone]     Child: [phone]  [place]: [phone]  List of all [facility] resources:   https://[place].gov/dhs/people-we-serve/adults/health-care/mental-health/resources/crisis-contacts.jsp    National Crisis Information:   Crisis Text Line: Text  [place]  to 088323  Suicide & Crisis Lifeline: 988  National Suicide Prevention Lifeline: 3-846-848-PNEH (1-673.393.3606)       For online chat options, visit https://suicidepreventionlifeline.org/chat/  Poison Control Center: 1-284.472.7002  Trans Lifeline: 1-595.653.1622 - Hotline for transgender people of all ages  [employer]: 1-204.751.2939 - Hotline for LGBT youth     For Non-Emergency Support:   Fast Tracker:  Mental Health & Substance Use Disorder Resources -   https://www.[employer].org/

## 2024-02-22 NOTE — NURSING NOTE
Is the patient currently in the state of [place]? YES    Visit mode:VIDEO    If the visit is dropped, the patient can be reconnected by: VIDEO VISIT: Text to cell phone:   Telephone Information:   Mobile [phone]       Will anyone else be joining the visit? NO  (If patient encounters technical issues they should call [phone])    How would you like to obtain your AVS? MyChart    Are changes needed to the allergy or medication list? No    Reason for visit: No chief complaint on file.    Trista MICHELLEF

## 2024-02-23 ENCOUNTER — TELEPHONE (OUTPATIENT)
Dept: PSYCHIATRY | Facility: CLINIC | Age: 39
End: 2024-02-23
Payer: COMMERCIAL

## 2024-02-23 DIAGNOSIS — F90.0 ATTENTION DEFICIT HYPERACTIVITY DISORDER (ADHD), PREDOMINANTLY INATTENTIVE TYPE: ICD-10-CM

## 2024-02-23 RX ORDER — METHYLPHENIDATE HYDROCHLORIDE 10 MG/1
10 CAPSULE, EXTENDED RELEASE ORAL EVERY MORNING
Qty: 30 CAPSULE | Refills: 0 | Status: SHIPPED | OUTPATIENT
Start: 2024-02-23 | End: 2024-04-22

## 2024-02-23 RX ORDER — METHYLPHENIDATE HYDROCHLORIDE EXTENDED RELEASE 10 MG/1
10 TABLET ORAL EVERY MORNING
Qty: 30 TABLET | Refills: 0 | Status: SHIPPED | OUTPATIENT
Start: 2024-02-23 | End: 2024-02-23

## 2024-02-23 NOTE — TELEPHONE ENCOUNTER
[facility] Call Center    Phone Message    May a detailed message be left on voicemail: yes     Reason for Call:   Viktoria asked that the prescription get re-sent for Capsules and not Tablets. The pharmacy does not have Tablets and only Capsules for her medication.     She stated that the pharmacy will close at 5pm and are not open over the weekend. She's requesting for a call back to follow up.    Action Taken: Message routed to:  Other: Marixa Ramsey    Travel Screening: Not Applicable

## 2024-02-23 NOTE — TELEPHONE ENCOUNTER
Prescription for methylphenidate (Metadate ER) 10 mg pended for new pharmacy and routed to Marixa Pomona for approval.

## 2024-02-23 NOTE — TELEPHONE ENCOUNTER
[facility] Call Center    Phone Message    May a detailed message be left on voicemail:      Reason for Call: Medication Refill Request    Has the patient contacted the pharmacy for the refill? Yes   Name of medication being requested: methylphenidate (METADATE ER) 10 MG CR tablet changed to capsules  Provider who prescribed the medication: Marixa Kay  Pharmacy: [facility] [address] [address]   Date medication is needed: asap     Patient is concerned that the pharmacy listed above will run out of her medications. She has been calling around to other pharmacies and the  pharmacy in [place] was the only location that had the medication on stock but for capsules instead of tablets. She said that she is a bit paranoid and would like for message to be marked urgent and requested for a call back from care team to follow up on the refills.    Action Taken: Message routed to:  Other: psychiatry nurse riley    Travel Screening: Not Applicable

## 2024-03-21 ENCOUNTER — OFFICE VISIT (OUTPATIENT)
Dept: PSYCHIATRY | Facility: CLINIC | Age: 39
End: 2024-03-21
Attending: PSYCHOLOGIST
Payer: COMMERCIAL

## 2024-03-21 DIAGNOSIS — F33.1 MAJOR DEPRESSIVE DISORDER, RECURRENT EPISODE, MODERATE (H): Primary | ICD-10-CM

## 2024-03-21 PROCEDURE — 96130 PSYCL TST EVAL PHYS/QHP 1ST: CPT | Mod: HN

## 2024-03-21 PROCEDURE — 90791 PSYCH DIAGNOSTIC EVALUATION: CPT | Mod: HN

## 2024-03-21 PROCEDURE — 96131 PSYCL TST EVAL PHYS/QHP EA: CPT | Mod: HN

## 2024-03-21 PROCEDURE — 96136 PSYCL/NRPSYC TST PHY/QHP 1ST: CPT | Mod: HN

## 2024-03-21 PROCEDURE — 96137 PSYCL/NRPSYC TST PHY/QHP EA: CPT | Mod: HN

## 2024-03-21 ASSESSMENT — PATIENT HEALTH QUESTIONNAIRE - PHQ9
10. IF YOU CHECKED OFF ANY PROBLEMS, HOW DIFFICULT HAVE THESE PROBLEMS MADE IT FOR YOU TO DO YOUR WORK, TAKE CARE OF THINGS AT HOME, OR GET ALONG WITH OTHER PEOPLE: NOT DIFFICULT AT ALL
SUM OF ALL RESPONSES TO PHQ QUESTIONS 1-9: 3
SUM OF ALL RESPONSES TO PHQ QUESTIONS 1-9: 3

## 2024-03-22 ENCOUNTER — MYC REFILL (OUTPATIENT)
Dept: PSYCHIATRY | Facility: CLINIC | Age: 39
End: 2024-03-22
Payer: COMMERCIAL

## 2024-03-22 DIAGNOSIS — F90.0 ATTENTION DEFICIT HYPERACTIVITY DISORDER (ADHD), PREDOMINANTLY INATTENTIVE TYPE: ICD-10-CM

## 2024-03-22 RX ORDER — METHYLPHENIDATE HYDROCHLORIDE 10 MG/1
10 CAPSULE, EXTENDED RELEASE ORAL EVERY MORNING
Qty: 30 CAPSULE | Refills: 0 | Status: CANCELLED | OUTPATIENT
Start: 2024-03-22

## 2024-03-22 NOTE — TELEPHONE ENCOUNTER
Last seen: 2/22/24  RTC: 4 weeks  Cancel: none  No-show: none  Next appt: 3/28/24     Incoming refill from Patient via [employer]    Medication requested:   Pending Prescriptions:                       Disp   Refills    methylphenidate (METADATE CD) 10 MG CR ca*30 cap*0            Sig: Take 1 capsule (10 mg) by mouth every morning        Last refill per       From chart note:   START Metadate ER 10 mg daily in the morning     Medication unable to be refilled by RN due to criteria not met as indicated.                 []Eligibility - not seen in the last year              []Supervision - no future appointment              []Compliance - no shows, cancellations or lapse in therapy              []Verification - order discrepancy              [x]Controlled medication              []Medication not included in policy              []90-day supply request              []Other:

## 2024-03-25 RX ORDER — METHYLPHENIDATE HYDROCHLORIDE 10 MG/1
10 CAPSULE, EXTENDED RELEASE ORAL DAILY
Qty: 30 CAPSULE | Refills: 0 | Status: SHIPPED | OUTPATIENT
Start: 2024-03-25 | End: 2024-04-22

## 2024-03-25 RX ORDER — METHYLPHENIDATE HYDROCHLORIDE 10 MG/1
10 CAPSULE, EXTENDED RELEASE ORAL DAILY
Qty: 30 CAPSULE | Refills: 0 | Status: SHIPPED | OUTPATIENT
Start: 2024-04-25 | End: 2024-04-22

## 2024-03-25 RX ORDER — METHYLPHENIDATE HYDROCHLORIDE 10 MG/1
10 CAPSULE, EXTENDED RELEASE ORAL DAILY
Qty: 30 CAPSULE | Refills: 0 | Status: SHIPPED | OUTPATIENT
Start: 2024-05-26 | End: 2024-04-22

## 2024-03-28 ENCOUNTER — VIRTUAL VISIT (OUTPATIENT)
Dept: PSYCHIATRY | Facility: CLINIC | Age: 39
End: 2024-03-28
Attending: PSYCHIATRY & NEUROLOGY
Payer: COMMERCIAL

## 2024-03-28 VITALS — BODY MASS INDEX: 29.95 KG/M2 | WEIGHT: 180 LBS

## 2024-03-28 DIAGNOSIS — F90.0 ATTENTION DEFICIT HYPERACTIVITY DISORDER (ADHD), PREDOMINANTLY INATTENTIVE TYPE: Primary | ICD-10-CM

## 2024-03-28 DIAGNOSIS — F33.9 MAJOR DEPRESSIVE DISORDER, RECURRENT EPISODE WITH ANXIOUS DISTRESS (H): ICD-10-CM

## 2024-03-28 DIAGNOSIS — F51.05 INSOMNIA DUE TO OTHER MENTAL DISORDER: ICD-10-CM

## 2024-03-28 DIAGNOSIS — F99 INSOMNIA DUE TO OTHER MENTAL DISORDER: ICD-10-CM

## 2024-03-28 PROCEDURE — 99214 OFFICE O/P EST MOD 30 MIN: CPT | Mod: 95 | Performed by: PSYCHIATRY & NEUROLOGY

## 2024-03-28 ASSESSMENT — PAIN SCALES - GENERAL: PAINLEVEL: NO PAIN (0)

## 2024-03-28 NOTE — NURSING NOTE
Is the patient currently in the state of [place]? YES    Visit mode:VIDEO    If the visit is dropped, the patient can be reconnected by: VIDEO VISIT: Text to cell phone:   Telephone Information:   Mobile [phone]       Will anyone else be joining the visit? NO  (If patient encounters technical issues they should call [phone])    How would you like to obtain your AVS? MyChart    Are changes needed to the allergy or medication list? No  Patient denies any changes since echeck-in regarding medication and allergies and states all information entered during echeck-in remains accurate.    Reason for visit: RECHANGELA RILEY

## 2024-03-28 NOTE — PROGRESS NOTES
Virtual Visit Details    Type of service:  Video Visit     Originating Location (pt. Location): Home    Distant Location (provider location):  Off-site  Platform used for Video Visit: Shereen

## 2024-03-28 NOTE — PROGRESS NOTES
[facility]  Psychiatry Clinic  PSYCHIATRY PROGRESS NOTE     CARE TEAM:  PCP- No Ref-Primary, Physician   GI- at [facility] .  Viktoria is a 38 year old who prefers the name Viktoria and uses pronouns she, her.     DIAGNOSES                                                                                        Major Depressive Disorder with Anxious Distress  Insomnia    Rule out ADHD    ASSESSMENT                                                                                          Viktoria Guillen is a 38 year old with symptoms supporting a diagnosis of MAJOR DEPRESSIVE DISORDER with anxious distress. While she initially felt some improvement on Wellbutrin  mg, she subsequently experienced significant increase in depressed mood starting in June. She continues to experience anxiety and fatigue as well. Sleep has worsened, but nutrition has improved. Viktoria pursued a referral to [facility] for ketamine treatment for depression. She reports a significant response so far in terms of mood and physical GI distress symptoms. Anxiety and insomnia continued to be an issue and possibly worsened since starting ketamine, but has since improved with the addition of ramelteon and eszopiclone. She has not noticed much improvement with gabapentin 300 mg for additional support for nighttime anxiety and insomnia, so discontinued this. Tapered off Wellbutrin, which initially improved anxiety and insomnia. Recently completed Reebonz testing.    Currently undergoing psychological testing with Larry Castillo   Rule out OCD; ASD, ADHD.     Viktoria felt propranolol was no longer as helpful as it used to be. Discontinued and switch to clonidine 0.1 mg at bedtime. Unclear how helpful clonidine has been. Continue amitriptyline, eszopiclone, and ramelteon. Goal of eventually discontinuing eszopiclone. Of note, benzodiazepines appear to have some evidence suggesting they may reduce the duration of ketamine's  "antidepressant effect (Gisselle et al, 2021). Therefore will avoid benzos at this time.     At the last visit, started stimulant treatment for ADHD. Viktoria meets six inattentive criteria for diagnosis. Clear evidence symptoms started around age 10. ADHD symptoms are impacting her professionally (to the degree that she is constantly fearful she will be fired/ boss has expressed this), also negatively impact her home life, relationship, and ability to manage responsibilities. Viktoria was on stimulant medication for fatigue during college, and felt this was the time in her life when her mental health was most stable. Also reports excelling in college while on stimulant medications.     Portal Profes test results predict no gene-drug interaction for dexmethylphenidate (Focalin) or methylphenidate (Ritalin, Concerta). Moderate gene-drug interaction is predicted for atomoxetine (Strattera).    Started Metadate CD 10 mg at the end of February 2024. Viktoria reports this has been very helpful for managing ADHD symptoms. Anxiety initially worse, but then leveled out. Sleep and mood have improved since starting Metadate.   Viktoria wonders if a higher dose would work better, but decided to be cautious with dose increasing to balance anxiety and insomnia. No changes today. Check back in 4 weeks to assess the need for an increase dose.     MNPMP was checked today:  Indicates taking controlled medication as prescribed.    PLAN                                                                                                       1) Meds    CONTINUE Amitriptyline 50 mg daily at bedtime for depression    CONTINUE  Clonidine 0.1 mg at bedtime     CONTINUE Eszopiclone (Lunesta)  3 mg  CONTINUE Ramelteon (Rozerem) 8 mg     CONTINUE Metadate CD 10 mg daily in the morning    2) Other: None today.    3) RTC: In 4 weeks     4) Crisis numbers in AVS.     CHIEF COMPLAINT                                      \" Following up \"     PERTINENT BACKGROUND            "                              [initial eval 02/16/23]     History of depressive episodes starting at age 14. She reports many past medication trials, as well as a long history of working with a psychotherapist. She has had several episodes of suicidal ideation over the years, with the most significant occurring in 2015. At that time, she had no attempts or concrete plan, but had daily intrusive SI thoughts. In 2013 she had one episode of self-injury that involved cutting her wrist with a steak knife. She reports many episodes of depression over the past 23 years. She also reports a history of anxiety.     Psych pertinent item history includes SIB     HISTORY OF PRESENT ILLNESS                                                      Since the last visit, Viktoria reports she's doing well.     ADHD =   Reports the Metadate continues to work well for her.   Noticing she is having an easier time completing tasks. Motivation is greatly increased.   Feeling her thinking is clearer. Concentration is better.   Going through a difficult time at work right now, but feeling she's managing better because of the Metadate.   The first week she started it, noticed some mild increased in anxiety. This has since resolved.  Sleep was initially improved after starting Metadate. Now, sleep is a bit worse again, but she attributes this to work stressors.    Mood = good, generally better since she's been able to accomplish tasks more easily, denies depressive symptoms  Anxiety = some elevation remains, attributes to work stress  Appetite = did note some decreased appetite since starting Metadate, working to put strategies in place so she doesn't forget meals  Energy = good, less daytime fatigue  Sleep = bedtime routine starts at 2100, lights off 2130, falls asleep between 2200 and 2300, waking 0500. Most nights falling asleep closer to 2200, so slight improvement since starting Metadate.   Taking all medications at bedtime every night.  "    Denies SI.     Adverse Med Effects:  See table below  Pertinent Negatives:  No   Recent Substance Use:    None reported    SOCIAL and FAMILY HISTORY                                                 per pt report         Family Hx:  Not obtained at this visit.     Social Hx:  Social Support - good social support from girlfriend \"great relationship\"    PAST PSYCH and SUBSTANCE USE HISTORY                      Psych:  Suicidal ideation - Past, none currently   SIB - One prior episode 2013, none currently      Substance Use:  No additional substance use history.    MEDICAL HISTORY     Patient Active Problem List   Diagnosis    Depression with anxiety    Chronic idiopathic constipation    ADD (attention deficit disorder)    Dyspepsia    Essential hypertension    Gastroparesis    GERD (gastroesophageal reflux disease)    Insomnia    Sleep disorder    Social anxiety disorder    Spastic colon    Suicidal ideation    Depression, unspecified depression type    Encounter for pharmacogenetic testing       [place] Provider: Alexandra (not PCP).    ALLERGIES: Ginger and Citalopram     MEDICAL REVIEW OF SYSTEMS                                                                  none in addition to that documented above    CURRENT MEDS       Current Outpatient Medications   Medication Sig Dispense Refill    amitriptyline (ELAVIL) 50 MG tablet Take 1 tablet (50 mg) by mouth at bedtime 30 tablet 3    cloNIDine (CATAPRES) 0.1 MG tablet Take 1 tablet (0.1 mg) by mouth at bedtime 30 tablet 3    dicyclomine (BENTYL) 10 MG capsule Take 10 mg by mouth 3 times daily as needed (with meals)      docusate sodium (DSS) 250 MG capsule Take 250 mg by mouth daily as needed      eszopiclone (LUNESTA) 3 MG tablet Take 1 tablet (3 mg) by mouth as needed for sleep 30 tablet 3    methylphenidate (METADATE CD) 10 MG CR capsule Take 1 capsule (10 mg) by mouth daily for 30 days 30 capsule 0    [START ON 4/25/2024] methylphenidate (METADATE CD) 10 MG CR " capsule Take 1 capsule (10 mg) by mouth daily for 30 days 30 capsule 0    [START ON 5/26/2024] methylphenidate (METADATE CD) 10 MG CR capsule Take 1 capsule (10 mg) by mouth daily for 30 days 30 capsule 0    methylphenidate (METADATE CD) 10 MG CR capsule Take 1 capsule (10 mg) by mouth every morning 30 capsule 0    metoclopramide (REGLAN) 5 MG tablet 5 mg 2 times daily as needed (Nausea)      ondansetron (ZOFRAN ODT) 4 MG ODT tab Take 4 mg by mouth every 6 hours as needed for nausea      pantoprazole (PROTONIX) 40 MG EC tablet Take 40 mg by mouth daily      ramelteon (ROZEREM) 8 MG tablet Take 1 tablet (8 mg) by mouth at bedtime Do not take with a high fat meal. 30 tablet 3    simethicone (MYLICON) 125 MG chewable tablet Take 125 mg by mouth 4 times daily as needed for intestinal gas          Psychotropic Medication Trials      Medication Max Dose (mg) Dates / Duration Helpful? DC Reason / Adverse Effects?   Zoloft (sertraline) Unknown Unknown No None   Paxil (paroxetine) Unknown Unknown No Cause SI at the age of 14   Celexa (citalopram) Unknown Unknown No Full body rash/hives in patient's 20s    Lexapro (escitalopram) Unknown Unknown No None   Wellbutrin  (bupropion) 100mg IR Unknown Yes None   Remeron (mirtazapine) Unknown Unknown Yes Weight gain of 30 pounds in one month   Viibryd (vilazodone) Unknown Unknown Yes Reported long withdrawal    Elavil (amitriptyline) 25-50mg Unknown Yes Somewhat helpful at 25mg; described a strange sensation of mental fog/slowing, but not sedation at 50mg   Abilify (aripriprazole) Unknown Unknown No Reports sedation     Neurontin (gabapentin) 300mg Unknown Unsure Unsure   Inderal (propranolol) 10mg Unknown Yes None   Ambien (zolpidem) 10mg Unknown Yes None   Melatonin Unknown Unknown No None   Xanax (alprazolam) Unknown Unknown Yes None   Vistaril / Atarax (hydroxyzine) 50mg Unknown No Used for sleep, cause some drowsiness, but not enough for sleep onset       VITALS                                                                                               Wt 81.6 kg (180 lb)   BMI 29.95 kg/m     MENTAL STATUS EXAM                                                             Alertness: alert   Appearance: adequately groomed  Behavior/Demeanor: cooperative, with good  eye contact   Speech: regular rate and rhythm  Language: intact  Psychomotor: normal or unremarkable  Mood: description consistent with euthymia  Affect: full range; congruent to: mood- yes, content- yes  Thought Process/Associations: unremarkable  Thought Content:  Reports none;  Denies suicidal & violent ideation and delusions  Perception:  Reports none;  Denies hallucinations  Insight: adequate  Judgment: appropriate  Cognition: does  appear grossly intact; formal cognitive testing was not done  oriented: time, person, and place  attention span: intact  concentration: intact  recent memory: intact  remote memory: intact  fund of knowledge: appropriate  Gait and Station: N/A (telehealth)    LABS and DATA         2/6/2024     7:58 AM 3/21/2024     9:34 AM 3/28/2024     7:12 AM   PHQ   PHQ-9 Total Score 6 3 3   Q9: Thoughts of better off dead/self-harm past 2 weeks Not at all Not at all Not at all       PSYCHOTROPIC DRUG INTERACTIONS   Drug-Drug: cloNIDine and amitriptylineThe antihypertensive effects of Clonidine may be decreased by Tricyclic Antidepressants. Tricyclic Antidepressants may worsen rebound reactions from abrupt Clonidine withdrawal.    MANAGEMENT:  routine monitoring    RISK STATEMENT for SAFETY   Viktoria did not appear to be an imminent safety risk to self or others.    TREATMENT RISK STATEMENT:  The risks, benefits, alternatives and potential adverse effects have been discussed and are understood by the pt. The pt understands the risks of using street drugs or alcohol. There are no medical contraindications, the pt agrees to treatment with the ability to do so. The pt knows to call the clinic for any problems or to  access emergency care if needed.  Medical and substance use concerns are documented above.  Psychotropic drug interaction check was done, including changes made today.    PROVIDER:  BENITO Cho CNP       MEDICAL DECISION MAKING        (Daniel .PSYCHBILLMDM)     Level of Medical Decision Making:   - At least 1 chronic problem that is not stable  - Engaged in prescription drug management during visit (discussed any medication benefits, side effects, alternatives, etc.)

## 2024-03-28 NOTE — PATIENT INSTRUCTIONS
PLAN                                                                                                       1) Meds    CONTINUE Amitriptyline 50 mg daily at bedtime for depression    CONTINUE  Clonidine 0.1 mg at bedtime     CONTINUE Eszopiclone (Lunesta)  3 mg  CONTINUE Ramelteon (Rozerem) 8 mg     CONTINUE Metadate ER 10 mg daily in the morning    2) Other: None today.    3) RTC: In 4 weeks     4) Crisis numbers:    **For crisis resources, please see the information at the end of this document**     Patient Education      Thank you for coming to the [facility] [place] [facility].     Lab Testing:  If you had lab testing today and your results are reassuring or normal they will be mailed to you or sent through aiHit within 7 days. If the lab tests need quick action we will call you with the results. The phone number we will call with results is # [phone]. If this is not the best number please call our clinic and change the number.     Medication Refills:  If you need any refills please call your pharmacy and they will contact us. Our fax number for refills is [phone].   Three business days of notice are needed for general medication refill requests.   Five business days of notice are needed for controlled substance refill requests.   If you need to change to a different pharmacy, please contact the new pharmacy directly. The new pharmacy will help you get your medications transferred.     Contact Us:  Please call [phone] during business hours (8-5:00 M-F).   If you have medication related questions after clinic hours, or on the weekend, please call [phone].     Financial Assistance [phone]   Medical Records [phone]       MENTAL HEALTH CRISIS RESOURCES:  For a emergency help, please call 911 or go to the nearest Emergency Department.     Emergency Walk-In Options:   EmPATH Unit @ [facility] ([place]): [phone] - Specialized mental health  emergency area designed to be calming  [facility] ([place]): [phone]  [facility] ([place]): [phone]  [facility] ([place]): [phone]    [facility] Crisis Information:   [place]: [phone]  [place]: [phone]  [place] ([place]) - Adult: [phone]     Child: [phone]  [place] - Adult: [phone]     Child: [phone]  [place]: [phone]  List of all [facility] resources:   https://[place].gov/dhs/people-we-serve/adults/health-care/mental-health/resources/crisis-contacts.jsp    National Crisis Information:   Crisis Text Line: Text  [place]  to 482118  Suicide & Crisis Lifeline: 988  National Suicide Prevention Lifeline: 0-336-105-TALK (1-325.393.9411)       For online chat options, visit https://suicidepreventionlifeline.org/chat/  Poison Control Center: 1-235.943.3082  Trans Lifeline: 1-956.416.7978 - Hotline for transgender people of all ages  The [employer] Project: 7-119-512-8759 - Hotline for LGBT youth     For Non-Emergency Support:   Fast Tracker: Mental Health & Substance Use Disorder Resources -   https://www.HoozOntrackTwicketern.org/

## 2024-04-15 ENCOUNTER — VIRTUAL VISIT (OUTPATIENT)
Dept: PSYCHIATRY | Facility: CLINIC | Age: 39
End: 2024-04-15
Attending: PSYCHOLOGIST
Payer: COMMERCIAL

## 2024-04-15 DIAGNOSIS — F41.1 GENERALIZED ANXIETY DISORDER: Primary | ICD-10-CM

## 2024-04-15 PROCEDURE — 90837 PSYTX W PT 60 MINUTES: CPT | Mod: 95

## 2024-04-15 NOTE — PROGRESS NOTES
I Magda Greco, Phd, LP did not conduct the present therapy session with this patient, the listed trainee under my supervision conducted this therapy session. I will discuss this patient's diagnosis, final psychological assessment report (if needed) and follow up treatment plan with this trainee during supervision.    Individual Feedback Session                     Date of Service: 4/15/2024  Start Time: 11:00am  Stop Time: 12:00pm  Length of Appointment: 60 minutes    Mode of appointment: Virtual via Amwell   Client's location at time of appointment: home    Clinician's location at time of appointment: office  Care Provider: Larry Castillo MA under Magda Greco, Phd, LP.  Participants: AC and writer    DSM-5 DIAGNOSES  ? 296.31 (F33.0) Major Depressive Disorder, Recurrent, Mild  ? 314.0X (F90.X) Attention-Deficit/Hyperactivity Disorder (ADHD), predominantly inattentive type  ? 300.02 (F41.1) Generalized Anxiety Disorder (LUBNA)    Rule Out:   299.00(F84.0) Autism Spectrum Disorder, Level 1, without accompanying language or speech impairment  SUBJECTIVE:  During this feedback session, this writer and AC reviewed the results of the testing. This writer explained that the overall theme from the testing suggested that her symptoms are consistent with attention-deficit/hyperactivity disorder (ADHD). We also spent a large portion of the feedback meeting discussing that DEIDRE likely falls on the autism spectrum, but that she does not meet the criteria for ASD.     This writer reviewed each assessment and explained what the scores indicated for her cognitive, emotional, and psychological functioning. This writer also answered the patient's questions. AC stated that she understood the findings and understood that this is based on this writer's clinical judgment. This writer provided recommendations, such as addressing some of her autism masking mechanisms and how they are similar and different from coping mechanisms used in  the past for bullying. This can increase her insight into the origin of her challenges and ways she can continue the world from her neurodivergent lived experience.      TREATMENT:  AC is recommended to continue therapy while focusing on the treatment of her anxiety disorders and depressive symptoms. Cognitive behavioral therapy (CBT) was also preferentially recommended as a modality due to the empirical support of its effectiveness.     ASSESSMENT: This session was completed remotely, so observational data is limited. However, the patient appeared well-groomed and appropriately dressed for the session. She appeared engaged during the feedback session and asked questions throughout. She denied any increases in her transient SI and did not appear at risk for her safety.

## 2024-04-22 ENCOUNTER — VIRTUAL VISIT (OUTPATIENT)
Dept: PSYCHIATRY | Facility: CLINIC | Age: 39
End: 2024-04-22
Attending: PSYCHIATRY & NEUROLOGY
Payer: COMMERCIAL

## 2024-04-22 DIAGNOSIS — F90.0 ATTENTION DEFICIT HYPERACTIVITY DISORDER (ADHD), PREDOMINANTLY INATTENTIVE TYPE: ICD-10-CM

## 2024-04-22 DIAGNOSIS — F33.9 MAJOR DEPRESSIVE DISORDER, RECURRENT EPISODE WITH ANXIOUS DISTRESS (H): Primary | ICD-10-CM

## 2024-04-22 DIAGNOSIS — F41.1 GENERALIZED ANXIETY DISORDER: ICD-10-CM

## 2024-04-22 PROCEDURE — 99214 OFFICE O/P EST MOD 30 MIN: CPT | Mod: 95 | Performed by: PSYCHIATRY & NEUROLOGY

## 2024-04-22 RX ORDER — METHYLPHENIDATE HYDROCHLORIDE 20 MG/1
20 CAPSULE, EXTENDED RELEASE ORAL DAILY
Qty: 30 CAPSULE | Refills: 0 | Status: SHIPPED | OUTPATIENT
Start: 2024-05-22 | End: 2024-07-30 | Stop reason: DRUGHIGH

## 2024-04-22 RX ORDER — METHYLPHENIDATE HYDROCHLORIDE 20 MG/1
20 CAPSULE, EXTENDED RELEASE ORAL DAILY
Qty: 30 CAPSULE | Refills: 0 | Status: SHIPPED | OUTPATIENT
Start: 2024-04-22 | End: 2024-05-30

## 2024-04-22 ASSESSMENT — PAIN SCALES - GENERAL: PAINLEVEL: NO PAIN (0)

## 2024-04-22 ASSESSMENT — ANXIETY QUESTIONNAIRES
IF YOU CHECKED OFF ANY PROBLEMS ON THIS QUESTIONNAIRE, HOW DIFFICULT HAVE THESE PROBLEMS MADE IT FOR YOU TO DO YOUR WORK, TAKE CARE OF THINGS AT HOME, OR GET ALONG WITH OTHER PEOPLE: SOMEWHAT DIFFICULT
5. BEING SO RESTLESS THAT IT IS HARD TO SIT STILL: NOT AT ALL
GAD7 TOTAL SCORE: 5
GAD7 TOTAL SCORE: 5
1. FEELING NERVOUS, ANXIOUS, OR ON EDGE: SEVERAL DAYS
GAD7 TOTAL SCORE: 5
7. FEELING AFRAID AS IF SOMETHING AWFUL MIGHT HAPPEN: NOT AT ALL
3. WORRYING TOO MUCH ABOUT DIFFERENT THINGS: NOT AT ALL
7. FEELING AFRAID AS IF SOMETHING AWFUL MIGHT HAPPEN: NOT AT ALL
6. BECOMING EASILY ANNOYED OR IRRITABLE: SEVERAL DAYS
4. TROUBLE RELAXING: SEVERAL DAYS
8. IF YOU CHECKED OFF ANY PROBLEMS, HOW DIFFICULT HAVE THESE MADE IT FOR YOU TO DO YOUR WORK, TAKE CARE OF THINGS AT HOME, OR GET ALONG WITH OTHER PEOPLE?: SOMEWHAT DIFFICULT
2. NOT BEING ABLE TO STOP OR CONTROL WORRYING: MORE THAN HALF THE DAYS

## 2024-04-22 NOTE — NURSING NOTE
Is the patient currently in the state of [place]? YES    Visit mode:VIDEO    If the visit is dropped, the patient can be reconnected by: VIDEO VISIT: Text to cell phone:   Telephone Information:   Mobile [phone]       Will anyone else be joining the visit? NO  (If patient encounters technical issues they should call [phone] :[phone])    How would you like to obtain your AVS? MyChart    Are changes needed to the allergy or medication list? Yes please remove medications flagged for removal:  -dicyclomine (BENTYL) 10 MG capsule   -methylphenidate (METADATE CD) 10 MG CR capsule (DUPLICATE)  -metoclopramide (REGLAN) 5 MG tablet     Are refills needed on medications prescribed by this physician? NO    Reason for visit: RECHECK    Patient prefers to complete questionnaires in [employer] Systemshart rather than over the phone. Pt stated they will complete them prior to joining the video.    Mindi RILEY

## 2024-04-22 NOTE — PATIENT INSTRUCTIONS
PLAN                                                                                                       1) Meds    CONTINUE Amitriptyline 50 mg daily at bedtime for depression    CONTINUE  Clonidine 0.1 mg at bedtime     CONTINUE Eszopiclone (Lunesta)  3 mg  CONTINUE Ramelteon (Rozerem) 8 mg     INCREASE Metadate CD to 20 mg daily in the morning    START Biotene Lozenges for Metadate associated dry mouth as needed three times daily     2) Other: None today.    3) RTC: In 4 weeks     **For crisis resources, please see the information at the end of this document**   Patient Education    Thank you for coming to the [facility] [place] [facility].     Lab Testing:  If you had lab testing today and your results are reassuring or normal they will be mailed to you or sent through [place] within 7 days. If the lab tests need quick action we will call you with the results. The phone number we will call with results is # [phone]. If this is not the best number please call our clinic and change the number.     Medication Refills:  If you need any refills please call your pharmacy and they will contact us. Our fax number for refills is [phone].   Three business days of notice are needed for general medication refill requests.   Five business days of notice are needed for controlled substance refill requests.   If you need to change to a different pharmacy, please contact the new pharmacy directly. The new pharmacy will help you get your medications transferred.     Contact Us:  Please call [phone] during business hours (8-5:00 M-F).   If you have medication related questions after clinic hours, or on the weekend, please call [phone].     Financial Assistance [phone]   Medical Records [phone]       [facility] CRISIS RESOURCES:  For a emergency help, please call 911 or go to the nearest Emergency Department.     Emergency Walk-In Options:   EmPATH Unit @ [place]  [facility] ([place]): [phone] - Specialized mental health emergency area designed to be calming  [facility] ([place]): [phone]  [facility] ([place]): [phone]  [facility] ([place]): [phone]    [facility] Crisis Information:   [place]: [phone]  [place]: [phone]  [place] ([place]) - Adult: [phone]     Child: [phone]  [place] - Adult: [phone]     Child: [phone]  [place]: [phone]  List of all [facility] resources:   https://[place].gov/dhs/people-we-serve/adults/health-care/mental-health/resources/crisis-contacts.jsp    National Crisis Information:   Crisis Text Line: Text  [place]  to 020326  Suicide & Crisis Lifeline: 988  National Suicide Prevention Lifeline: 1-443-307-TALK (1-291.214.4703)       For online chat options, visit https://suicidepreventionlifeline.org/chat/  Poison Control Center: 1-632.855.4700  Trans Lifeline: 1-871.371.6233 - Hotline for transgender people of all ages  The [employer] Project: 5-075-642-6472 - Hotline for LGBT youth     For Non-Emergency Support:   Fast Tracker: Mental Health & Substance Use Disorder Resources -   https://www.[employer]remationckTHINK360n.org/

## 2024-04-22 NOTE — PROGRESS NOTES
Virtual Visit Details    Type of service:  Video Visit     Originating Location (pt. Location): Home    Distant Location (provider location):  Off-site  Platform used for Video Visit: [facility]  Psychiatry Clinic  PSYCHIATRY PROGRESS NOTE     CARE TEAM:  PCP- No Ref-Primary, Physician   GI- at [facility] [place] .  Viktoria is a 38 year old who prefers the name Viktoria and uses pronouns she, her.     DIAGNOSES                                                                                        Major Depressive Disorder with Anxious Distress  Insomnia    Rule out ADHD    ASSESSMENT                                                                                          Viktoria Guillen presented at intake with symptoms supporting a diagnosis of MAJOR DEPRESSIVE DISORDER with anxious distress. While she initially felt some improvement on Wellbutrin  mg, she subsequently experienced significant increase in depressed mood starting in June. Viktoria pursued a referral to [facility] for ketamine treatment for depression. She reported a significant response in terms of mood and physical GI distress symptoms. Anxiety and insomnia continued to be an issue and possibly worsened after starting ketamine, but has since improved with the addition of ramelteon and eszopiclone. She has not noticed much improvement with gabapentin 300 mg for additional support for nighttime anxiety and insomnia, so discontinued this. Tapered off Wellbutrin, which initially improved anxiety and insomnia. Recently completed Hipvan testing.    Spring 2024: completed psychological testing with Larry Castillo which confirmed diagnoses of ASD, ADHD.     Viktoria felt propranolol was no longer as helpful as it used to be. Discontinued and switch to clonidine 0.1 mg at bedtime. Unclear how helpful clonidine has been. Continue amitriptyline, eszopiclone, and ramelteon. Goal of eventually discontinuing eszopiclone.     February 2024:  started stimulant treatment for ADHD. Viktoria meets six inattentive criteria for diagnosis. Clear evidence symptoms started around age 10. ADHD symptoms are impacting her professionally (to the degree that she is constantly fearful she will be fired/ boss has expressed this), also negatively impact her home life, relationship, and ability to manage responsibilities. Viktoria was on stimulant medication for fatigue during college, and felt this was the time in her life when her mental health was most stable. Also reports excelling in college while on stimulant medications.     CamioCam test results predict no gene-drug interaction for dexmethylphenidate (Focalin) or methylphenidate (Ritalin, Concerta). Moderate gene-drug interaction is predicted for atomoxetine (Strattera).    Started Metadate CD 10 mg at the end of February 2024. Viktoria reports this has been very helpful for managing ADHD symptoms. Anxiety initially worse, but then leveled out. Sleep and mood have improved since starting Metadate.   Viktoria wonders if a higher dose would work better, today agreed to trial 20 mg dose. Also started Biotene Lozenges for dry mouth associated with Metadate. Considered trialing a new stimulant, but Viktoria preferred to stay with Metadate for now. Check back in 4 weeks to assess increased dose.     [place]NPMP was checked today:  Indicates taking controlled medication as prescribed.    PLAN                                                                                                       1) Meds    CONTINUE Amitriptyline 50 mg daily at bedtime for depression    CONTINUE  Clonidine 0.1 mg at bedtime     CONTINUE Eszopiclone (Lunesta)  3 mg  CONTINUE Ramelteon (Rozerem) 8 mg     INCREASE Metadate CD to 20 mg daily in the morning    START Biotene Lozenges for Metadate associated dry mouth as needed three times daily     2) Other: None today.    3) RTC: In 4 weeks     4) Crisis numbers in AVS.     CHIEF COMPLAINT                                  "     \" Following up \"     PERTINENT BACKGROUND                                         [initial eval 23]     History of depressive episodes starting at age 14. She reports many past medication trials, as well as a long history of working with a psychotherapist. She has had several episodes of suicidal ideation over the years, with the most significant occurring in . At that time, she had no attempts or concrete plan, but had daily intrusive SI thoughts. In  she had one episode of self-injury that involved cutting her wrist with a steak knife. She reports many episodes of depression over the past 23 years. She also reports a history of anxiety.     Psych pertinent item history includes SIB     HISTORY OF PRESENT ILLNESS                                                      Since the last visit, Viktoria reports she's doing ok. She just returned from a trip to [place] for her mother's 's . She was not particularly close to him, but went to support her mom.   Still experiencing some rumination around work stress / discrimination case. Notes this mostly impacts her at night.     ADHD =   Reports the Metadate continues to work well for her.   Trialed Metadate 20 mg on a couple of days. Did not note an increase in anxiety on days she trialed 20 mg. Does not 10 mg does not feel as effective anymore. Some diminished clarity or sharpness.   Still struggles with staying focused with verbal communication, especially in the afternoons.   Thoughts feel less scattered / distracted.   Increase in motivation. Easier to get going in the morning.     Sleep = dependent upon work anxiety. Sleep has not been difficult when work stress is managed. Was off work this past week, and slept well. During periods of increased work stress, may have up to three nights per week when it takes longer than one hour to fall asleep. Stimulant has not caused an increase in anxiety; if anything notes starting stimulant may have " "been helpful for sleep.     Mood = good, reading a lot, denies depressive symptoms  Anxiety = some rumination remains, attributes to work stress  Appetite = remains a little suppressed since starting Metadate, has put strategies in place so she doesn't forget meals; hasn't forgot any meals since the last appointment   Energy = good, no daytime fatigue  Sleep = bedtime routine still going to bed around 2130, falls asleep between 2200 and 2300, waking 0500.     Denies SI.     Adverse Med Effects:  See table below  Pertinent Negatives:  No   Recent Substance Use:    None reported    SOCIAL and FAMILY HISTORY                                                 per pt report         Family Hx:  Not obtained at this visit.     Social Hx:  Social Support - good social support from girlfriend \"great relationship\"    PAST PSYCH and SUBSTANCE USE HISTORY                      Psych:  Suicidal ideation - Past, none currently   SIB - One prior episode 2013, none currently      Substance Use:  No additional substance use history.    MEDICAL HISTORY     Patient Active Problem List   Diagnosis    Depression with anxiety    Chronic idiopathic constipation    ADD (attention deficit disorder)    Dyspepsia    Essential hypertension    Gastroparesis    GERD (gastroesophageal reflux disease)    Insomnia    Sleep disorder    Social anxiety disorder    Spastic colon    Suicidal ideation    Depression, unspecified depression type    Encounter for pharmacogenetic testing       [place] Provider: Alexandra (not PCP).    ALLERGIES: Razia and Citalopram     MEDICAL REVIEW OF SYSTEMS                                                                  none in addition to that documented above    CURRENT MEDS       Current Outpatient Medications   Medication Sig Dispense Refill    amitriptyline (ELAVIL) 50 MG tablet Take 1 tablet (50 mg) by mouth at bedtime 30 tablet 3    Artificial Saliva (BIOTENE DRY MOUTH) LOZG Take 1 lozenge by mouth 3 times daily as " needed (For medication induced dry mouth.) 81 lozenge 3    cloNIDine (CATAPRES) 0.1 MG tablet Take 1 tablet (0.1 mg) by mouth at bedtime 30 tablet 3    docusate sodium (DSS) 250 MG capsule Take 250 mg by mouth daily as needed      eszopiclone (LUNESTA) 3 MG tablet Take 1 tablet (3 mg) by mouth as needed for sleep 30 tablet 3    methylphenidate (METADATE CD) 10 MG CR capsule Take 1 capsule (10 mg) by mouth daily for 30 days 30 capsule 0    methylphenidate (METADATE CD) 20 MG CR capsule Take 1 capsule (20 mg) by mouth daily 30 capsule 0    [START ON 5/22/2024] methylphenidate (METADATE CD) 20 MG CR capsule Take 1 capsule (20 mg) by mouth daily 30 capsule 0    ondansetron (ZOFRAN ODT) 4 MG ODT tab Take 4 mg by mouth every 6 hours as needed for nausea      pantoprazole (PROTONIX) 40 MG EC tablet Take 40 mg by mouth daily      ramelteon (ROZEREM) 8 MG tablet Take 1 tablet (8 mg) by mouth at bedtime Do not take with a high fat meal. 30 tablet 3    simethicone (MYLICON) 125 MG chewable tablet Take 125 mg by mouth 4 times daily as needed for intestinal gas      dicyclomine (BENTYL) 10 MG capsule Take 10 mg by mouth 3 times daily as needed (with meals)      methylphenidate (METADATE CD) 10 MG CR capsule Take 1 capsule (10 mg) by mouth every morning 30 capsule 0    metoclopramide (REGLAN) 5 MG tablet 5 mg 2 times daily as needed (Nausea)          Psychotropic Medication Trials      Medication Max Dose (mg) Dates / Duration Helpful? DC Reason / Adverse Effects?   Zoloft (sertraline) Unknown Unknown No None   Paxil (paroxetine) Unknown Unknown No Cause SI at the age of 14   Celexa (citalopram) Unknown Unknown No Full body rash/hives in patient's 20s    Lexapro (escitalopram) Unknown Unknown No None   Wellbutrin  (bupropion) 100mg IR Unknown Yes None   Remeron (mirtazapine) Unknown Unknown Yes Weight gain of 30 pounds in one month   Viibryd (vilazodone) Unknown Unknown Yes Reported long withdrawal    Elavil (amitriptyline)  25-50mg Unknown Yes Somewhat helpful at 25mg; described a strange sensation of mental fog/slowing, but not sedation at 50mg   Abilify (aripriprazole) Unknown Unknown No Reports sedation     Neurontin (gabapentin) 300mg Unknown Unsure Unsure   Inderal (propranolol) 10mg Unknown Yes None   Ambien (zolpidem) 10mg Unknown Yes None   Melatonin Unknown Unknown No None   Xanax (alprazolam) Unknown Unknown Yes None   Vistaril / Atarax (hydroxyzine) 50mg Unknown No Used for sleep, cause some drowsiness, but not enough for sleep onset       VITALS                                                                                              There were no vitals taken for this visit.   MENTAL STATUS EXAM                                                             Alertness: alert   Appearance: adequately groomed  Behavior/Demeanor: cooperative, with good  eye contact   Speech: regular rate and rhythm  Language: intact  Psychomotor: normal or unremarkable  Mood: description consistent with euthymia  Affect: full range; congruent to: mood- yes, content- yes  Thought Process/Associations: unremarkable  Thought Content:  Reports none;  Denies suicidal & violent ideation and delusions  Perception:  Reports none;  Denies hallucinations  Insight: adequate  Judgment: appropriate  Cognition: does  appear grossly intact; formal cognitive testing was not done  oriented: time, person, and place  attention span: intact  concentration: intact  recent memory: intact  remote memory: intact  fund of knowledge: appropriate  Gait and Station: N/A ([facility])    LABS and DATA         2/6/2024     7:58 AM 3/21/2024     9:34 AM 3/28/2024     7:12 AM   PHQ   PHQ-9 Total Score 6 3 3   Q9: Thoughts of better off dead/self-harm past 2 weeks Not at all Not at all Not at all       PSYCHOTROPIC DRUG INTERACTIONS   Drug-Drug: cloNIDine and amitriptylineThe antihypertensive effects of Clonidine may be decreased by Tricyclic Antidepressants. Tricyclic  Antidepressants may worsen rebound reactions from abrupt Clonidine withdrawal.    MANAGEMENT:  routine monitoring    RISK STATEMENT for SAFETY   Viktoria did not appear to be an imminent safety risk to self or others.    TREATMENT RISK STATEMENT:  The risks, benefits, alternatives and potential adverse effects have been discussed and are understood by the pt. The pt understands the risks of using street drugs or alcohol. There are no medical contraindications, the pt agrees to treatment with the ability to do so. The pt knows to call the clinic for any problems or to access emergency care if needed.  Medical and substance use concerns are documented above.  Psychotropic drug interaction check was done, including changes made today.    PROVIDER:  BENITO Cho CNP       MEDICAL DECISION MAKING        [facility]     Level of Medical Decision Making:   - At least 1 chronic problem that is not stable  - Engaged in prescription drug management during visit (discussed any medication benefits, side effects, alternatives, etc.)        Answers submitted by the patient for this visit:  LUBNA-7 (Submitted on 4/22/2024)  LUBNA 7 TOTAL SCORE: 5

## 2024-05-22 ENCOUNTER — MYC REFILL (OUTPATIENT)
Dept: PSYCHIATRY | Facility: CLINIC | Age: 39
End: 2024-05-22
Payer: COMMERCIAL

## 2024-05-22 DIAGNOSIS — F90.0 ATTENTION DEFICIT HYPERACTIVITY DISORDER (ADHD), PREDOMINANTLY INATTENTIVE TYPE: ICD-10-CM

## 2024-05-22 RX ORDER — METHYLPHENIDATE HYDROCHLORIDE 20 MG/1
20 CAPSULE, EXTENDED RELEASE ORAL DAILY
Qty: 30 CAPSULE | Refills: 0 | Status: CANCELLED | OUTPATIENT
Start: 2024-05-22

## 2024-05-30 ENCOUNTER — VIRTUAL VISIT (OUTPATIENT)
Dept: PSYCHIATRY | Facility: CLINIC | Age: 39
End: 2024-05-30
Attending: PSYCHIATRY & NEUROLOGY
Payer: COMMERCIAL

## 2024-05-30 VITALS — HEIGHT: 65 IN | WEIGHT: 180 LBS | BODY MASS INDEX: 29.99 KG/M2

## 2024-05-30 DIAGNOSIS — F90.0 ATTENTION DEFICIT HYPERACTIVITY DISORDER (ADHD), PREDOMINANTLY INATTENTIVE TYPE: Primary | ICD-10-CM

## 2024-05-30 DIAGNOSIS — F99 INSOMNIA DUE TO OTHER MENTAL DISORDER: ICD-10-CM

## 2024-05-30 DIAGNOSIS — F51.05 INSOMNIA DUE TO OTHER MENTAL DISORDER: ICD-10-CM

## 2024-05-30 DIAGNOSIS — F33.9 MAJOR DEPRESSIVE DISORDER, RECURRENT EPISODE WITH ANXIOUS DISTRESS (H): ICD-10-CM

## 2024-05-30 PROCEDURE — G2211 COMPLEX E/M VISIT ADD ON: HCPCS | Mod: 95 | Performed by: PSYCHIATRY & NEUROLOGY

## 2024-05-30 PROCEDURE — 99214 OFFICE O/P EST MOD 30 MIN: CPT | Mod: 95 | Performed by: PSYCHIATRY & NEUROLOGY

## 2024-05-30 RX ORDER — METHYLPHENIDATE HYDROCHLORIDE 30 MG/1
30 CAPSULE, EXTENDED RELEASE ORAL DAILY
Qty: 30 CAPSULE | Refills: 0 | Status: SHIPPED | OUTPATIENT
Start: 2024-06-20 | End: 2024-07-30

## 2024-05-30 RX ORDER — RAMELTEON 8 MG/1
8 TABLET ORAL AT BEDTIME
Qty: 30 TABLET | Refills: 3 | Status: SHIPPED | OUTPATIENT
Start: 2024-05-30 | End: 2024-07-30

## 2024-05-30 RX ORDER — METHYLPHENIDATE HYDROCHLORIDE 30 MG/1
30 CAPSULE, EXTENDED RELEASE ORAL EVERY MORNING
Qty: 30 CAPSULE | Refills: 0 | Status: SHIPPED | OUTPATIENT
Start: 2024-07-18 | End: 2024-07-30

## 2024-05-30 RX ORDER — METHYLPHENIDATE HYDROCHLORIDE 10 MG/1
10 CAPSULE, EXTENDED RELEASE ORAL EVERY MORNING
Qty: 22 CAPSULE | Refills: 0 | Status: SHIPPED | OUTPATIENT
Start: 2024-05-30 | End: 2024-07-30 | Stop reason: DRUGHIGH

## 2024-05-30 RX ORDER — AMITRIPTYLINE HYDROCHLORIDE 50 MG/1
50 TABLET ORAL AT BEDTIME
Qty: 30 TABLET | Refills: 3 | Status: SHIPPED | OUTPATIENT
Start: 2024-05-30 | End: 2024-07-30

## 2024-05-30 RX ORDER — ESZOPICLONE 3 MG/1
3 TABLET, FILM COATED ORAL PRN
Qty: 30 TABLET | Refills: 3 | Status: SHIPPED | OUTPATIENT
Start: 2024-05-30 | End: 2024-07-30

## 2024-05-30 RX ORDER — CLONIDINE HYDROCHLORIDE 0.2 MG/1
0.2 TABLET ORAL AT BEDTIME
Qty: 30 TABLET | Refills: 3 | Status: SHIPPED | OUTPATIENT
Start: 2024-05-30 | End: 2024-07-30

## 2024-05-30 ASSESSMENT — PATIENT HEALTH QUESTIONNAIRE - PHQ9
10. IF YOU CHECKED OFF ANY PROBLEMS, HOW DIFFICULT HAVE THESE PROBLEMS MADE IT FOR YOU TO DO YOUR WORK, TAKE CARE OF THINGS AT HOME, OR GET ALONG WITH OTHER PEOPLE: NOT DIFFICULT AT ALL
SUM OF ALL RESPONSES TO PHQ QUESTIONS 1-9: 4
SUM OF ALL RESPONSES TO PHQ QUESTIONS 1-9: 4

## 2024-05-30 ASSESSMENT — PAIN SCALES - GENERAL: PAINLEVEL: NO PAIN (0)

## 2024-05-30 NOTE — NURSING NOTE
Is the patient currently in the state of [place]? YES    Visit mode:VIDEO    If the visit is dropped, the patient can be reconnected by: VIDEO VISIT: Text to cell phone:   Telephone Information:   Mobile [phone]       Will anyone else be joining the visit? NO  (If patient encounters technical issues they should call [phone]    How would you like to obtain your AVS? MyChart    Are changes needed to the allergy or medication list? N/A    Are refills needed on medications prescribed by this physician? YES    Reason for visit: RECHECK    Demetrice RILEY

## 2024-05-30 NOTE — PROGRESS NOTES
Virtual Visit Details    Type of service:  Video Visit     Originating Location (pt. Location): Home    Distant Location (provider location):  Off-site  Platform used for Video Visit: [facility]  Psychiatry Clinic  PSYCHIATRY PROGRESS NOTE     CARE TEAM:  PCP- No Ref-Primary, Physician   GI- at [facility] .  Viktoria is a 38 year old who prefers the name Viktoria and uses pronouns she, her.     DIAGNOSES                                                                                        Major Depressive Disorder with Anxious Distress  Insomnia  ADHD  ASD    ASSESSMENT                                                                                          Viktoria Guillen presented at intake with symptoms supporting a diagnosis of MAJOR DEPRESSIVE DISORDER with anxious distress. While she initially felt some improvement on Wellbutrin  mg, she subsequently experienced significant increase in depressed mood starting in June 2023. Viktoria pursued a referral to [facility] for ketamine treatment for depression. She reported a significant response in terms of mood and physical GI distress symptoms. Anxiety and insomnia continued to be an issue and possibly worsened after starting ketamine, but has since improved with the addition of ramelteon and eszopiclone. She did not notice much improvement with gabapentin 300 mg for additional support for nighttime anxiety and insomnia, so discontinued this. Tapered off Wellbutrin, which initially improved anxiety and insomnia. Recently completed Brevado testing.    Spring 2024: completed psychological testing with Larry Castillo which confirmed diagnoses of ASD, ADHD.     Viktoria felt propranolol was no longer as helpful as it used to be. Discontinued and switch to clonidine 0.1 mg at bedtime. Today, increased to 0.2 mg. Continue amitriptyline, eszopiclone, and ramelteon. Goal of eventually discontinuing eszopiclone.     February 2024: started  stimulant treatment for ADHD. Viktoria meets six inattentive criteria for diagnosis. Clear evidence symptoms started around age 10. ADHD symptoms are impacting her professionally (to the degree that she is constantly fearful she will be fired/ boss has expressed this), also negatively impact her home life, relationship, and ability to manage responsibilities. Viktoria was on stimulant medication for fatigue during college, and felt this was the time in her life when her mental health was most stable. Also reports excelling in college while on stimulant medications.     Tremor Video test results predict no gene-drug interaction for dexmethylphenidate (Focalin) or methylphenidate (Ritalin, Concerta). Moderate gene-drug interaction is predicted for atomoxetine (Strattera).    Started Metadate CD 10 mg at the end of February 2024. Viktoria reports this has been very helpful for managing ADHD symptoms. Anxiety initially worse, but then leveled out. Sleep and mood have improved since starting Metadate.   In March trial of 20 mg dose. Also started Biotene Lozenges for dry mouth associated with Metadate. Considered trialing a new stimulant, but Viktoria preferred to stay with Metadate for now.   End of May 2024, increased Metadate to 30 mg.  Check back in 4-8 weeks to assess increased dose.     [place]MP was checked today:  Indicates taking controlled medication as prescribed.    PLAN                                                                                                       1) Meds    CONTINUE Amitriptyline 50 mg daily at bedtime for depression    INCREASE  Clonidine to 0.2 mg at bedtime -Please send recent BP via [employer] prior to starting new dose.    CONTINUE Eszopiclone (Lunesta)  3 mg  CONTINUE Ramelteon (Rozerem) 8 mg     INCREASE Metadate CD to 30 mg daily in the morning    CONTINUE Biotene Lozenges for Metadate associated dry mouth as needed three times daily     2) Other: None today.    3) RTC: In 4 weeks     4) Crisis numbers  "in AVS.     CHIEF COMPLAINT                                      \" Following up \"     PERTINENT BACKGROUND                                         [initial eval 02/16/23]     History of depressive episodes starting at age 14. She reports many past medication trials, as well as a long history of working with a psychotherapist. She has had several episodes of suicidal ideation over the years, with the most significant occurring in 2015. At that time, she had no attempts or concrete plan, but had daily intrusive SI thoughts. In 2013 she had one episode of self-injury that involved cutting her wrist with a steak knife. She reports many episodes of depression over the past 23 years. She also reports a history of anxiety.     Psych pertinent item history includes SIB     HISTORY OF PRESENT ILLNESS                                                      Since the last visit, Viktoria reports things have been good since we last checked in.     Mood = \"pretty good\" despite some ongoing work stressors  Anxiety = \"about the same,\" which she describes as an overall improvement    ADHD = some ongoing difficulty with executive function a few days per month; occasional difficulty staying on task. Reports the Metadate continues to work well for her overall.    Trialed Metadate 20 mg, which was more helpful than 10 mg. Possibly more room for improvement.     Sleep = still struggles to shut brain off to fall asleep, working on journaling at bedtime per therapist suggestion. Getting 7-9 hours of sleep per night.   Appetite = remains a little suppressed since starting Metadate, has put strategies in place so she doesn't forget meals; hasn't forgot any meals since the last appointment; weight has been stable   Energy = good, no daytime fatigue    Denies SI.     Adverse Med Effects:  See table below  Pertinent Negatives:  No   Recent Substance Use:    None reported    SOCIAL and FAMILY HISTORY                                                 per " "pt report         Family Hx:  Not obtained at this visit.     Social Hx:  Social Support - good social support from girlfriend \"great relationship\"    PAST PSYCH and SUBSTANCE USE HISTORY                      Psych:  Suicidal ideation - Past, none currently   SIB - One prior episode 2013, none currently      Substance Use:  No additional substance use history.    MEDICAL HISTORY     Patient Active Problem List   Diagnosis    Depression with anxiety    Chronic idiopathic constipation    ADD (attention deficit disorder)    Dyspepsia    Essential hypertension    Gastroparesis    GERD (gastroesophageal reflux disease)    Insomnia    Sleep disorder    Social anxiety disorder    Spastic colon    Suicidal ideation    Depression, unspecified depression type    Encounter for pharmacogenetic testing       [place] Provider: Alexandra (not PCP).    ALLERGIES: Ginger and Citalopram     MEDICAL REVIEW OF SYSTEMS                                                                  none in addition to that documented above    CURRENT MEDS       Current Outpatient Medications   Medication Sig Dispense Refill    amitriptyline (ELAVIL) 50 MG tablet Take 1 tablet (50 mg) by mouth at bedtime 30 tablet 3    cloNIDine (CATAPRES) 0.2 MG tablet Take 1 tablet (0.2 mg) by mouth at bedtime 30 tablet 3    eszopiclone (LUNESTA) 3 MG tablet Take 1 tablet (3 mg) by mouth as needed for sleep 30 tablet 3    methylphenidate (METADATE CD) 10 MG CR capsule Take 1 capsule (10 mg) by mouth every morning Take together with 20 mg capsule for a total dose of 30 mg. 22 capsule 0    [START ON 6/20/2024] methylphenidate (METADATE CD) 30 MG CR capsule Take 1 capsule (30 mg) by mouth daily 30 capsule 0    [START ON 7/18/2024] methylphenidate (METADATE CD) 30 MG CR capsule Take 1 capsule (30 mg) by mouth every morning 30 capsule 0    ramelteon (ROZEREM) 8 MG tablet Take 1 tablet (8 mg) by mouth at bedtime Do not take with a high fat meal. 30 tablet 3    Artificial Saliva " "(BIOTENE DRY MOUTH) LOZG Take 1 lozenge by mouth 3 times daily as needed (For medication induced dry mouth.) 81 lozenge 3    docusate sodium (DSS) 250 MG capsule Take 250 mg by mouth daily as needed      methylphenidate (METADATE CD) 20 MG CR capsule Take 1 capsule (20 mg) by mouth daily 30 capsule 0    ondansetron (ZOFRAN ODT) 4 MG ODT tab Take 4 mg by mouth every 6 hours as needed for nausea      pantoprazole (PROTONIX) 40 MG EC tablet Take 40 mg by mouth daily      simethicone (MYLICON) 125 MG chewable tablet Take 125 mg by mouth 4 times daily as needed for intestinal gas          Psychotropic Medication Trials      Medication Max Dose (mg) Dates / Duration Helpful? DC Reason / Adverse Effects?   Zoloft (sertraline) Unknown Unknown No None   Paxil (paroxetine) Unknown Unknown No Cause SI at the age of 14   Celexa (citalopram) Unknown Unknown No Full body rash/hives in patient's 20s    Lexapro (escitalopram) Unknown Unknown No None   Wellbutrin  (bupropion) 100mg IR Unknown Yes None   Remeron (mirtazapine) Unknown Unknown Yes Weight gain of 30 pounds in one month   Viibryd (vilazodone) Unknown Unknown Yes Reported long withdrawal    Elavil (amitriptyline) 25-50mg Unknown Yes Somewhat helpful at 25mg; described a strange sensation of mental fog/slowing, but not sedation at 50mg   Abilify (aripriprazole) Unknown Unknown No Reports sedation     Neurontin (gabapentin) 300mg Unknown Unsure Unsure   Inderal (propranolol) 10mg Unknown Yes None   Ambien (zolpidem) 10mg Unknown Yes None   Melatonin Unknown Unknown No None   Xanax (alprazolam) Unknown Unknown Yes None   Vistaril / Atarax (hydroxyzine) 50mg Unknown No Used for sleep, cause some drowsiness, but not enough for sleep onset       VITALS                                                                                              Ht 1.638 m (5' 4.5\")   Wt 81.6 kg (180 lb)   BMI 30.42 kg/m     MENTAL STATUS EXAM                                                     "         Alertness: alert   Appearance: adequately groomed  Behavior/Demeanor: cooperative, with good  eye contact   Speech: regular rate and rhythm  Language: intact  Psychomotor: normal or unremarkable  Mood: description consistent with euthymia  Affect: full range; congruent to: mood- yes, content- yes  Thought Process/Associations: unremarkable  Thought Content:  Reports none;  Denies suicidal & violent ideation and delusions  Perception:  Reports none;  Denies hallucinations  Insight: adequate  Judgment: appropriate  Cognition: does  appear grossly intact; formal cognitive testing was not done  oriented: time, person, and place  attention span: intact  concentration: intact  recent memory: intact  remote memory: intact  fund of knowledge: appropriate  Gait and Station: N/A (telehealth)    LABS and DATA         3/21/2024     9:34 AM 3/28/2024     7:12 AM 5/30/2024    12:24 PM   PHQ   PHQ-9 Total Score 3 3 4   Q9: Thoughts of better off dead/self-harm past 2 weeks Not at all Not at all Not at all       PSYCHOTROPIC DRUG INTERACTIONS   Drug-Drug: cloNIDine and amitriptylineThe antihypertensive effects of Clonidine may be decreased by Tricyclic Antidepressants. Tricyclic Antidepressants may worsen rebound reactions from abrupt Clonidine withdrawal.    MANAGEMENT:  routine monitoring    RISK STATEMENT for SAFETY   Viktoria did not appear to be an imminent safety risk to self or others.    TREATMENT RISK STATEMENT:  The risks, benefits, alternatives and potential adverse effects have been discussed and are understood by the pt. The pt understands the risks of using street drugs or alcohol. There are no medical contraindications, the pt agrees to treatment with the ability to do so. The pt knows to call the clinic for any problems or to access emergency care if needed.  Medical and substance use concerns are documented above.  Psychotropic drug interaction check was done, including changes made today.    PROVIDER:  Marixa LEI  BENITO Kay CNP       MEDICAL DECISION MAKING        (Daniel .PSYCHBILLMDM)     Level of Medical Decision Making:   - At least 1 chronic problem that is not stable  - Engaged in prescription drug management during visit (discussed any medication benefits, side effects, alternatives, etc.)      The longitudinal plan of care for the diagnosis(es)/condition(s) as documented were addressed during this visit. Due to the added complexity in care, I will continue to support Viktoria in the subsequent management and with ongoing continuity of care.

## 2024-05-30 NOTE — PATIENT INSTRUCTIONS
PLAN                                                                                                       1) Meds    CONTINUE Amitriptyline 50 mg daily at bedtime for depression    INCREASE  Clonidine to 0.2 mg at bedtime -Please send recent BP via [employer] prior to starting new dose.    CONTINUE Eszopiclone (Lunesta)  3 mg  CONTINUE Ramelteon (Rozerem) 8 mg     INCREASE Metadate CD to 30 mg daily in the morning    CONTINUE Biotene Lozenges for Metadate associated dry mouth as needed three times daily     2) Other: None today.    3) RTC: In 8 weeks     4) Crisis numbers    **For crisis resources, please see the information at the end of this document**     Patient Education      Thank you for coming to the [facility].     Lab Testing:  If you had lab testing today and your results are reassuring or normal they will be mailed to you or sent through [employer] within 7 days. If the lab tests need quick action we will call you with the results. The phone number we will call with results is # [phone]. If this is not the best number please call our clinic and change the number.     Medication Refills:  If you need any refills please call your pharmacy and they will contact us. Our fax number for refills is [phone].   Three business days of notice are needed for general medication refill requests.   Five business days of notice are needed for controlled substance refill requests.   If you need to change to a different pharmacy, please contact the new pharmacy directly. The new pharmacy will help you get your medications transferred.     Contact Us:  Please call [phone] during business hours (8-5:00 M-F).   If you have medication related questions after clinic hours, or on the weekend, please call [phone].     Financial Assistance [phone]   Medical Records [phone]       MENTAL HEALTH CRISIS RESOURCES:  For a emergency help, please call 251 or go  to the nearest Emergency Department.     Emergency Walk-In Options:   Em[facility] Unit @ [facility] ([place]): [phone] - Specialized mental health emergency area designed to be calming  [facility] ([place]): [phone]  [facility] ([place]): [phone]  [facility] ([place]): [phone]    [facility] Crisis Information:   [place]: [phone]  [place]: [phone]  [place] ([place]) - Adult: [phone]     Child: [phone]  [place] - Adult: [phone]     Child: [phone]  [place]: [phone]  List of all [facility] resources:   [email or web address]/people-[email or web address]-care/mental-health/resources/crisis-contacts.jsp    National Crisis Information:   Crisis Text Line: Text  [place]  to 325959  Suicide & Crisis Lifeline: 988  National Suicide Prevention Lifeline: 3-369-976-TALK (1-971.736.1356)       For online chat options, visit https://suicidepreventionlifeline.org/chat/  Poison Control Center: 1-616.145.3323  Trans Lifeline: 1-387.522.4723 - Hotline for transgender people of all ages  The [employer]: 1-682-585-9265 - Hotline for LGBT youth     For Non-Emergency Support:   Fast Tracker: Mental Health & Substance Use Disorder Resources -   https://www.[place].org/

## 2024-05-30 NOTE — PROGRESS NOTES
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 sarcastic person unless I am being passive-aggressive . She misses it if others are being sarcastic, and threw a lot of temper tantrums when she was a child. Play: DEIDRE had some friends, and appropriately played with Carrie dolls. DEIDRE was not a coordinated child and struggled to do gymnastics and other sports. Academic: DEIDRE was a heavy reader and was told to go play with others. She was in the top 90th percentile for all of the testing. She knew how to read very early. Hygiene: As a child DEIDRE had to be reminded to brush her teeth, and wet her bed until age 9. Currently, DEIDRE states that as an adult she maintains hygiene but does the bare minimum. It must be noted that difficulties maintaining hygiene can be affected by both anxiety and depressive symptoms. Sensitivities: DEIDRE could never wear anything polyester and still cannot touch it with her hands because the fabric is   scratchy . She also cannot sleep on polyester and has to have cotton sheets. DEIDRE will not eat chewy meat because the texture makes her uncomfortable. As a child, she was content eating the same five things. When DEIDRE was overstimulated, she would rub her knuckles.     SUMMARY AND RECOMMENDATIONS   DEIDRE is a 38-year-old woman who prefers women as romantic interests and who uses  she/her  pronouns. DEIDRE was referred by BENITO Castaneda, CNP for diagnostic clarification related to the possible presence of neurodevelopmental disabilities, such as Autism Spectrum Disorder (ASD) and Attention-deficit/Hyperactivity Disorder (ADHD). Psychological testing was conducted to provide diagnostic clarification and inform  the development of an appropriate psychological treatment plan. DEIDRE has preserved overall cognitive skills and superior IQ. She also has preserved attentional capabilities during cognitively demanding but understimulating tasks. DEIDRE's currently reported executive functioning difficulties and difficulties with attention, concentration and memory are likely associated with current active internalizing disorders, prominently generalized anxiety and major depression. DEIDRE does not meet criteria for ASD and due to the presence of both clinically significant symptoms of anxiety and depression it is not possible to provide a diagnosis of ADHD as criteria E is not fulfilled.   Specifically DEIDRE's symptoms can be explained by the presence of another mental disorder, namely both anxiety and depression. Both conditions are associated with inattentiveness, forgetfulness, and concentration difficulties. Similarly, difficulties with social interactions, hygiene and interpretation of social signals are prominently affected by depressive symptoms. However, given reported difficulties since early childhood by both DEIDRE and her mother it is possible that DEIDRE has had minor executive function deficits since early childhood. Nevertheless, given her highly preserved cognitive skills it is more likely that cognitive behavioral therapy (CBT) as well moderate use of either antidepressants or anxiolytic medication may assuage some of the difficulties currently experienced by DEIDRE. Research suggests that the combination of therapy and low doses of antidepressants and/or anxiolytics yield optimal results to treat internalizing disorders such as depression and anxiety.    DSM-5 DIAGNOSES  300.02 (F41.1) Generalized Anxiety Disorder  296.31 (F.33.0) Major Depressive Disorder, mild, recurrent   (R41. 84) Frontal lobe and executive function deficit.    RECOMMENDED TREATMENT   DEIDRE is recommended to seek coaching for executive functioning and organizational  skills at the same time or after she receives treatment for major depression and generalized anxiety disorder. Specifically, cognitive behavioral therapy (CBT) or any other type of adequate therapy will aid her to reduce current depression and anxiety symptoms.DEIDRE is recommended to continue to learn about executive function difficulties and learn the range of both abilities and partial limitations they may entail. DEIDRE is recommended to understand both her challenges and strengths that show up in her daily life.  The strongest recommendation of this report is to continue and seek treatment for anxiety and depression with a combination of both therapy and medication as needed.  DEIDRE would benefit from continuing to collaborate with her care team, such as BENITO Castaneda, CNP, and continue to monitor her mental health symptoms specifically to treat depression and anxiety symptoms.   DEIDRE has the ability to continue working and the capacity to develop and expand her existing cognitive skills particularly if she receives therapy for past trauma and difficulties with interpersonal social communication.    Self-help Strategies:   Pomodoro Technique: This is a time management technique in which the individual breaks down a task into four smaller chunks with structured breaks in between each interval of time working. The steps are as follows:  Get your to-do list and select one task from it  Set a timer for 20-25 minutes and focus only on that task until the timer rings  Curt off your progress/completion  Enjoy 5-minute break  Start the next task or continue working on the same task for 25 minutes uninterrupted  After your fourth working session, take a 25-30-minute break to re-energize  Repeat  2.     Body Doubling: This is a productivity strategy to help individuals who struggle with starting and completing tasks improve their productivity. We can use this strategy by asking a friend, partner, or family member to sit with us  (not advise) while we work as an additional form of accountability. This can be particularly helpful with tasks that are lengthy, repetitive, or boring.  We can ask our support person to sit in the room with us while they engage in a non-distracting, and quiet task while we are working. This could even be done with strangers in the form of coffee shops or libraries. When others are around us, it can make us less likely to get sidetracked compared to if we are alone.     APPENDIX     PSYCHOLOGICAL TEST DATA    The test data listed below use one or more of the following formats:  Scaled Scores have an average of 10 and a standard deviation of 3 (the average range is 7 to 13).  T-Scores have an average range of 50 and standard deviation of 10 (average range is 40 to 60).  Raw Scores are a total of items endorsed on a Likert-scale.     PERSONALITY & MOOD MEASURES    MMPI-3:  The [place] Multiphasic Personality Inventory-3 (MMPI-3) is a 338-item standardized questionnaire that assesses the level of emotional adjustment and psychopathology, and provides a description of an individual's personality.                                         COGNITIVE & EXECUTIVE FUNCTIONING MEASURES       The Wechsler Adult Intelligence Scale - Fourth Edition (WAIS-IV):    The WAIS-IV is a measure of general intellectual abilities, incorporating measures of both verbal and   non-verbal skills. Composite scores from 85 - 115 represent the average range of functioning.      Scale Composite/ Standard Score Percentile Rank 95% Confidence Interval Description   Verbal Comprehension 138 99th 131-142 Very Superior   Perceptual Reasoning 113 81st 106-119 Above Average   Working Memory 122 93rd 114-127 Superior   Processing Speed 92 30th  Average/  Within Normal Limits   Full Scale (FSIQ)  121 92nd 117-125 Superior     WAIS-IV Subtests: Scaled scores from 8-12 represent the average range of functioning       Index Subtest Scaled Score Index  Subtest Scaled Score    Verbal Comprehension Similarities 14 Perceptual Reasoning Block Design 10    Vocabulary 17  Matrix Reasoning 12    Information 18  Visual Puzzles 15   Processing Speed Symbol Search 9 Working Memory Digit Span 11    Coding 8  Arithmetic 17

## 2024-06-24 ENCOUNTER — MYC MEDICAL ADVICE (OUTPATIENT)
Dept: PSYCHIATRY | Facility: CLINIC | Age: 39
End: 2024-06-24
Payer: COMMERCIAL

## 2024-06-24 NOTE — TELEPHONE ENCOUNTER
Marixa,    I have the forms printed. Let me know your thoughts on this and I can start the paperwork    Thanks    Bianka

## 2024-06-26 NOTE — TELEPHONE ENCOUNTER
Completed, signed forms were returned to this writer and securely emailed, per patient request - no ANGELA on file, to [email or web address]  The original copies were faxed to scanning and are held in triage until scanning is verified.Bianka Piaz LPN Lead

## 2024-07-13 ENCOUNTER — HEALTH MAINTENANCE LETTER (OUTPATIENT)
Age: 39
End: 2024-07-13

## 2024-07-30 ENCOUNTER — VIRTUAL VISIT (OUTPATIENT)
Dept: PSYCHIATRY | Facility: CLINIC | Age: 39
End: 2024-07-30
Attending: PSYCHIATRY & NEUROLOGY
Payer: COMMERCIAL

## 2024-07-30 DIAGNOSIS — F99 INSOMNIA DUE TO OTHER MENTAL DISORDER: ICD-10-CM

## 2024-07-30 DIAGNOSIS — F90.0 ATTENTION DEFICIT HYPERACTIVITY DISORDER (ADHD), PREDOMINANTLY INATTENTIVE TYPE: ICD-10-CM

## 2024-07-30 DIAGNOSIS — F33.9 MAJOR DEPRESSIVE DISORDER, RECURRENT EPISODE WITH ANXIOUS DISTRESS (H): ICD-10-CM

## 2024-07-30 DIAGNOSIS — F51.05 INSOMNIA DUE TO OTHER MENTAL DISORDER: ICD-10-CM

## 2024-07-30 PROCEDURE — 99214 OFFICE O/P EST MOD 30 MIN: CPT | Mod: 95 | Performed by: PSYCHIATRY & NEUROLOGY

## 2024-07-30 PROCEDURE — G2211 COMPLEX E/M VISIT ADD ON: HCPCS | Mod: 95 | Performed by: PSYCHIATRY & NEUROLOGY

## 2024-07-30 RX ORDER — RAMELTEON 8 MG/1
8 TABLET ORAL AT BEDTIME
Qty: 30 TABLET | Refills: 3 | Status: SHIPPED | OUTPATIENT
Start: 2024-07-30

## 2024-07-30 RX ORDER — METHYLPHENIDATE HYDROCHLORIDE 30 MG/1
30 CAPSULE, EXTENDED RELEASE ORAL EVERY MORNING
Qty: 30 CAPSULE | Refills: 0 | Status: SHIPPED | OUTPATIENT
Start: 2024-09-20 | End: 2024-09-24

## 2024-07-30 RX ORDER — CLONIDINE HYDROCHLORIDE 0.2 MG/1
0.2 TABLET ORAL AT BEDTIME
Qty: 30 TABLET | Refills: 3 | Status: SHIPPED | OUTPATIENT
Start: 2024-07-30

## 2024-07-30 RX ORDER — METHYLPHENIDATE HYDROCHLORIDE 30 MG/1
30 CAPSULE, EXTENDED RELEASE ORAL EVERY MORNING
Qty: 30 CAPSULE | Refills: 0 | Status: SHIPPED | OUTPATIENT
Start: 2024-08-23 | End: 2024-08-21

## 2024-07-30 RX ORDER — ESZOPICLONE 3 MG/1
3 TABLET, FILM COATED ORAL PRN
Qty: 30 TABLET | Refills: 3 | Status: SHIPPED | OUTPATIENT
Start: 2024-07-30

## 2024-07-30 RX ORDER — AMITRIPTYLINE HYDROCHLORIDE 50 MG/1
50 TABLET ORAL AT BEDTIME
Qty: 30 TABLET | Refills: 3 | Status: SHIPPED | OUTPATIENT
Start: 2024-07-30

## 2024-07-30 RX ORDER — GABAPENTIN 100 MG/1
100 CAPSULE ORAL 3 TIMES DAILY
Qty: 90 CAPSULE | Refills: 3 | Status: SHIPPED | OUTPATIENT
Start: 2024-07-30

## 2024-07-30 RX ORDER — METHYLPHENIDATE HYDROCHLORIDE 30 MG/1
30 CAPSULE, EXTENDED RELEASE ORAL EVERY MORNING
Qty: 30 CAPSULE | Refills: 0 | Status: SHIPPED | OUTPATIENT
Start: 2024-10-18

## 2024-07-30 ASSESSMENT — ANXIETY QUESTIONNAIRES
GAD7 TOTAL SCORE: 8
GAD7 TOTAL SCORE: 8
5. BEING SO RESTLESS THAT IT IS HARD TO SIT STILL: NOT AT ALL
GAD7 TOTAL SCORE: 8
6. BECOMING EASILY ANNOYED OR IRRITABLE: SEVERAL DAYS
8. IF YOU CHECKED OFF ANY PROBLEMS, HOW DIFFICULT HAVE THESE MADE IT FOR YOU TO DO YOUR WORK, TAKE CARE OF THINGS AT HOME, OR GET ALONG WITH OTHER PEOPLE?: VERY DIFFICULT
7. FEELING AFRAID AS IF SOMETHING AWFUL MIGHT HAPPEN: NOT AT ALL
7. FEELING AFRAID AS IF SOMETHING AWFUL MIGHT HAPPEN: NOT AT ALL
1. FEELING NERVOUS, ANXIOUS, OR ON EDGE: MORE THAN HALF THE DAYS
4. TROUBLE RELAXING: MORE THAN HALF THE DAYS
3. WORRYING TOO MUCH ABOUT DIFFERENT THINGS: SEVERAL DAYS
2. NOT BEING ABLE TO STOP OR CONTROL WORRYING: MORE THAN HALF THE DAYS
IF YOU CHECKED OFF ANY PROBLEMS ON THIS QUESTIONNAIRE, HOW DIFFICULT HAVE THESE PROBLEMS MADE IT FOR YOU TO DO YOUR WORK, TAKE CARE OF THINGS AT HOME, OR GET ALONG WITH OTHER PEOPLE: VERY DIFFICULT

## 2024-07-30 ASSESSMENT — PAIN SCALES - GENERAL: PAINLEVEL: NO PAIN (0)

## 2024-07-30 ASSESSMENT — PATIENT HEALTH QUESTIONNAIRE - PHQ9
SUM OF ALL RESPONSES TO PHQ QUESTIONS 1-9: 4
10. IF YOU CHECKED OFF ANY PROBLEMS, HOW DIFFICULT HAVE THESE PROBLEMS MADE IT FOR YOU TO DO YOUR WORK, TAKE CARE OF THINGS AT HOME, OR GET ALONG WITH OTHER PEOPLE: NOT DIFFICULT AT ALL
SUM OF ALL RESPONSES TO PHQ QUESTIONS 1-9: 4

## 2024-07-30 NOTE — PROGRESS NOTES
Virtual Visit Details    Type of service:  Video Visit     Originating Location (pt. Location): Home    Distant Location (provider location):  On-site  Platform used for Video Visit: [facility]  Psychiatry Clinic  PSYCHIATRY PROGRESS NOTE     CARE TEAM:  PCP- No Ref-Primary, Physician   [place]- at [facility] [place] .  Viktoria is a 39 year old who prefers the name Viktoria and uses pronouns she, her.     DIAGNOSES                                                                                        Major Depressive Disorder with Anxious Distress  Insomnia  ADHD  ASD    ASSESSMENT                                                                                          Viktoria Guillen presented at intake with symptoms supporting a diagnosis of MAJOR DEPRESSIVE DISORDER with anxious distress. While she initially felt some improvement on Wellbutrin  mg, she subsequently experienced significant increase in depressed mood starting in June 2023. Viktoria pursued a referral to [facility] for ketamine treatment for depression. She reported a significant response in terms of mood and physical GI distress symptoms. Anxiety and insomnia continued to be an issue and possibly worsened after starting ketamine, but has since improved with the addition of ramelteon and eszopiclone. She did not notice much improvement with gabapentin 300 mg for additional support for nighttime anxiety and insomnia, so discontinued this. Tapered off Wellbutrin, which initially improved anxiety and insomnia. Recently completed [employer] testing.    Spring 2024: completed psychological testing with Larry Castillo which confirmed diagnoses of ASD, ADHD.     February 2024: started stimulant treatment for ADHD. Viktoria meets six inattentive criteria for diagnosis. Clear evidence symptoms started around age 10. ADHD symptoms are impacting her professionally (to the degree that she is constantly fearful she will be fired/ boss has expressed  this), also negatively impact her home life, relationship, and ability to manage responsibilities. Viktoria was on stimulant medication for fatigue during college, and felt this was the time in her life when her mental health was most stable. Also reports excelling in college while on stimulant medications.     [employer] test results predict no gene-drug interaction for dexmethylphenidate (Focalin) or methylphenidate (Ritalin, Concerta). Moderate gene-drug interaction is predicted for atomoxetine (Strattera).    Viktoria felt propranolol was no longer as helpful as it used to be. Discontinued and switch to clonidine 0.1 mg at bedtime. In June, increased to 0.2 mg, which was helpful. Continue amitriptyline, eszopiclone, and ramelteon. Goal of eventually discontinuing eszopiclone.   Started Metadate CD 10 mg at the end of February 2024. Viktoria reports this has been very helpful for managing ADHD symptoms. Anxiety initially worse, but then leveled out. Sleep and mood have improved since starting Metadate.   In March trial of 20 mg dose. Also started Biotene Lozenges for dry mouth associated with Metadate. Considered trialing a new stimulant, but Viktoria preferred to stay with Metadate for now.   End of May 2024, increased Metadate to 30 mg.  Today, no medication changes made. Viktoria reports quitting her job a few weeks ago. Actively looking for a new job. Also exploring how to get on partner's insurance. All meds refilled today. May be without health insurance for a few months.   Follow-up when coverage resumes.      [place] was checked today:  Indicates taking controlled medication as prescribed.    PLAN                                                                                                       1) Meds    CONTINUE Amitriptyline 50 mg daily at bedtime for depression    CONTINUE  Clonidine to 0.2 mg at bedtime -Please send recent BP via [employer] prior to starting new dose.    CONTINUE Eszopiclone (Lunesta)  3 mg  CONTINUE  "Ramelteon (Rozerem) 8 mg     CONTINUE Metadate CD to 30 mg daily in the morning    CONTINUE Biotene Lozenges for Metadate associated dry mouth as needed three times daily     2) Other: None today.    3) RTC: When health insurance coverage resumes.     4) Crisis numbers in AVS.     CHIEF COMPLAINT                                      \" Following up \"     PERTINENT BACKGROUND                                         [initial eval 02/16/23]     History of depressive episodes starting at age 14. She reports many past medication trials, as well as a long history of working with a psychotherapist. She has had several episodes of suicidal ideation over the years, with the most significant occurring in 2015. At that time, she had no attempts or concrete plan, but had daily intrusive SI thoughts. In 2013 she had one episode of self-injury that involved cutting her wrist with a steak knife. She reports many episodes of depression over the past 23 years. She also reports a history of anxiety.     Psych pertinent item history includes SIB     HISTORY OF PRESENT ILLNESS                                                      Since the last visit, Viktoria reports she ended up quitting her job. She did have a job interview this week and continues the job hunt.   Her last day was a little over a month ago.   Depression has been stable since quitting her job, but she notes some decrease in self-esteem/shame for not \"trying harder.\"   Anxiety has been increased.  Partner continues to be supportive and patient.   Sleep is ok. Some difficulty with sleep onset due to rumination, but sleep maintenance has been ok.   Has not had a ketamine treatment in over three months.   Appetite has been fine.  Energy baseline.   ADHD still feels well managed. Able to concentrate on job hunt.     Denies SI.     Adverse Med Effects:  See table below  Pertinent Negatives:  No   Recent Substance Use:    None reported    SOCIAL and FAMILY HISTORY                  " "                               per pt report         Family Hx:  Not obtained at this visit.     Social Hx:  Social Support - good social support from girlfriend \"great relationship\"    PAST PSYCH and SUBSTANCE USE HISTORY                      Psych:  Suicidal ideation - Past, none currently   SIB - One prior episode 2013, none currently      Substance Use:  No additional substance use history.    MEDICAL HISTORY     Patient Active Problem List   Diagnosis    Depression with anxiety    Chronic idiopathic constipation    ADD (attention deficit disorder)    Dyspepsia    Essential hypertension    Gastroparesis    GERD (gastroesophageal reflux disease)    Insomnia    Sleep disorder    Social anxiety disorder    Spastic colon    Suicidal ideation    Depression, unspecified depression type    Encounter for pharmacogenetic testing       [place] Provider: Alexandra (not PCP).    ALLERGIES: Ginger and Citalopram     MEDICAL REVIEW OF SYSTEMS                                                                  none in addition to that documented above    CURRENT MEDS       Current Outpatient Medications   Medication Sig Dispense Refill    amitriptyline (ELAVIL) 50 MG tablet Take 1 tablet (50 mg) by mouth at bedtime 30 tablet 3    Artificial Saliva (BIOTENE DRY MOUTH) LOZG Take 1 lozenge by mouth 3 times daily as needed (For medication induced dry mouth.) 81 lozenge 3    cloNIDine (CATAPRES) 0.2 MG tablet Take 1 tablet (0.2 mg) by mouth at bedtime 30 tablet 3    docusate sodium (DSS) 250 MG capsule Take 250 mg by mouth daily as needed      eszopiclone (LUNESTA) 3 MG tablet Take 1 tablet (3 mg) by mouth as needed for sleep 30 tablet 3    gabapentin (NEURONTIN) 100 MG capsule Take 1 capsule (100 mg) by mouth 3 times daily 90 capsule 3    [START ON 8/23/2024] methylphenidate (METADATE CD) 30 MG CR capsule Take 1 capsule (30 mg) by mouth every morning 30 capsule 0    [START ON 9/20/2024] methylphenidate (METADATE CD) 30 MG CR capsule Take " 1 capsule (30 mg) by mouth every morning 30 capsule 0    [START ON 10/18/2024] methylphenidate (METADATE CD) 30 MG CR capsule Take 1 capsule (30 mg) by mouth every morning 30 capsule 0    ondansetron (ZOFRAN ODT) 4 MG ODT tab Take 4 mg by mouth every 6 hours as needed for nausea      ramelteon (ROZEREM) 8 MG tablet Take 1 tablet (8 mg) by mouth at bedtime Do not take with a high fat meal. 30 tablet 3    simethicone (MYLICON) 125 MG chewable tablet Take 125 mg by mouth 4 times daily as needed for intestinal gas          Psychotropic Medication Trials      Medication Max Dose (mg) Dates / Duration Helpful? DC Reason / Adverse Effects?   Zoloft (sertraline) Unknown Unknown No None   Paxil (paroxetine) Unknown Unknown No Cause SI at the age of 14   Celexa (citalopram) Unknown Unknown No Full body rash/hives in patient's 20s    Lexapro (escitalopram) Unknown Unknown No None   Wellbutrin  (bupropion) 100mg IR Unknown Yes None   Remeron (mirtazapine) Unknown Unknown Yes Weight gain of 30 pounds in one month   Viibryd (vilazodone) Unknown Unknown Yes Reported long withdrawal    Elavil (amitriptyline) 25-50mg Unknown Yes Somewhat helpful at 25mg; described a strange sensation of mental fog/slowing, but not sedation at 50mg   Abilify (aripriprazole) Unknown Unknown No Reports sedation     Neurontin (gabapentin) 300mg Unknown Unsure Unsure   Inderal (propranolol) 10mg Unknown Yes None   Ambien (zolpidem) 10mg Unknown Yes None   Melatonin Unknown Unknown No None   Xanax (alprazolam) Unknown Unknown Yes None   Vistaril / Atarax (hydroxyzine) 50mg Unknown No Used for sleep, cause some drowsiness, but not enough for sleep onset       VITALS                                                                                              There were no vitals taken for this visit.   MENTAL STATUS EXAM                                                             Alertness: alert   Appearance: adequately groomed  Behavior/Demeanor:  cooperative, with good  eye contact   Speech: regular rate and rhythm  Language: intact  Psychomotor: normal or unremarkable  Mood: description consistent with euthymia  Affect: full range; congruent to: mood- yes, content- yes  Thought Process/Associations: unremarkable  Thought Content:  Reports none;  Denies suicidal & violent ideation and delusions  Perception:  Reports none;  Denies hallucinations  Insight: adequate  Judgment: appropriate  Cognition: does  appear grossly intact; formal cognitive testing was not done  oriented: time, person, and place  attention span: intact  concentration: intact  recent memory: intact  remote memory: intact  fund of knowledge: appropriate  Gait and Station: N/A (telehealth)    LABS and DATA         3/28/2024     7:12 AM 5/30/2024    12:24 PM 7/30/2024     9:50 AM   PHQ   PHQ-9 Total Score 3 4 4   Q9: Thoughts of better off dead/self-harm past 2 weeks Not at all Not at all Not at all       PSYCHOTROPIC DRUG INTERACTIONS   Drug-Drug: cloNIDine and amitriptylineThe antihypertensive effects of Clonidine may be decreased by Tricyclic Antidepressants. Tricyclic Antidepressants may worsen rebound reactions from abrupt Clonidine withdrawal.    MANAGEMENT:  routine monitoring    RISK STATEMENT for SAFETY   Viktoria did not appear to be an imminent safety risk to self or others.    TREATMENT RISK STATEMENT:  The risks, benefits, alternatives and potential adverse effects have been discussed and are understood by the pt. The pt understands the risks of using street drugs or alcohol. There are no medical contraindications, the pt agrees to treatment with the ability to do so. The pt knows to call the clinic for any problems or to access emergency care if needed.  Medical and substance use concerns are documented above.  Psychotropic drug interaction check was done, including changes made today.    PROVIDER:  BENITO Cho CNP       MEDICAL DECISION MAKING        ([facility]  .[email or web address])     Level of Medical Decision Making:   - At least 1 chronic problem that is not stable  - Engaged in prescription drug management during visit (discussed any medication benefits, side effects, alternatives, etc.)      The longitudinal plan of care for the diagnosis(es)/condition(s) as documented were addressed during this visit. Due to the added complexity in care, I will continue to support Viktoria in the subsequent management and with ongoing continuity of care.     Answers submitted by the patient for this visit:  Patient Health Questionnaire (Submitted on 7/30/2024)  If you checked off any problems, how difficult have these problems made it for you to do your work, take care of things at home, or get along with other people?: Not difficult at all  PHQ9 TOTAL SCORE: 4  LUBNA-7 (Submitted on 7/30/2024)  LUBNA 7 TOTAL SCORE: 8

## 2024-07-30 NOTE — PATIENT INSTRUCTIONS
PLAN                                                                                                       1) Meds    CONTINUE Amitriptyline 50 mg daily at bedtime for depression    CONTINUE  Clonidine to 0.2 mg at bedtime -Please send recent BP via [employer] prior to starting new dose.    CONTINUE Eszopiclone (Lunesta)  3 mg  CONTINUE Ramelteon (Rozerem) 8 mg     CONTINUE Metadate CD to 30 mg daily in the morning    CONTINUE Biotene Lozenges for Metadate associated dry mouth as needed three times daily     2) Other: None today.    3) RTC: When health insurance coverage resumes.     4) Crisis numbers in AVS.    **For crisis resources, please see the information at the end of this document**     Patient Education      Thank you for coming to the [facility] [place] [facility].     Lab Testing:  If you had lab testing today and your results are reassuring or normal they will be mailed to you or sent through [employer] within 7 days. If the lab tests need quick action we will call you with the results. The phone number we will call with results is # [phone]. If this is not the best number please call our clinic and change the number.     Medication Refills:  If you need any refills please call your pharmacy and they will contact us. Our fax number for refills is [phone].   Three business days of notice are needed for general medication refill requests.   Five business days of notice are needed for controlled substance refill requests.   If you need to change to a different pharmacy, please contact the new pharmacy directly. The new pharmacy will help you get your medications transferred.     Contact Us:  Please call [phone] during business hours (8-5:00 M-F).   If you have medication related questions after clinic hours, or on the weekend, please call [phone].     Financial Assistance [phone]   Medical Records [phone]       MENTAL HEALTH CRISIS RESOURCES:  For a  emergency help, please call 911 or go to the nearest Emergency Department.     Emergency Walk-In Options:   EmPATH Unit @ [facility] ([place]): [phone] - Specialized mental health emergency area designed to be calming  [facility] ([place]): [phone]  [facility] ([place]): [phone]  [facility]): [phone]    County Crisis Information:   [place]: [phone]  [place]: [phone]  [place] (COPE) - Adult: [phone]     Child: [phone]  [place] - Adult: [phone]     Child: [phone]  [place]: [phone]  List of all [facility] resources:   https://[place].[facility]/dhs/people-we-serve/adults/health-care/mental-health/resources/crisis-contacts.jsp    National Crisis Information:   Crisis Text Line: Text  [place]  to 258444  Suicide & Crisis Lifeline: 988  National Suicide Prevention Lifeline: 8-970-014-TALK (1-976.364.4367)       For online chat options, visit https://suicidepreventionlifeline.org/chat/  Poison Control Center: 1-984.693.5863  Trans Lifeline: 1-169.685.8217 - Hotline for transgender people of all ages  The [employer] Project: 8-304-816-8524 - Hotline for LGBT youth     For Non-Emergency Support:   Fast Tracker: Mental Health & Substance Use Disorder Resources -   https://www.[employer].org/

## 2024-07-30 NOTE — NURSING NOTE
Current patient location: [address]  [facility] [address]    Is the patient currently in the state of [place]? YES    Visit mode:VIDEO    If the visit is dropped, the patient can be reconnected by: VIDEO VISIT: Text to cell phone:   Telephone Information:   Mobile [phone]       Will anyone else be joining the visit? NO  (If patient encounters technical issues they should call [phone]    How would you like to obtain your AVS? MyChart    Are changes needed to the allergy or medication list? Yes please see meds flagged for removal:   -methylphenidate (METADATE CD) 10 MG CR capsule (change to 30mg tab)  -methylphenidate (METADATE CD) 20 MG CR capsule (change to 30mg tab)  -methylphenidate (METADATE CD) 30 MG CR capsule (duplicate)  -pantoprazole (PROTONIX) 40 MG EC tablet (no longer taking)    Are refills needed on medications prescribed by this physician? NO    Reason for visit: RECHECK    Patient prefers to complete questionnaires in MyChart rather than over the phone. Pt stated they will complete them prior to joining the video.    Mindi MICHELLEF

## 2024-08-21 ENCOUNTER — MYC MEDICAL ADVICE (OUTPATIENT)
Dept: PSYCHIATRY | Facility: CLINIC | Age: 39
End: 2024-08-21
Payer: COMMERCIAL

## 2024-08-21 DIAGNOSIS — F90.0 ATTENTION DEFICIT HYPERACTIVITY DISORDER (ADHD), PREDOMINANTLY INATTENTIVE TYPE: ICD-10-CM

## 2024-08-22 RX ORDER — METHYLPHENIDATE HYDROCHLORIDE 30 MG/1
30 CAPSULE, EXTENDED RELEASE ORAL EVERY MORNING
Qty: 30 CAPSULE | Refills: 0 | Status: SHIPPED | OUTPATIENT
Start: 2024-08-24

## 2024-09-20 DIAGNOSIS — F90.0 ATTENTION DEFICIT HYPERACTIVITY DISORDER (ADHD), PREDOMINANTLY INATTENTIVE TYPE: ICD-10-CM

## 2024-09-24 ENCOUNTER — MYC MEDICAL ADVICE (OUTPATIENT)
Dept: PSYCHIATRY | Facility: CLINIC | Age: 39
End: 2024-09-24
Payer: COMMERCIAL

## 2024-09-24 DIAGNOSIS — F90.0 ATTENTION DEFICIT HYPERACTIVITY DISORDER (ADHD), PREDOMINANTLY INATTENTIVE TYPE: ICD-10-CM

## 2024-09-24 RX ORDER — METHYLPHENIDATE HYDROCHLORIDE 30 MG/1
30 CAPSULE, EXTENDED RELEASE ORAL EVERY MORNING
Qty: 30 CAPSULE | Refills: 0 | Status: SHIPPED | OUTPATIENT
Start: 2024-09-24

## 2024-10-22 DIAGNOSIS — F90.0 ATTENTION DEFICIT HYPERACTIVITY DISORDER (ADHD), PREDOMINANTLY INATTENTIVE TYPE: ICD-10-CM

## 2024-11-14 ENCOUNTER — VIRTUAL VISIT (OUTPATIENT)
Dept: PSYCHIATRY | Facility: CLINIC | Age: 39
End: 2024-11-14
Attending: PSYCHIATRY & NEUROLOGY
Payer: COMMERCIAL

## 2024-11-14 DIAGNOSIS — F33.9 MAJOR DEPRESSIVE DISORDER, RECURRENT EPISODE WITH ANXIOUS DISTRESS (H): ICD-10-CM

## 2024-11-14 DIAGNOSIS — F99 INSOMNIA DUE TO OTHER MENTAL DISORDER: ICD-10-CM

## 2024-11-14 DIAGNOSIS — F90.0 ATTENTION DEFICIT HYPERACTIVITY DISORDER (ADHD), PREDOMINANTLY INATTENTIVE TYPE: ICD-10-CM

## 2024-11-14 DIAGNOSIS — F51.05 INSOMNIA DUE TO OTHER MENTAL DISORDER: ICD-10-CM

## 2024-11-14 RX ORDER — AMITRIPTYLINE HYDROCHLORIDE 50 MG/1
50 TABLET ORAL AT BEDTIME
Qty: 30 TABLET | Refills: 3 | Status: SHIPPED | OUTPATIENT
Start: 2024-11-14

## 2024-11-14 RX ORDER — CLONIDINE HYDROCHLORIDE 0.2 MG/1
0.2 TABLET ORAL AT BEDTIME
Qty: 30 TABLET | Refills: 3 | Status: SHIPPED | OUTPATIENT
Start: 2024-11-14

## 2024-11-14 RX ORDER — METHYLPHENIDATE HYDROCHLORIDE 40 MG/1
40 CAPSULE, EXTENDED RELEASE ORAL DAILY
Qty: 30 CAPSULE | Refills: 0 | Status: SHIPPED | OUTPATIENT
Start: 2025-01-13 | End: 2025-02-12

## 2024-11-14 RX ORDER — ESZOPICLONE 3 MG/1
3 TABLET, FILM COATED ORAL PRN
Qty: 30 TABLET | Refills: 3 | Status: SHIPPED | OUTPATIENT
Start: 2024-11-14

## 2024-11-14 RX ORDER — METHYLPHENIDATE HYDROCHLORIDE 40 MG/1
40 CAPSULE, EXTENDED RELEASE ORAL DAILY
Qty: 30 CAPSULE | Refills: 0 | Status: SHIPPED | OUTPATIENT
Start: 2024-11-14 | End: 2024-12-14

## 2024-11-14 RX ORDER — GABAPENTIN 100 MG/1
100 CAPSULE ORAL 3 TIMES DAILY
Qty: 90 CAPSULE | Refills: 3 | Status: SHIPPED | OUTPATIENT
Start: 2024-11-14

## 2024-11-14 RX ORDER — METHYLPHENIDATE HYDROCHLORIDE 40 MG/1
40 CAPSULE, EXTENDED RELEASE ORAL DAILY
Qty: 30 CAPSULE | Refills: 0 | Status: SHIPPED | OUTPATIENT
Start: 2024-12-14 | End: 2025-01-13

## 2024-11-14 RX ORDER — RAMELTEON 8 MG/1
8 TABLET ORAL AT BEDTIME
Qty: 30 TABLET | Refills: 3 | Status: SHIPPED | OUTPATIENT
Start: 2024-11-14

## 2024-11-14 ASSESSMENT — PATIENT HEALTH QUESTIONNAIRE - PHQ9
SUM OF ALL RESPONSES TO PHQ QUESTIONS 1-9: 4
SUM OF ALL RESPONSES TO PHQ QUESTIONS 1-9: 4
10. IF YOU CHECKED OFF ANY PROBLEMS, HOW DIFFICULT HAVE THESE PROBLEMS MADE IT FOR YOU TO DO YOUR WORK, TAKE CARE OF THINGS AT HOME, OR GET ALONG WITH OTHER PEOPLE: NOT DIFFICULT AT ALL

## 2024-11-14 NOTE — NURSING NOTE
Current patient location: [address] [facility] [address]    Is the patient currently in the state of [place]? YES    Visit mode:VIDEO    If the visit is dropped, the patient can be reconnected by:VIDEO VISIT: Text to cell phone:   Telephone Information:   Mobile [phone]       Will anyone else be joining the visit? NO  (If patient encounters technical issues they should call [phone]    Are changes needed to the allergy or medication list? Pt stated no changes to allergies and Pt stated no med changes    Are refills needed on medications prescribed by this physician? Discuss with provider    Rooming Documentation:  Questionnaire(s) completed    Reason for visit: RECHANGELA MICHELLEF

## 2024-11-14 NOTE — PATIENT INSTRUCTIONS
PLAN                                                                                                       1) Meds    CONTINUE Amitriptyline 50 mg daily at bedtime for depression    CONTINUE  Clonidine to 0.2 mg at bedtime -Please send recent BP via [employer].    CONTINUE Eszopiclone (Lunesta)  3 mg    CONTINUE Ramelteon (Rozerem) 8 mg     CONTINUE Gabapentin 100 mg three times daily as needed for anxiety and insomnia     INCREASE Metadate CD to 40 mg daily in the morning    CONTINUE Biotene Lozenges for Metadate associated dry mouth as needed three times daily     2) Other: None today.    3) RTC: 3 months     4) Crisis numbers in AVS.    **For crisis resources, please see the information at the end of this document**     Patient Education      Thank you for coming to the [facility] [place] [facility].     Lab Testing:  If you had lab testing today and your results are reassuring or normal they will be mailed to you or sent through [employer] within 7 days. If the lab tests need quick action we will call you with the results. The phone number we will call with results is # [phone]. If this is not the best number please call our clinic and change the number.     Medication Refills:  If you need any refills please call your pharmacy and they will contact us. Our fax number for refills is [phone].   Three business days of notice are needed for general medication refill requests.   Five business days of notice are needed for controlled substance refill requests.   If you need to change to a different pharmacy, please contact the new pharmacy directly. The new pharmacy will help you get your medications transferred.     Contact Us:  Please call [phone] during business hours (8-5:00 M-F).   If you have medication related questions after clinic hours, or on the weekend, please call [phone].     Financial Assistance [phone]   Medical Records [phone]       MENTAL  HEALTH CRISIS RESOURCES:  For a emergency help, please call 911 or go to the nearest Emergency Department.     Emergency Walk-In Options:   EmPATH Unit @ [facility] [facility] ([place]): [phone] - Specialized mental health emergency area designed to be calming   [facility] ([place]): [phone]  [facility] ([place]): [phone]  [facility]): [phone]    [facility] Crisis Information:   [place]: [phone]  [place]: [phone]  [place] (COPE) - Adult: [phone]     Child: [phone]  [place] - Adult: [phone]     Child: [phone]  [place]: [phone]  List of all [facility] resources:   https://[place].[facility]/dhs/people-we-serve/adults/health-care/mental-health/resources/crisis-contacts.jsp    National Crisis Information:   Crisis Text Line: Text  [place]  to 514944  Suicide & Crisis Lifeline: 988  National Suicide Prevention Lifeline: 9-071-219-TALK (1-863.543.8377)       For online chat options, visit https://suicidepreventionlifeline.org/chat/  Poison Control Center: 4-457-450-8431  Trans Lifeline: 1-252.971.9645 - Hotline for transgender people of all ages  The Pacheco Project: 7-548-448-0917 - Hotline for LGBT youth     For Non-Emergency Support:   Fast Tracker: Mental Health & Substance Use Disorder Resources -   https://www.[employer].org/

## 2024-11-14 NOTE — PROGRESS NOTES
Virtual Visit Details    Type of service:  Video Visit     Originating Location (pt. Location): Home    Distant Location (provider location):  Off-site  Platform used for Video Visit: [facility]  PSYCHIATRY PROGRESS NOTE     CARE TEAM:  PCP- No Ref-Primary, Physician   GI- at [facility] .  Viktoria is a 39 year old who prefers the name Viktoria and uses pronouns she, her.     DIAGNOSES                                                                                        Major Depressive Disorder with Anxious Distress  Insomnia  ADHD  ASD    ASSESSMENT                                                                                          Viktoria Guillen presented at intake with symptoms supporting a diagnosis of MAJOR DEPRESSIVE DISORDER with anxious distress. While she initially felt some improvement on Wellbutrin  mg, she subsequently experienced significant increase in depressed mood starting in June 2023. Viktoria pursued a referral to [facility] for ketamine treatment for depression. She reported a significant response in terms of mood and physical GI distress symptoms. Anxiety and insomnia continued to be an issue and possibly worsened after starting ketamine, but has since improved with the addition of ramelteon and eszopiclone. Tapered off Wellbutrin, which initially improved anxiety and insomnia. Recently completed "[employer] testing.    Spring 2024: completed psychological testing with Larry Castillo which confirmed diagnoses of ASD, ADHD.     February 2024: started stimulant treatment for ADHD. Viktoria meets six inattentive criteria for diagnosis. Clear evidence symptoms started around age 10. ADHD symptoms are impacting her professionally (to the degree that she is constantly fearful she will be fired/ boss has expressed this), also negatively impact her home life, relationship, and ability to manage responsibilities. Viktoria was on stimulant medication for  fatigue during college, and felt this was the time in her life when her mental health was most stable. Also reports excelling in college while on stimulant medications.     [employer] test results predict no gene-drug interaction for dexmethylphenidate (Focalin) or methylphenidate (Ritalin, Concerta). Moderate gene-drug interaction is predicted for atomoxetine (Strattera).    Viktoria felt propranolol was no longer as helpful as it used to be. Discontinued and switch to clonidine 0.1 mg at bedtime. In June, increased to 0.2 mg, which was helpful. Continue amitriptyline, eszopiclone, and ramelteon. Goal of eventually discontinuing eszopiclone.   Started Metadate CD 10 mg at the end of February 2024. Viktoria reports this has been very helpful for managing ADHD symptoms. Anxiety initially worse, but then leveled out. Sleep and mood have improved since starting Metadate.   In March trial of 20 mg dose. Also started Biotene Lozenges for dry mouth associated with Metadate. Considered trialing a new stimulant, but Viktoria preferred to stay with Metadate for now.   End of May 2024, increased Metadate to 30 mg. November 2024 increased Metadate to 40 mg after starting new job and struggling more with ADHD symptoms.     Today, only medication change in increase in Metadate. Continue amitriptyline 50 mg, clonidine 0.2 mg, eszopiclone 3 mg, ramelteon 8 mg, and as needed gabapentin 100 mg three times daily.   Viktoria is due for a blood pressure check before next visit or schedule next visit to be in person.     Follow-up in 3 months.     MNPMP was checked today:  Indicates taking controlled medication as prescribed.    PLAN                                                                                                       1) Meds    CONTINUE Amitriptyline 50 mg daily at bedtime for depression    CONTINUE  Clonidine to 0.2 mg at bedtime -Please send recent BP via Sara Campbell.    CONTINUE Eszopiclone (Lunesta)  3 mg  CONTINUE Ramelteon (Rozerem) 8  "mg     CONTINUE Gabapentin 100 mg three times daily as needed for anxiety and insomnia     INCREASE Metadate CD to 40 mg daily in the morning    CONTINUE Biotene Lozenges for Metadate associated dry mouth as needed three times daily     2) Other: None today.    3) RTC: 3 months     4) Crisis numbers in AVS.     CHIEF COMPLAINT                                      \" Following up \"     PERTINENT BACKGROUND                                         [initial eval 02/16/23]     History of depressive episodes starting at age 14. She reports many past medication trials, as well as a long history of working with a psychotherapist. She has had several episodes of suicidal ideation over the years, with the most significant occurring in 2015. At that time, she had no attempts or concrete plan, but had daily intrusive SI thoughts. In 2013 she had one episode of self-injury that involved cutting her wrist with a steak knife. She reports many episodes of depression over the past 23 years. She also reports a history of anxiety.     Psych pertinent item history includes SIB     HISTORY OF PRESENT ILLNESS                                                      Since the last visit, Viktoria reports she got  and started a new job.   Working as a book keeper now.   Got  at the [email or web address] with a small gathering of family.     Mood = good, \"no issues with depression\"  Anxiety = manageable  Sleep = stopped Ramelteon when she lost insurance and sleep subsequently got worse; overall sleep is better since leaving her job at the lab. Continues to utilize Lunesta. May take up to one hour to fall asleep, though getting 8-9 hours of sleep per night.      Has not had a ketamine treatment since April. Thinking of scheduling a consult before winter. Worried about worsening depression in the winter months.     Appetite has been good. Has gained some weight. Struggling with constipation.   Energy baseline/ good.     ADHD feels less well " "managed. Notes some difficulty with executive functioning and motivation. Notes difficultly with attention span at new job. Difficulty completing non-preferred tasks.     Denies SI.     Adverse Med Effects:  See table below  Pertinent Negatives:  No   Recent Substance Use:    None reported    SOCIAL and FAMILY HISTORY                                                 per pt report         Family Hx:  Not obtained at this visit.     Social Hx:  Social Support - good social support from girlfriend \"great relationship\"    PAST PSYCH and SUBSTANCE USE HISTORY                      Psych:  Suicidal ideation - Past, none currently   SIB - One prior episode 2013, none currently      Substance Use:  No additional substance use history.    MEDICAL HISTORY     Patient Active Problem List   Diagnosis    Depression with anxiety    Chronic idiopathic constipation    ADD (attention deficit disorder)    Dyspepsia    Essential hypertension    Gastroparesis    GERD (gastroesophageal reflux disease)    Insomnia    Sleep disorder    Social anxiety disorder    Spastic colon    Suicidal ideation    Depression, unspecified depression type    Encounter for pharmacogenetic testing       [place] Provider: Alexandra (not PCP).    ALLERGIES: Ginger and Citalopram     MEDICAL REVIEW OF SYSTEMS                                                                  none in addition to that documented above    CURRENT MEDS       Current Outpatient Medications   Medication Sig Dispense Refill    amitriptyline (ELAVIL) 50 MG tablet Take 1 tablet (50 mg) by mouth at bedtime. 30 tablet 3    Artificial Saliva (BIOTENE DRY MOUTH) LOZG Take 1 lozenge by mouth 3 times daily as needed (For medication induced dry mouth.). 81 lozenge 3    cloNIDine (CATAPRES) 0.2 MG tablet Take 1 tablet (0.2 mg) by mouth at bedtime. 30 tablet 3    eszopiclone (LUNESTA) 3 MG tablet Take 1 tablet (3 mg) by mouth as needed for sleep. 30 tablet 3    gabapentin (NEURONTIN) 100 MG capsule " Take 1 capsule (100 mg) by mouth 3 times daily. 90 capsule 3    methylphenidate (METADATE CD) 40 MG CR capsule Take 1 capsule (40 mg) by mouth daily. 30 capsule 0    [START ON 12/14/2024] methylphenidate (METADATE CD) 40 MG CR capsule Take 1 capsule (40 mg) by mouth daily. 30 capsule 0    [START ON 1/13/2025] methylphenidate (METADATE CD) 40 MG CR capsule Take 1 capsule (40 mg) by mouth daily. 30 capsule 0    ramelteon (ROZEREM) 8 MG tablet Take 1 tablet (8 mg) by mouth at bedtime. Do not take with a high fat meal. 30 tablet 3    docusate sodium (DSS) 250 MG capsule Take 250 mg by mouth daily as needed      ondansetron (ZOFRAN ODT) 4 MG ODT tab Take 4 mg by mouth every 6 hours as needed for nausea      simethicone (MYLICON) 125 MG chewable tablet Take 125 mg by mouth 4 times daily as needed for intestinal gas          Psychotropic Medication Trials      Medication Max Dose (mg) Dates / Duration Helpful? DC Reason / Adverse Effects?   Zoloft (sertraline) Unknown Unknown No None   Paxil (paroxetine) Unknown Unknown No Cause SI at the age of 14   Celexa (citalopram) Unknown Unknown No Full body rash/hives in patient's 20s    Lexapro (escitalopram) Unknown Unknown No None   Wellbutrin  (bupropion) 100mg IR Unknown Yes None   Remeron (mirtazapine) Unknown Unknown Yes Weight gain of 30 pounds in one month   Viibryd (vilazodone) Unknown Unknown Yes Reported long withdrawal    Elavil (amitriptyline) 25-50mg Unknown Yes Somewhat helpful at 25mg; described a strange sensation of mental fog/slowing, but not sedation at 50mg   Abilify (aripriprazole) Unknown Unknown No Reports sedation     Neurontin (gabapentin) 300mg Unknown Unsure Unsure   Inderal (propranolol) 10mg Unknown Yes None   Ambien (zolpidem) 10mg Unknown Yes None   Melatonin Unknown Unknown No None   Xanax (alprazolam) Unknown Unknown Yes None   Vistaril / Atarax (hydroxyzine) 50mg Unknown No Used for sleep, cause some drowsiness, but not enough for sleep onset        VITALS                                                                                              There were no vitals taken for this visit.   MENTAL STATUS EXAM                                                             Alertness: alert   Appearance: adequately groomed  Behavior/Demeanor: cooperative, with good  eye contact   Speech: regular rate and rhythm  Language: intact  Psychomotor: normal or unremarkable  Mood: description consistent with euthymia  Affect: full range; congruent to: mood- yes, content- yes  Thought Process/Associations: unremarkable  Thought Content:  Reports none;  Denies suicidal & violent ideation and delusions  Perception:  Reports none;  Denies hallucinations  Insight: adequate  Judgment: appropriate  Cognition: does  appear grossly intact; formal cognitive testing was not done  oriented: time, person, and place  attention span: intact  concentration: intact  recent memory: intact  remote memory: intact  fund of knowledge: appropriate  Gait and Station: N/A (telehealth)    LABS and DATA         5/30/2024    12:24 PM 7/30/2024     9:50 AM 11/14/2024     7:36 AM   PHQ   PHQ-9 Total Score 4 4 4    Q9: Thoughts of better off dead/self-harm past 2 weeks Not at all  Not at all  Not at all        Patient-reported       PSYCHOTROPIC DRUG INTERACTIONS   Drug-Drug: cloNIDine and amitriptylineThe antihypertensive effects of Clonidine may be decreased by Tricyclic Antidepressants. Tricyclic Antidepressants may worsen rebound reactions from abrupt Clonidine withdrawal.    MANAGEMENT:  routine monitoring    RISK STATEMENT for SAFETY   Viktoria did not appear to be an imminent safety risk to self or others.    TREATMENT RISK STATEMENT:  The risks, benefits, alternatives and potential adverse effects have been discussed and are understood by the pt. The pt understands the risks of using street drugs or alcohol. There are no medical contraindications, the pt agrees to treatment with the ability to  do so. The pt knows to call the clinic for any problems or to access emergency care if needed.  Medical and substance use concerns are documented above.  Psychotropic drug interaction check was done, including changes made today.    PROVIDER:  BENITO Cho CNP       MEDICAL DECISION MAKING        (Daniel .PSYCHBILLMDM)     Level of Medical Decision Making:   - At least 1 chronic problem that is not stable  - Engaged in prescription drug management during visit (discussed any medication benefits, side effects, alternatives, etc.)      The longitudinal plan of care for the diagnosis(es)/condition(s) as documented were addressed during this visit. Due to the added complexity in care, I will continue to support Viktoria in the subsequent management and with ongoing continuity of care.

## 2024-12-19 RX ORDER — METHYLPHENIDATE HYDROCHLORIDE 20 MG/1
20 CAPSULE, EXTENDED RELEASE ORAL DAILY
Qty: 30 CAPSULE | Refills: 0 | OUTPATIENT
Start: 2024-12-19

## 2025-01-20 RX ORDER — METHYLPHENIDATE HYDROCHLORIDE 30 MG/1
30 CAPSULE, EXTENDED RELEASE ORAL EVERY MORNING
Qty: 30 CAPSULE | Refills: 0 | OUTPATIENT
Start: 2025-01-20

## 2025-02-18 ENCOUNTER — VIRTUAL VISIT (OUTPATIENT)
Dept: PSYCHIATRY | Facility: CLINIC | Age: 40
End: 2025-02-18
Attending: PSYCHIATRY & NEUROLOGY
Payer: COMMERCIAL

## 2025-02-18 DIAGNOSIS — F90.0 ATTENTION DEFICIT HYPERACTIVITY DISORDER (ADHD), PREDOMINANTLY INATTENTIVE TYPE: Primary | ICD-10-CM

## 2025-02-18 DIAGNOSIS — F99 INSOMNIA DUE TO OTHER MENTAL DISORDER: ICD-10-CM

## 2025-02-18 DIAGNOSIS — F51.05 INSOMNIA DUE TO OTHER MENTAL DISORDER: ICD-10-CM

## 2025-02-18 DIAGNOSIS — F33.9 MAJOR DEPRESSIVE DISORDER, RECURRENT EPISODE WITH ANXIOUS DISTRESS: ICD-10-CM

## 2025-02-18 PROCEDURE — G2211 COMPLEX E/M VISIT ADD ON: HCPCS | Mod: 95 | Performed by: PSYCHIATRY & NEUROLOGY

## 2025-02-18 PROCEDURE — 98006 SYNCH AUDIO-VIDEO EST MOD 30: CPT | Performed by: PSYCHIATRY & NEUROLOGY

## 2025-02-18 RX ORDER — METHYLPHENIDATE HYDROCHLORIDE 10 MG/1
10 CAPSULE, EXTENDED RELEASE ORAL EVERY MORNING
Qty: 30 CAPSULE | Refills: 0 | Status: SHIPPED | OUTPATIENT
Start: 2025-02-18

## 2025-02-18 RX ORDER — CLONIDINE HYDROCHLORIDE 0.2 MG/1
0.2 TABLET ORAL AT BEDTIME
Qty: 30 TABLET | Refills: 3 | Status: SHIPPED | OUTPATIENT
Start: 2025-02-18

## 2025-02-18 RX ORDER — ESZOPICLONE 3 MG/1
3 TABLET, FILM COATED ORAL PRN
Qty: 30 TABLET | Refills: 3 | Status: SHIPPED | OUTPATIENT
Start: 2025-02-18

## 2025-02-18 RX ORDER — METHYLPHENIDATE HYDROCHLORIDE 20 MG/1
40 CAPSULE, EXTENDED RELEASE ORAL
COMMUNITY
Start: 2024-04-22

## 2025-02-18 RX ORDER — METHYLPHENIDATE HYDROCHLORIDE 20 MG/1
40 CAPSULE, EXTENDED RELEASE ORAL EVERY MORNING
Qty: 60 CAPSULE | Refills: 0 | Status: SHIPPED | OUTPATIENT
Start: 2025-02-18

## 2025-02-18 RX ORDER — RAMELTEON 8 MG/1
8 TABLET ORAL AT BEDTIME
Qty: 30 TABLET | Refills: 3 | Status: SHIPPED | OUTPATIENT
Start: 2025-02-18

## 2025-02-18 RX ORDER — AMITRIPTYLINE HYDROCHLORIDE 50 MG/1
50 TABLET ORAL AT BEDTIME
Qty: 30 TABLET | Refills: 3 | Status: SHIPPED | OUTPATIENT
Start: 2025-02-18

## 2025-02-18 RX ORDER — GABAPENTIN 100 MG/1
100 CAPSULE ORAL 3 TIMES DAILY
Qty: 90 CAPSULE | Refills: 3 | Status: SHIPPED | OUTPATIENT
Start: 2025-02-18

## 2025-02-18 ASSESSMENT — PAIN SCALES - GENERAL: PAINLEVEL_OUTOF10: MILD PAIN (3)

## 2025-02-18 ASSESSMENT — PATIENT HEALTH QUESTIONNAIRE - PHQ9
SUM OF ALL RESPONSES TO PHQ QUESTIONS 1-9: 6
10. IF YOU CHECKED OFF ANY PROBLEMS, HOW DIFFICULT HAVE THESE PROBLEMS MADE IT FOR YOU TO DO YOUR WORK, TAKE CARE OF THINGS AT HOME, OR GET ALONG WITH OTHER PEOPLE: SOMEWHAT DIFFICULT
SUM OF ALL RESPONSES TO PHQ QUESTIONS 1-9: 6

## 2025-02-18 NOTE — PROGRESS NOTES
Virtual Visit Details    Type of service:  Video Visit     Originating Location (pt. Location): Home    Distant Location (provider location):  Off-site  Platform used for Video Visit: [facility]  Psychiatry Clinic  PSYCHIATRY PROGRESS NOTE     CARE TEAM:  PCP- No Ref-Primary, Physician   GI- at [facility] .  Viktoria is a 39 year old who prefers the name Viktoria and uses pronouns she, her.     DIAGNOSES                                                                                        Major Depressive Disorder with Anxious Distress  Insomnia  ADHD  ASD    ASSESSMENT                                                                                          Viktoria Guillen presented at intake with symptoms supporting a diagnosis of MAJOR DEPRESSIVE DISORDER with anxious distress. While she initially felt some improvement on Wellbutrin  mg, she subsequently experienced significant increase in depressed mood starting in June 2023. Viktoria pursued a referral to [facility] for ketamine treatment for depression. She reported a significant response in terms of mood and physical GI distress symptoms. Anxiety and insomnia continued to be an issue and possibly worsened after starting ketamine, but improved with the addition of ramelteon and eszopiclone. Tapered off Wellbutrin, which initially improved anxiety and insomnia. Recently completed [employer] testing.    Spring 2024: completed psychological testing with Larry Castillo which confirmed diagnoses of ASD, ADHD.     February 2024: started stimulant treatment for ADHD. Viktoria meets six inattentive criteria for diagnosis. Clear evidence symptoms started around age 10. ADHD symptoms are impacting her professionally (to the degree that she is constantly fearful she will be fired/ boss has expressed this), also negatively impact her home life, relationship, and ability to manage responsibilities. Viktoria was on stimulant medication for fatigue during  college, and felt this was the time in her life when her mental health was most stable. Also reports excelling in college while on stimulant medications.     [employer] test results predict no gene-drug interaction for dexmethylphenidate (Focalin) or methylphenidate (Ritalin, Concerta). Moderate gene-drug interaction is predicted for atomoxetine (Strattera).    Viktoria felt propranolol was no longer as helpful as it used to be. Discontinued and switch to clonidine 0.1 mg at bedtime. In June 2024, increased to 0.2 mg, which was helpful. Continue amitriptyline, eszopiclone, and ramelteon. Goal of eventually discontinuing eszopiclone.   Started Metadate CD 10 mg at the end of February 2024. Viktoria reports this has been very helpful for managing ADHD symptoms. Anxiety initially worse, but then leveled out. Sleep and mood have improved since starting Metadate.   In March trial of 20 mg dose. Also started Biotene Lozenges for dry mouth associated with Metadate. Considered trialing a new stimulant, but Viktoria preferred to stay with Metadate for now.   End of May 2024, increased Metadate to 30 mg. November 2024 increased Metadate to 40 mg after starting new job and struggling more with ADHD symptoms.     Today, mood, anxiety, insomnia well managed. ADHD somewhat challenging given change in work and work schedule. Th only medication change is increase in Metadate to trial 50 mg. Continue amitriptyline 50 mg, clonidine 0.2 mg, eszopiclone 3 mg, ramelteon 8 mg, and as needed gabapentin 100 mg three times daily.   Viktoria reported blood pressure of 121/74 at today's visit (2/18/25).     Follow-up in 3 months.     [place]NPMP was checked today:  Indicates taking controlled medication as prescribed.    PLAN                                                                                                       1) Meds    CONTINUE Amitriptyline 50 mg daily at bedtime for depression    CONTINUE  Clonidine to 0.2 mg at bedtime -Please send recent BP  "via MyChart.    CONTINUE Eszopiclone (Lunesta)  3 mg  CONTINUE Ramelteon (Rozerem) 8 mg     CONTINUE Gabapentin 100 mg three times daily as needed for anxiety and insomnia     INCREASE Metadate CD to 50 mg daily in the morning    CONTINUE Biotene Lozenges for Metadate associated dry mouth as needed three times daily     2) Other: None today.    3) RTC: 3 months     4) Crisis numbers in AVS.     CHIEF COMPLAINT                                      \" Following up \"     PERTINENT BACKGROUND                                         [initial eval 02/16/23]     History of depressive episodes starting at age 14. She reports many past medication trials, as well as a long history of working with a psychotherapist. She has had several episodes of suicidal ideation over the years, with the most significant occurring in 2015. At that time, she had no attempts or concrete plan, but had daily intrusive SI thoughts. In 2013 she had one episode of self-injury that involved cutting her wrist with a steak knife. She reports many episodes of depression over the past 23 years. She also reports a history of anxiety.     Psych pertinent item history includes SIB     HISTORY OF PRESENT ILLNESS                                                      Since the last visit, Viktroia reports she is doing well overall.   Finds work stressful and overwhelming. 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.)     Mood = \"ok, I guess,\" finding antonio in creative projects, reading, some lack of energy, \"not noticing a ton of depressive symptoms beyond this [work] is too much\"  Anxiety = manageable, but some overwhelm about work   Sleep = endorses difficulty with sleep onset (may take up to 90 minutes to fall asleep); denies difficulty with sleep maintenance; getting 7-9 hours per night    Appetite = fine, denies GI symptoms   Energy = \"a little low\"    ADHD = \"some days its fine, some days its not,\" notes become more " "forgetful when she's feeling overwhelmed; ADHD feels less managed when stress increases    Has not done any ketamine treatments since last appointment.   Taking gabapentin 200 mg at bedtime; occasionally takes 100 mg prior to anxiety-inducing social situations.     Denies SI.     Adverse Med Effects:  See table below  Pertinent Negatives:  No   Recent Substance Use:    None reported    SOCIAL and FAMILY HISTORY                                                 per pt report         Family Hx:  Not obtained at this visit.     Social Hx:  Social Support - good social support from girlfriend \"great relationship\"    PAST PSYCH and SUBSTANCE USE HISTORY                      Psych:  Suicidal ideation - Past, none currently   SIB - One prior episode 2013, none currently      Substance Use:  No additional substance use history.    MEDICAL HISTORY     Patient Active Problem List   Diagnosis    Depression with anxiety    Chronic idiopathic constipation    ADD (attention deficit disorder)    Dyspepsia    Essential hypertension    Gastroparesis    GERD (gastroesophageal reflux disease)    Insomnia    Sleep disorder    Social anxiety disorder    Spastic colon    Suicidal ideation    Depression, unspecified depression type    Encounter for pharmacogenetic testing       [place] Provider: Alexandra (not PCP).    ALLERGIES: Ginger and Citalopram     MEDICAL REVIEW OF SYSTEMS                                                                  none in addition to that documented above    CURRENT MEDS       Current Outpatient Medications   Medication Sig Dispense Refill    amitriptyline (ELAVIL) 50 MG tablet Take 1 tablet (50 mg) by mouth at bedtime. 30 tablet 3    cloNIDine (CATAPRES) 0.2 MG tablet Take 1 tablet (0.2 mg) by mouth at bedtime. 30 tablet 3    eszopiclone (LUNESTA) 3 MG tablet Take 1 tablet (3 mg) by mouth as needed for sleep. 30 tablet 3    gabapentin (NEURONTIN) 100 MG capsule Take 1 capsule (100 mg) by mouth 3 times daily. 90 " capsule 3    methylphenidate (METADATE CD) 10 MG CR capsule Take 1 capsule (10 mg) by mouth every morning. Take together with 20 mg capsules for a total dose of 50 mg. 30 capsule 0    methylphenidate (METADATE CD) 20 MG CR capsule Take 40 mg by mouth.      methylphenidate (METADATE CD) 20 MG CR capsule Take 2 capsules (40 mg) by mouth every morning. Take together with 10 mg capsule for a total dose of 50 mg. 60 capsule 0    ramelteon (ROZEREM) 8 MG tablet Take 1 tablet (8 mg) by mouth at bedtime. Do not take with a high fat meal. 30 tablet 3    Artificial Saliva (BIOTENE DRY MOUTH) LOZG Take 1 lozenge by mouth 3 times daily as needed (For medication induced dry mouth.). 81 lozenge 3    docusate sodium (DSS) 250 MG capsule Take 250 mg by mouth daily as needed      ondansetron (ZOFRAN ODT) 4 MG ODT tab Take 4 mg by mouth every 6 hours as needed for nausea      simethicone (MYLICON) 125 MG chewable tablet Take 125 mg by mouth 4 times daily as needed for intestinal gas          Psychotropic Medication Trials      Medication Max Dose (mg) Dates / Duration Helpful? DC Reason / Adverse Effects?   Zoloft (sertraline) Unknown Unknown No None   Paxil (paroxetine) Unknown Unknown No Cause SI at the age of 14   Celexa (citalopram) Unknown Unknown No Full body rash/hives in patient's 20s    Lexapro (escitalopram) Unknown Unknown No None   Wellbutrin  (bupropion) 100mg IR Unknown Yes None   Remeron (mirtazapine) Unknown Unknown Yes Weight gain of 30 pounds in one month   Viibryd (vilazodone) Unknown Unknown Yes Reported long withdrawal    Elavil (amitriptyline) 25-50mg Unknown Yes Somewhat helpful at 25mg; described a strange sensation of mental fog/slowing, but not sedation at 50mg   Abilify (aripriprazole) Unknown Unknown No Reports sedation     Neurontin (gabapentin) 300mg Unknown Unsure Unsure   Inderal (propranolol) 10mg Unknown Yes None   Ambien (zolpidem) 10mg Unknown Yes None   Melatonin Unknown Unknown No None   Xanax  (alprazolam) Unknown Unknown Yes None   Vistaril / Atarax (hydroxyzine) 50mg Unknown No Used for sleep, cause some drowsiness, but not enough for sleep onset       VITALS                                                                                              There were no vitals taken for this visit.   MENTAL STATUS EXAM                                                             Alertness: alert   Appearance: adequately groomed  Behavior/Demeanor: cooperative, with good  eye contact   Speech: regular rate and rhythm  Language: intact  Psychomotor: normal or unremarkable  Mood: description consistent with euthymia  Affect: full range; congruent to: mood- yes, content- yes  Thought Process/Associations: unremarkable  Thought Content:  Reports none;  Denies suicidal & violent ideation and delusions  Perception:  Reports none;  Denies hallucinations  Insight: adequate  Judgment: appropriate  Cognition: does  appear grossly intact; formal cognitive testing was not done  oriented: time, person, and place  attention span: intact  concentration: intact  recent memory: intact  remote memory: intact  fund of knowledge: appropriate  Gait and Station: N/A ([facility])    LABS and DATA         7/30/2024     9:50 AM 11/14/2024     7:36 AM 2/18/2025    10:01 AM   PHQ   PHQ-9 Total Score 4 4  6    Q9: Thoughts of better off dead/self-harm past 2 weeks Not at all Not at all Not at all       Patient-reported       PSYCHOTROPIC DRUG INTERACTIONS   Drug-Drug: cloNIDine and amitriptylineThe antihypertensive effects of Clonidine may be decreased by Tricyclic Antidepressants. Tricyclic Antidepressants may worsen rebound reactions from abrupt Clonidine withdrawal.    MANAGEMENT:  routine monitoring    RISK STATEMENT for SAFETY   Viktoria did not appear to be an imminent safety risk to self or others.    TREATMENT RISK STATEMENT:  The risks, benefits, alternatives and potential adverse effects have been discussed and are understood by the  pt. The pt understands the risks of using street drugs or alcohol. There are no medical contraindications, the pt agrees to treatment with the ability to do so. The pt knows to call the clinic for any problems or to access emergency care if needed.  Medical and substance use concerns are documented above.  Psychotropic drug interaction check was done, including changes made today.    PROVIDER:  BENITO Cho CNP       MEDICAL DECISION MAKING        (Daniel .PSYCHBILLMDM)     Level of Medical Decision Making:   - At least 1 chronic problem that is not stable  - Engaged in prescription drug management during visit (discussed any medication benefits, side effects, alternatives, etc.)      The longitudinal plan of care for the diagnosis(es)/condition(s) as documented were addressed during this visit. Due to the added complexity in care, I will continue to support Viktoria in the subsequent management and with ongoing continuity of care.

## 2025-02-18 NOTE — PATIENT INSTRUCTIONS
PLAN                                                                                                       1) Meds    CONTINUE Amitriptyline 50 mg daily at bedtime for depression    CONTINUE  Clonidine to 0.2 mg at bedtime -Please send recent BP via [employer].    CONTINUE Eszopiclone (Lunesta)  3 mg  CONTINUE Ramelteon (Rozerem) 8 mg     CONTINUE Gabapentin 100 mg three times daily as needed for anxiety and insomnia     INCREASE Metadate CD to 50 mg daily in the morning    CONTINUE Biotene Lozenges for Metadate associated dry mouth as needed three times daily     2) Other: None today.    3) RTC: 3 months     4) Crisis numbers in AVS.    **For crisis resources, please see the information at the end of this document**     Patient Education      Thank you for coming to the [facility] [place] [facility].     Lab Testing:  If you had lab testing today and your results are reassuring or normal they will be mailed to you or sent through [employer] within 7 days. If the lab tests need quick action we will call you with the results. The phone number we will call with results is # [phone]. If this is not the best number please call our clinic and change the number.     Medication Refills:  If you need any refills please call your pharmacy and they will contact us. Our fax number for refills is [phone].   Three business days of notice are needed for general medication refill requests.   Five business days of notice are needed for controlled substance refill requests.   If you need to change to a different pharmacy, please contact the new pharmacy directly. The new pharmacy will help you get your medications transferred.     Contact Us:  Please call [phone] during business hours (8-5:00 M-F).   If you have medication related questions after clinic hours, or on the weekend, please call [phone].     Financial Assistance [phone]   Medical Records [phone]       MENTAL  HEALTH CRISIS RESOURCES:  For a emergency help, please call 911 or go to the nearest Emergency Department.     Emergency Walk-In Options:   EmPATH Unit @ [facility] ([place]): [phone] - Specialized mental health emergency area designed to be calming   Health [facility] ([place]): [phone]  [facility] ([place]): [phone]  [facility]): [phone]    [facility] Crisis Information:   [place]: [phone]  [place]: [phone]  [place] ([place]) - Adult: [phone]     Child: [phone]  [place] - Adult: [phone]     Child: [phone]  [place]: [phone]  List of all [facility] resources:   https://[place].[facility]/dhs/people-we-serve/adults/health-care/mental-health/resources/crisis-contacts.jsp    National Crisis Information:   Crisis Text Line: Text  [place]  to 013627  Suicide & Crisis Lifeline: 988  National Suicide Prevention Lifeline: 2-143-241-TALK (1-319.741.9998)       For online chat options, visit https://suicidepreventionlifeline.org/chat/  Poison Control Center: 4-472-665-6232  Trans Lifeline: 1-987.248.9327 - Hotline for transgender people of all ages  The [employer] Project: 8-960-220-3099 - Hotline for LGBT youth     For Non-Emergency Support:   Fast Tracker: Mental Health & Substance Use Disorder Resources -   https://www.[employer].org/

## 2025-02-18 NOTE — NURSING NOTE
Current patient location: [address] [facility] [address]    Is the patient currently in the state of [place]? YES    Visit mode: VIDEO    If the visit is dropped, the patient can be reconnected by:VIDEO VISIT: Text to cell phone:   Telephone Information:   Mobile [phone]       Will anyone else be joining the visit? NO  (If patient encounters technical issues they should call [phone])    Are changes needed to the allergy or medication list? No    Are refills needed on medications prescribed by this physician?requesting refill on   methylphenidate (METADATE CD) 20 MG CR capsule     Rooming Documentation:  Questionnaire(s) completed    Reason for visit: RECHECK    Arcelia MICHELLEF

## 2025-02-24 ENCOUNTER — MYC MEDICAL ADVICE (OUTPATIENT)
Dept: PSYCHIATRY | Facility: CLINIC | Age: 40
End: 2025-02-24
Payer: COMMERCIAL

## 2025-02-24 ENCOUNTER — TELEPHONE (OUTPATIENT)
Dept: PSYCHIATRY | Facility: CLINIC | Age: 40
End: 2025-02-24
Payer: COMMERCIAL

## 2025-02-24 ENCOUNTER — TRANSCRIBE ORDERS (OUTPATIENT)
Dept: OTHER | Age: 40
End: 2025-02-24

## 2025-02-24 DIAGNOSIS — R10.2 PELVIC PAIN IN FEMALE: Primary | ICD-10-CM

## 2025-02-24 NOTE — LETTER
2/24/2025       RE: Viktoria Guillen  [address]  [facility] [address]     TO: [employer]     Viktoria Guillen is a patient under my care at [facility]. I am writing to request that you reconsider coverage for RAMELTEON 8 MG, which was denied on 2/27/25.    She is being treated for the diagnoses of  Major Depressive Disorder, Social Anxiety Diosrder, ADD, and Insomnia. Viktoria has been stable on a combination of prescribed medications, including Ramelteon 8 MG for several years now. 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 Patient had previously trialed zolpidem, melatonin, alprazolam, and hydroxyzine for sleep, all of which were ineffective or caused side effects.     Please consider approving coverage for Ramelteon for this patient. Abruptly stopping this medication will likely cause decompensation of insomnia and mental health symptoms, possibly requiring hospitalization. If denied, we formally request a Peer to Peer insurance review.     Please contact my office if you require further information, [phone]. Thank you for your prompt attention in this matter.    Sincerely,    Marixa Kay, SIXTO, APRN, PMHNP-BC   [facility]

## 2025-02-24 NOTE — TELEPHONE ENCOUNTER
Prior Authorization [employer] Medication Request    Medication/Dose: ramelteon (ROZEREM) 8 MG tablet  Diagnosis and ICD code (if different than what is on RX):  Insomnia due to other mental disorder [F51.05, F99]   New/renewal/insurance change PA/secondary ins. PA: NEW  Previously Tried and Failed:     Medication Max Dose (mg) Dates / Duration Helpful? DC Reason / Adverse Effects?   Zoloft (sertraline) Unknown Unknown No None   Paxil (paroxetine) Unknown Unknown No Cause SI at the age of 14   Celexa (citalopram) Unknown Unknown No Full body rash/hives in patient's 20s    Lexapro (escitalopram) Unknown Unknown No None   Wellbutrin  (bupropion) 100mg IR Unknown Yes None   Remeron (mirtazapine) Unknown Unknown Yes Weight gain of 30 pounds in one month   Viibryd (vilazodone) Unknown Unknown Yes Reported long withdrawal    Elavil (amitriptyline) 25-50mg Unknown Yes Somewhat helpful at 25mg; described a strange sensation of mental fog/slowing, but not sedation at 50mg   Abilify (aripriprazole) Unknown Unknown No Reports sedation     Neurontin (gabapentin) 300mg Unknown Unsure Unsure   Inderal (propranolol) 10mg Unknown Yes None   Ambien (zolpidem) 10mg Unknown Yes None   Melatonin Unknown Unknown No None   Xanax (alprazolam) Unknown Unknown Yes None   Vistaril / Atarax (hydroxyzine) 50mg Unknown No Used for sleep, cause some drowsiness, but not enough for sleep onset     Rationale:  Patient has a diagnosis of Insomnia and was prescribed Ramelteon. This has been beneficial to the patient with their insomnia. We ask that this be approved to prevent patient from missing any needed doses.     Insurance   Primary: [facility]   Insurance ID:  [id number]     Secondary (if applicable):None  Insurance ID:  None    Pharmacy Information (if different than what is on RX)  [id number] [address] AT [facility]   Phone:  [phone]   Fax:[phone]     [facility]  Information  Preferred routing pool for dept communication: Psychiatry Nurse- [facility]

## 2025-02-26 NOTE — TELEPHONE ENCOUNTER
Marixa Kay, BENITO CNP  You1 hour ago (2:12 PM)       Could you send a response:    There may be risks involved, such as the risk of sedation, including slowed breathing. It would likely be ok to trial a small dose for short term use. You might need to plan to stay home and not work or drive while you learn how you respond to the combination of medications. Pay attention to how you feel, ask your partner to also be aware of the risks, seek urgent or emergency care if you are concerned about over sedation or decreased respiratory rate.     Patient updated via [employer].

## 2025-02-26 NOTE — TELEPHONE ENCOUNTER
PA Initiation    Medication: RAMELTEON 8 MG PO TABS  Insurance Company: [employer] Non-Specialty PA's - Phone [phone] Fax [phone]  Pharmacy Filling the Rx: [id number] [place], [place] - [address] AT [facility]  Filling Pharmacy Phone: [phone]  Filling Pharmacy Fax: [phone]  Start Date: 2/26/2025

## 2025-02-26 NOTE — TELEPHONE ENCOUNTER
PRIOR AUTHORIZATION DENIED    Medication: RAMELTEON 8 MG PO TABS    Insurance Company: [employer] Non-Specialty PA's - Phone [phone] Fax [phone]    Denial Date: 2/26/2025    Denial Reason(s): Patient is using a stimulant currently.           Appeal Information:

## 2025-02-27 NOTE — TELEPHONE ENCOUNTER
Marixa Kay APRN CNP [place], Stephanie A, RN  Perfect! Thank you so much!          Previous Messages       ----- Message -----  From: Sarah Harper RN  Sent: 2/27/2025   2:24 PM CST  To: BENITO Cho CNP; *    See letter - let me know if you want anything added or changed!

## 2025-02-27 NOTE — TELEPHONE ENCOUNTER
Medication Appeal Initiation    Medication: RAMELTEON 8 MG PO TABS  Appeal Start Date:  2/27/2025  Insurance Company: [employer] Non-Specialty PA's - Phone [phone] Fax [phone]   Insurance Phone: [phone]   Insurance Fax: [phone]   Comments:

## 2025-03-03 NOTE — TELEPHONE ENCOUNTER
Writer spoke with [facility] and confirmed that prescription processed successfully.  Attempted to call patient, no answer. Left VM update that medication was approved and to contact clinic if she has any questions.

## 2025-03-03 NOTE — TELEPHONE ENCOUNTER
Writer spoke with [facility] Pharmacist and confirmed that PA appeal was approved. They are still getting a denial from pharmacy. Plan for writer to reach out to pharmacy later today.

## 2025-03-03 NOTE — TELEPHONE ENCOUNTER
5 pages of forms from [employer] completed and electronically signed by Marixa OLIVER.   Completed forms faxed to Appeals department at [employer]; faxed to both numbers listed on forms, [phone] and [phone].   Copy of forms kept in triage until scanning confirmed.

## 2025-03-03 NOTE — TELEPHONE ENCOUNTER
MEDICATION APPEAL APPROVED    Medication: RAMELTEON 8 MG PO TABS  Authorization Effective Date: 2/13/2025  Authorization Expiration Date: 2/28/2026  Approved Dose/Quantity:   Reference #:     Appeal Insurance Company: [employer] Non-Specialty PA's - Phone [phone] Fax [phone]   Expected CoPay: $       CoPay Card Available:    Financial Assistance Needed:   Filling Pharmacy: [facility] #[id number] [facility] [address] AT [facility] OF [facility]  Patient Notified: **Instructed pharmacy to notify patient when script is ready to /ship.**  Comments:

## 2025-03-17 ENCOUNTER — MYC REFILL (OUTPATIENT)
Dept: PSYCHIATRY | Facility: CLINIC | Age: 40
End: 2025-03-17
Payer: COMMERCIAL

## 2025-03-17 DIAGNOSIS — F90.0 ATTENTION DEFICIT HYPERACTIVITY DISORDER (ADHD), PREDOMINANTLY INATTENTIVE TYPE: ICD-10-CM

## 2025-03-17 RX ORDER — METHYLPHENIDATE HYDROCHLORIDE 10 MG/1
10 CAPSULE, EXTENDED RELEASE ORAL EVERY MORNING
Qty: 30 CAPSULE | Refills: 0 | Status: SHIPPED | OUTPATIENT
Start: 2025-03-20

## 2025-03-17 NOTE — TELEPHONE ENCOUNTER
Last seen: 2/18/25  RTC: 3 months  Cancel: none  No-show: none  Next appt: 5/8/25      Medication requested:   Pending Prescriptions:                       Disp   Refills    methylphenidate (METADATE CD) 10 MG CR ca*30 cap*0            Sig: Take 1 capsule (10 mg) by mouth every morning.           Take together with 20 mg capsules for a total           dose of 50 mg.        Last refill per       From chart note:   INCREASE Metadate CD to 50 mg daily in the morning       Medication unable to be refilled by RN due to criteria not met as indicated.                 []Eligibility - not seen in the last year              []Supervision - no future appointment              []Compliance - no shows, cancellations or lapse in therapy              []Verification - order discrepancy              [x]Controlled medication              []Medication not included in policy              []90-day supply request              []Other:      
Verbal - The patient responds to verbal stimuli by opening their eyes when someone speaks to them. The patient is not fully oriented to time, place, or person.

## 2025-03-25 ENCOUNTER — TELEPHONE (OUTPATIENT)
Dept: PSYCHIATRY | Facility: CLINIC | Age: 40
End: 2025-03-25
Payer: COMMERCIAL

## 2025-03-25 NOTE — TELEPHONE ENCOUNTER
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 PA: NEW    Previously Tried and Failed:    Medication Max Dose (mg) Dates / Duration Helpful? DC Reason / Adverse Effects?   Zoloft (sertraline) Unknown Unknown No None   Paxil (paroxetine) Unknown Unknown No Cause SI at the age of 14   Celexa (citalopram) Unknown Unknown No Full body rash/hives in patient's 20s    Lexapro (escitalopram) Unknown Unknown No None   Wellbutrin  (bupropion) 100mg IR Unknown Yes None   Remeron (mirtazapine) Unknown Unknown Yes Weight gain of 30 pounds in one month   Viibryd (vilazodone) Unknown Unknown Yes Reported long withdrawal    Elavil (amitriptyline) 25-50mg Unknown Yes Somewhat helpful at 25mg; described a strange sensation of mental fog/slowing, but not sedation at 50mg   Abilify (aripriprazole) Unknown Unknown No Reports sedation     Neurontin (gabapentin) 300mg Unknown Unsure Unsure   Inderal (propranolol) 10mg Unknown Yes None   Ambien (zolpidem) 10mg Unknown Yes None   Melatonin Unknown Unknown No None   Xanax (alprazolam) Unknown Unknown Yes None   Vistaril / Atarax (hydroxyzine) 50mg Unknown No Used for sleep, cause some drowsiness, but not enough for sleep onset     Rationale:  Patient has been on this medication since November of 2023. Patient has found this beneficial in helping them with their sleep. We ask that this be approved to prevent patient from missing any doses.     Insurance   Primary: [facility]   Insurance ID:  [id number]     Secondary (if applicable):None  Insurance ID:  None    Pharmacy Information (if different than what is on RX)  Name:  [facility] #[id number] [facility] [address] AT [facility] OF [facility]   Phone:  [phone]   Fax:[phone]     [facility]  Information  Preferred routing pool for dept communication: Psychiatry Nurse-[facility]

## 2025-03-25 NOTE — LETTER
3/25/2025       RE: Viktoria Guillen  [address]  [place] [address]     TO: [employer]        Viktoria Guillen is a patient under my care at [facility]. I am writing to request that you reconsider coverage for ESZOPICLONE 3 MG, which was denied on 3/27/25.     She is being treated for the diagnoses of  Major Depressive Disorder, Social Anxiety Diosrder, ADD, and Insomnia. Viktoria has been stable on a combination of prescribed medications, including Eszopiclone 3 MG for several years now. Eszopiclone has been prescribed to Viktoria since October 2023 for the diagnosis of Insomnia due to other mental disorder, F51.05, F99. Patient had previously trialed zolpidem, melatonin, alprazolam, and hydroxyzine for sleep, all of which were ineffective or caused side effects.      Please consider approving coverage for Eszopiclone for this patient. Abruptly stopping this medication will likely cause decompensation of insomnia and mental health symptoms, possibly requiring hospitalization. If denied, we formally request a Peer to Peer insurance review.      Please contact my office if you require further information, [phone]. Thank you for your prompt attention in this matter.     Sincerely,    Marixa Kay, SIXTO, APRN, PMHNP-BC   [facility]

## 2025-03-26 NOTE — TELEPHONE ENCOUNTER
PRIOR AUTHORIZATION DENIED    Medication: ESZOPICLONE 3 MG PO TABS  Insurance Company: [employer] Non-Specialty PA's - Phone [phone] Fax [phone]  Denial Date: 3/26/2025  Denial Reason(s):     Appeal Information:     Patient Notified: No, care team must notify

## 2025-03-26 NOTE — TELEPHONE ENCOUNTER
PA Initiation    Medication: ESZOPICLONE 3 MG PO TABS  Insurance Company: [employer] Non-Specialty PA's - Phone [phone] Fax [phone]  Pharmacy Filling the Rx: [facility] #[id number] [place], [place] - [address] AT [facility] OF [facility]  Filling Pharmacy Phone: [phone]  Filling Pharmacy Fax: [phone]  Start Date: 3/26/2025

## 2025-03-27 NOTE — TELEPHONE ENCOUNTER
Medication Appeal Initiation    Medication: ESZOPICLONE 3 MG PO TABS  Appeal Start Date:  3/27/2025  Insurance Company: [employer] Non-Specialty PA's - Phone [phone] Fax [phone]   Insurance Phone: [employer]-Specialty PA's - Phone [phone] Fax [phone]   Insurance Fax: [employer] Non-Specialty PA's - Phone [phone] Fax [phone]   Comments:       Initiated appeal via eAppeal on CMM. Requested expedited review. If plan determines case does not meet criteria for expedited review, plan has 30 days to make determination.

## 2025-03-31 NOTE — TELEPHONE ENCOUNTER
Per Cover My Meds, Appeal has been approved.        Called [facility] pharmacy and confirmed the medication can now be processed.    Ricarda Lopez RN on 3/31/2025 at 9:54 AM

## 2025-04-01 NOTE — TELEPHONE ENCOUNTER
MEDICATION APPEAL APPROVED    Medication: ESZOPICLONE 3 MG PO TABS  Authorization Effective Date: 3/13/2025  Authorization Expiration Date: 3/28/2026  Approved Dose/Quantity: as written  Reference #: [id number]   [facility] Insurance Company:  [employer] Non-Specialty PA's - Phone [phone] Fax [phone]   Filling Pharmacy: [facility] #[id number] [facility] [address] AT [facility] OF [facility]  Patient Notified: n/a - clinic took care of  Comments:

## 2025-05-08 ENCOUNTER — VIRTUAL VISIT (OUTPATIENT)
Dept: PSYCHIATRY | Facility: CLINIC | Age: 40
End: 2025-05-08
Attending: PSYCHIATRY & NEUROLOGY
Payer: COMMERCIAL

## 2025-05-08 DIAGNOSIS — F33.9 MAJOR DEPRESSIVE DISORDER, RECURRENT EPISODE WITH ANXIOUS DISTRESS: ICD-10-CM

## 2025-05-08 DIAGNOSIS — F99 INSOMNIA DUE TO OTHER MENTAL DISORDER: ICD-10-CM

## 2025-05-08 DIAGNOSIS — F90.0 ATTENTION DEFICIT HYPERACTIVITY DISORDER (ADHD), PREDOMINANTLY INATTENTIVE TYPE: Primary | ICD-10-CM

## 2025-05-08 DIAGNOSIS — F51.05 INSOMNIA DUE TO OTHER MENTAL DISORDER: ICD-10-CM

## 2025-05-08 RX ORDER — METHYLPHENIDATE HYDROCHLORIDE 20 MG/1
40 CAPSULE, EXTENDED RELEASE ORAL DAILY
Qty: 60 CAPSULE | Refills: 0 | Status: SHIPPED | OUTPATIENT
Start: 2025-05-19 | End: 2025-06-18

## 2025-05-08 RX ORDER — AMITRIPTYLINE HYDROCHLORIDE 50 MG/1
50 TABLET ORAL AT BEDTIME
Qty: 30 TABLET | Refills: 3 | Status: SHIPPED | OUTPATIENT
Start: 2025-05-08

## 2025-05-08 RX ORDER — ESZOPICLONE 3 MG/1
3 TABLET, FILM COATED ORAL PRN
Qty: 30 TABLET | Refills: 3 | Status: SHIPPED | OUTPATIENT
Start: 2025-05-08

## 2025-05-08 RX ORDER — METHYLPHENIDATE HYDROCHLORIDE 10 MG/1
10 CAPSULE, EXTENDED RELEASE ORAL DAILY
Qty: 30 CAPSULE | Refills: 0 | Status: SHIPPED | OUTPATIENT
Start: 2025-07-14 | End: 2025-08-13

## 2025-05-08 RX ORDER — OMEPRAZOLE 20 MG/1
20 TABLET, DELAYED RELEASE ORAL DAILY
COMMUNITY

## 2025-05-08 RX ORDER — RAMELTEON 8 MG/1
8 TABLET ORAL AT BEDTIME
Qty: 30 TABLET | Refills: 3 | Status: SHIPPED | OUTPATIENT
Start: 2025-05-08

## 2025-05-08 RX ORDER — METHYLPHENIDATE HYDROCHLORIDE 10 MG/1
10 CAPSULE, EXTENDED RELEASE ORAL DAILY
Qty: 30 CAPSULE | Refills: 0 | Status: SHIPPED | OUTPATIENT
Start: 2025-05-19 | End: 2025-06-18

## 2025-05-08 RX ORDER — METHYLPHENIDATE HYDROCHLORIDE 20 MG/1
40 CAPSULE, EXTENDED RELEASE ORAL DAILY
Qty: 60 CAPSULE | Refills: 0 | Status: SHIPPED | OUTPATIENT
Start: 2025-06-16 | End: 2025-07-16

## 2025-05-08 RX ORDER — METHYLPHENIDATE HYDROCHLORIDE EXTENDED RELEASE 20 MG/1
20 TABLET ORAL EVERY MORNING
Qty: 60 TABLET | Refills: 0 | Status: CANCELLED | OUTPATIENT
Start: 2025-05-08

## 2025-05-08 RX ORDER — CLONIDINE HYDROCHLORIDE 0.2 MG/1
0.2 TABLET ORAL AT BEDTIME
Qty: 30 TABLET | Refills: 3 | Status: SHIPPED | OUTPATIENT
Start: 2025-05-08

## 2025-05-08 RX ORDER — GABAPENTIN 100 MG/1
100 CAPSULE ORAL 3 TIMES DAILY
Qty: 90 CAPSULE | Refills: 3 | Status: SHIPPED | OUTPATIENT
Start: 2025-05-08

## 2025-05-08 RX ORDER — METHYLPHENIDATE HYDROCHLORIDE 10 MG/1
10 CAPSULE, EXTENDED RELEASE ORAL DAILY
Qty: 30 CAPSULE | Refills: 0 | Status: SHIPPED | OUTPATIENT
Start: 2025-06-16 | End: 2025-07-16

## 2025-05-08 RX ORDER — METHYLPHENIDATE HYDROCHLORIDE 20 MG/1
40 CAPSULE, EXTENDED RELEASE ORAL DAILY
Qty: 60 CAPSULE | Refills: 0 | Status: SHIPPED | OUTPATIENT
Start: 2025-07-14 | End: 2025-08-13

## 2025-05-08 ASSESSMENT — PATIENT HEALTH QUESTIONNAIRE - PHQ9
SUM OF ALL RESPONSES TO PHQ QUESTIONS 1-9: 3
10. IF YOU CHECKED OFF ANY PROBLEMS, HOW DIFFICULT HAVE THESE PROBLEMS MADE IT FOR YOU TO DO YOUR WORK, TAKE CARE OF THINGS AT HOME, OR GET ALONG WITH OTHER PEOPLE: NOT DIFFICULT AT ALL
SUM OF ALL RESPONSES TO PHQ QUESTIONS 1-9: 3

## 2025-05-08 ASSESSMENT — PAIN SCALES - GENERAL: PAINLEVEL_OUTOF10: NO PAIN (0)

## 2025-05-08 NOTE — PROGRESS NOTES
[facility]  Psychiatry Clinic  PSYCHIATRY PROGRESS NOTE     CARE TEAM:  PCP- No Ref-Primary, Physician   GI- at [facility].  Viktoria is a 39 year old who prefers the name Viktoria and uses pronouns she, her.     DIAGNOSES                                                                                        Major Depressive Disorder with Anxious Distress  Insomnia  ADHD  ASD    ASSESSMENT                                                                                          Viktoria Guillen presented at intake with symptoms supporting a diagnosis of MAJOR DEPRESSIVE DISORDER with anxious distress. While she initially felt some improvement on Wellbutrin  mg, she subsequently experienced significant increase in depressed mood starting in June 2023. Viktoria pursued a referral to [facility] for ketamine treatment for depression. She reported a significant response in terms of mood and physical GI distress symptoms. Anxiety and insomnia continued to be an issue and possibly worsened after starting ketamine, but improved with the addition of ramelteon and eszopiclone. Tapered off Wellbutrin, which initially improved anxiety and insomnia. Recently completed [employer] testing.    Spring 2024: completed psychological testing with Larry Castillo which confirmed diagnoses of ASD, ADHD.     February 2024: started stimulant treatment for ADHD. Viktoria meets six inattentive criteria for diagnosis. Clear evidence symptoms started around age 10. ADHD symptoms are impacting her professionally (to the degree that she is constantly fearful she will be fired/ boss has expressed this), also negatively impact her home life, relationship, and ability to manage responsibilities. Viktoria was on stimulant medication for fatigue during college, and felt this was the time in her life when her mental health was most stable. Also reports excelling in college while on stimulant medications.     [employer] test results predict no  gene-drug interaction for dexmethylphenidate (Focalin) or methylphenidate (Ritalin, Concerta). Moderate gene-drug interaction is predicted for atomoxetine (Strattera).    Viktoria felt propranolol was no longer as helpful as it used to be. Discontinued and switch to clonidine 0.1 mg at bedtime. In June 2024, increased to 0.2 mg, which was helpful. Continue amitriptyline, eszopiclone, and ramelteon. Goal of eventually discontinuing eszopiclone.   Started Metadate CD 10 mg at the end of February 2024. Viktoria reports this has been very helpful for managing ADHD symptoms. Anxiety initially worse, but then leveled out. Sleep and mood have improved since starting Metadate.   In March trial of 20 mg dose. Also started Biotene Lozenges for dry mouth associated with Metadate. Considered trialing a new stimulant, but Viktoria preferred to stay with Metadate for now.   End of May 2024, increased Metadate to 30 mg. November 2024 increased Metadate to 40 mg after starting new job and struggling more with ADHD symptoms.   2/18/25: increased Metadate CD to 50 mg.    Today, mood, anxiety, insomnia well managed. ADHD improved with recent changes in work schedule and getting more help at work. Discussed additional non-pharmacological strategies for managing remaining ADHD symptoms. Collaboratively agreed to no medication changes today; continue Metadate CD 50 mg (rx for two 20 mg capsules + one 10 mg capsule due to shortage of 50 mg capsules), amitriptyline 50 mg, clonidine 0.2 mg, eszopiclone 3 mg, ramelteon 8 mg, and as needed gabapentin 100 mg three times daily.   Viktoria denied signs or symptoms of hypotension.     Follow-up in 3 months.     [facility] was checked today:  Indicates taking controlled medication as prescribed.    PLAN                                                                                                       1) Meds    CONTINUE Amitriptyline 50 mg daily at bedtime for depression  CONTINUE  Clonidine to 0.2 mg at bedtime  "  CONTINUE Eszopiclone (Lunesta)  3 mg  CONTINUE Ramelteon (Rozerem) 8 mg   CONTINUE Gabapentin 100 mg three times daily as needed for anxiety and insomnia     CONTINUE Metadate CD to 50 mg daily in the morning    CONTINUE Biotene Lozenges for Metadate associated dry mouth as needed three times daily     2) Other: None today.    3) RTC: 3 months     4) Crisis numbers in AVS.     CHIEF COMPLAINT                                      \" Following up \"     PERTINENT BACKGROUND                                         [initial eval 02/16/23]     History of depressive episodes starting at age 14. She reports many past medication trials, as well as a long history of working with a psychotherapist. She has had several episodes of suicidal ideation over the years, with the most significant occurring in 2015. At that time, she had no attempts or concrete plan, but had daily intrusive SI thoughts. In 2013 she had one episode of self-injury that involved cutting her wrist with a steak knife. She reports many episodes of depression over the past 23 years. She also reports a history of anxiety.     Psych pertinent item history includes SIB     HISTORY OF PRESENT ILLNESS                                                      Since the last visit, Viktoria reports things are going well. She's working part time.   Has an upcoming hysterectomy (ovarian sparing) for endometriosis.     Mood = improved with returning to part time work, denies depression  Anxiety = endorses some social anxiety, manageable, may be improving   Sleep = variable; insomnia does not feel tied to mood or anxiety    Appetite = low until methylphenidate wears off, increases around 3 pm; incorporating nutrient dense shakes at breakfast and lunch, snacks on trail mix; struggling with constipation  Energy = \"really good\"    ADHD = feels well managed since stress improved; managing with school work, some procrastination, struggles with task switching    Has not done any " "ketamine treatments since last appointment.   Taking gabapentin 200 mg at bedtime; occasionally takes 100 mg prior to anxiety-inducing social situations.     Denies SI.     Adverse Med Effects:  See table below  Pertinent Negatives:  No   Recent Substance Use:    None reported    SOCIAL and FAMILY HISTORY                                                 per pt report         Family Hx:  Not obtained at this visit.     Social Hx:  Social Support - good social support from girlfriend \"great relationship\"    PAST PSYCH and SUBSTANCE USE HISTORY                      Psych:  Suicidal ideation - Past, none currently   SIB - One prior episode 2013, none currently      Substance Use:  No additional substance use history.    MEDICAL HISTORY     Patient Active Problem List   Diagnosis    Depression with anxiety    Chronic idiopathic constipation    ADD (attention deficit disorder)    Dyspepsia    Essential hypertension    Gastroparesis    GERD (gastroesophageal reflux disease)    Insomnia    Sleep disorder    Social anxiety disorder    Spastic colon    Suicidal ideation    Depression, unspecified depression type    Encounter for pharmacogenetic testing       [place] Provider: Alexandra (not PCP).    ALLERGIES: Ginger and Citalopram     MEDICAL REVIEW OF SYSTEMS                                                                  none in addition to that documented above    CURRENT MEDS       Current Outpatient Medications   Medication Sig Dispense Refill    amitriptyline (ELAVIL) 50 MG tablet Take 1 tablet (50 mg) by mouth at bedtime. 30 tablet 3    Artificial Saliva (BIOTENE DRY MOUTH) LOZG Take 1 lozenge by mouth 3 times daily as needed (For medication induced dry mouth.). 81 lozenge 3    cloNIDine (CATAPRES) 0.2 MG tablet Take 1 tablet (0.2 mg) by mouth at bedtime. 30 tablet 3    docusate sodium (DSS) 250 MG capsule Take 250 mg by mouth daily as needed      eszopiclone (LUNESTA) 3 MG tablet Take 1 tablet (3 mg) by mouth as needed " for sleep. 30 tablet 3    gabapentin (NEURONTIN) 100 MG capsule Take 1 capsule (100 mg) by mouth 3 times daily. 90 capsule 3    [START ON 5/19/2025] methylphenidate (METADATE CD) 10 MG CR capsule Take 1 capsule (10 mg) by mouth daily. Take together with (2) 20 mg capsules for a total daily dose of 50 mg. 30 capsule 0    [START ON 6/16/2025] methylphenidate (METADATE CD) 10 MG CR capsule Take 1 capsule (10 mg) by mouth daily. Take together with (2) 20 mg capsules for a total daily dose of 50 mg. 30 capsule 0    [START ON 7/14/2025] methylphenidate (METADATE CD) 10 MG CR capsule Take 1 capsule (10 mg) by mouth daily. Take together with (2) 20 mg capsules for a total daily dose of 50 mg. 30 capsule 0    methylphenidate (METADATE CD) 10 MG CR capsule Take 1 capsule (10 mg) by mouth every morning. Take together with 20 mg capsules for a total dose of 50 mg. 30 capsule 0    [START ON 5/19/2025] methylphenidate (METADATE CD) 20 MG CR capsule Take 2 capsules (40 mg) by mouth daily. Take together with a 10 mg capsule for a total daily dose of 50 mg. 60 capsule 0    [START ON 6/16/2025] methylphenidate (METADATE CD) 20 MG CR capsule Take 2 capsules (40 mg) by mouth daily. Take together with a 10 mg capsule for a total daily dose of 50 mg. 60 capsule 0    [START ON 7/14/2025] methylphenidate (METADATE CD) 20 MG CR capsule Take 2 capsules (40 mg) by mouth daily. Take together with a 10 mg capsule for a total daily dose of 50 mg. 60 capsule 0    methylphenidate (METADATE CD) 20 MG CR capsule Take 40 mg by mouth.      methylphenidate (METADATE ER) 20 MG CR tablet Take 1 tablet (20 mg) by mouth every morning. 60 tablet 0    omeprazole (PRILOSEC OTC) 20 MG EC tablet Take 20 mg by mouth daily.      ondansetron (ZOFRAN ODT) 4 MG ODT tab Take 4 mg by mouth every 6 hours as needed for nausea      ramelteon (ROZEREM) 8 MG tablet Take 1 tablet (8 mg) by mouth at bedtime. Do not take with a high fat meal. 30 tablet 3    simethicone  (MYLICON) 125 MG chewable tablet Take 125 mg by mouth 4 times daily as needed for intestinal gas          Psychotropic Medication Trials      Medication Max Dose (mg) Dates / Duration Helpful? DC Reason / Adverse Effects?   Zoloft (sertraline) Unknown Unknown No None   Paxil (paroxetine) Unknown Unknown No Cause SI at the age of 14   Celexa (citalopram) Unknown Unknown No Full body rash/hives in patient's 20s    Lexapro (escitalopram) Unknown Unknown No None   Wellbutrin  (bupropion) 100mg IR Unknown Yes None   Remeron (mirtazapine) Unknown Unknown Yes Weight gain of 30 pounds in one month   Viibryd (vilazodone) Unknown Unknown Yes Reported long withdrawal    Elavil (amitriptyline) 25-50mg Unknown Yes Somewhat helpful at 25mg; described a strange sensation of mental fog/slowing, but not sedation at 50mg   Abilify (aripriprazole) Unknown Unknown No Reports sedation     Neurontin (gabapentin) 300mg Unknown Unsure Unsure   Inderal (propranolol) 10mg Unknown Yes None   Ambien (zolpidem) 10mg Unknown Yes None   Melatonin Unknown Unknown No None   Xanax (alprazolam) Unknown Unknown Yes None   Vistaril / Atarax (hydroxyzine) 50mg Unknown No Used for sleep, cause some drowsiness, but not enough for sleep onset       VITALS                                                                                              There were no vitals taken for this visit.   MENTAL STATUS EXAM                                                             Alertness: alert   Appearance: adequately groomed  Behavior/Demeanor: cooperative, with good  eye contact   Speech: regular rate and rhythm  Language: intact  Psychomotor: normal or unremarkable  Mood: description consistent with euthymia  Affect: full range; congruent to: mood- yes, content- yes  Thought Process/Associations: unremarkable  Thought Content:  Reports none;  Denies suicidal & violent ideation and delusions  Perception:  Reports none;  Denies hallucinations  Insight:  adequate  Judgment: appropriate  Cognition: does  appear grossly intact; formal cognitive testing was not done  oriented: time, person, and place  attention span: intact  concentration: intact  recent memory: intact  remote memory: intact  fund of knowledge: appropriate  Gait and Station: N/A (telehealth)    LABS and DATA         11/14/2024     7:36 AM 2/18/2025    10:01 AM 5/8/2025     7:42 AM   PHQ   PHQ-9 Total Score 4  6  3    Q9: Thoughts of better off dead/self-harm past 2 weeks Not at all Not at all Not at all       Patient-reported       PSYCHOTROPIC DRUG INTERACTIONS   Drug-Drug: cloNIDine and amitriptylineThe antihypertensive effects of Clonidine may be decreased by Tricyclic Antidepressants. Tricyclic Antidepressants may worsen rebound reactions from abrupt Clonidine withdrawal.    MANAGEMENT:  routine monitoring    RISK STATEMENT for SAFETY   Viktoria did not appear to be an imminent safety risk to self or others.    TREATMENT RISK STATEMENT:  The risks, benefits, alternatives and potential adverse effects have been discussed and are understood by the pt. The pt understands the risks of using street drugs or alcohol. There are no medical contraindications, the pt agrees to treatment with the ability to do so. The pt knows to call the clinic for any problems or to access emergency care if needed.  Medical and substance use concerns are documented above.  Psychotropic drug interaction check was done, including changes made today.    PROVIDER:  BENITO Cho CNP       MEDICAL DECISION MAKING        ([facility])     Level of Medical Decision Making:   - At least 1 chronic problem that is not stable  - Engaged in prescription drug management during visit (discussed any medication benefits, side effects, alternatives, etc.)      The longitudinal plan of care for the diagnosis(es)/condition(s) as documented were addressed during this visit. Due to the added complexity in care, I will continue to  kaiden Blum in the subsequent management and with ongoing continuity of care.

## 2025-05-08 NOTE — NURSING NOTE
Current patient location: [address] [facility] [address]    Is the patient currently in the state of [place]? YES    Visit mode: VIDEO    If the visit is dropped, the patient can be reconnected by:VIDEO VISIT: Text to cell phone:   Telephone Information:   Mobile [phone]       Will anyone else be joining the visit? NO  (If patient encounters technical issues they should call [phone]    Are changes needed to the allergy or medication list? No    Are refills needed on medications prescribed by this physician? Discuss with provider    Rooming Documentation:  Questionnaire(s) completed    Reason for visit: YONI MICHELLEF

## 2025-05-08 NOTE — PATIENT INSTRUCTIONS
PLAN                                                                                                       1) Meds    CONTINUE Amitriptyline 50 mg daily at bedtime for depression  CONTINUE  Clonidine to 0.2 mg at bedtime   CONTINUE Eszopiclone (Lunesta)  3 mg  CONTINUE Ramelteon (Rozerem) 8 mg   CONTINUE Gabapentin 100 mg three times daily as needed for anxiety and insomnia     CONTINUE Metadate CD to 50 mg daily in the morning    CONTINUE Biotene Lozenges for Metadate associated dry mouth as needed three times daily     2) Other: None today.    3) RTC: 3 months     4) Crisis numbers in AVS.    **For crisis resources, please see the information at the end of this document**     Patient Education      Thank you for coming to the [facility] [facility].     Lab Testing:  If you had lab testing today and your results are reassuring or normal they will be mailed to you or sent through [employer] within 7 days. If the lab tests need quick action we will call you with the results. The phone number we will call with results is # [phone]. If this is not the best number please call our clinic and change the number.     Medication Refills:  If you need any refills please call your pharmacy and they will contact us. Our fax number for refills is [phone].   Three business days of notice are needed for general medication refill requests.   Five business days of notice are needed for controlled substance refill requests.   If you need to change to a different pharmacy, please contact the new pharmacy directly. The new pharmacy will help you get your medications transferred.     Contact Us:  Please call [phone] during business hours (8-5:00 M-F).   If you have medication related questions after clinic hours, or on the weekend, please call [phone].     Financial Assistance [phone]   Medical Records [phone]       MENTAL HEALTH CRISIS RESOURCES:  For a emergency help,  please call 911 or go to the nearest Emergency Department.     Emergency Walk-In Options:   [facility] Unit @ [facility] ([place]): [phone] - Specialized mental health emergency area designed to be calming  [facility] ([place]): [phone]  [facility] ([place]): [phone]  [facility] ([place]): [phone]    [facility] Crisis Information:   [place]: [phone]  [place]: [phone]  [place] (COPE) - Adult: [phone]     Child: [phone]  [place] - Adult: [phone]     Child: [phone]  [place]: [phone]  List of all [facility] resources:   https://[email or web address]/dhs/people-we-serve/adults/health-care/mental-health/resources/crisis-contacts.jsp    National Crisis Information:   Crisis Text Line: Text  [place]  to 586882  Suicide & Crisis Lifeline: 988  National Suicide Prevention Lifeline: 4-832-911-TALK (1-213.148.7891)       For online chat options, visit https://suicidepreventionlifeline.org/chat/  Poison Control Center: 1-506.533.6717  Trans Lifeline: 1-215.857.5564 - Hotline for transgender people of all ages  [employer]: 1-783-375-1525 - Hotline for LGBT youth     For Non-Emergency Support:   Fast Tracker: Mental Health & Substance Use Disorder Resources -   https://www.[facility].lyn.org/

## 2025-07-19 ENCOUNTER — HEALTH MAINTENANCE LETTER (OUTPATIENT)
Age: 40
End: 2025-07-19

## 2025-08-12 ENCOUNTER — VIRTUAL VISIT (OUTPATIENT)
Dept: PSYCHIATRY | Facility: CLINIC | Age: 40
End: 2025-08-12
Attending: PSYCHIATRY & NEUROLOGY
Payer: COMMERCIAL

## 2025-08-12 DIAGNOSIS — F99 INSOMNIA DUE TO OTHER MENTAL DISORDER: ICD-10-CM

## 2025-08-12 DIAGNOSIS — F51.05 INSOMNIA DUE TO OTHER MENTAL DISORDER: ICD-10-CM

## 2025-08-12 DIAGNOSIS — F33.9 MAJOR DEPRESSIVE DISORDER, RECURRENT EPISODE WITH ANXIOUS DISTRESS: ICD-10-CM

## 2025-08-12 DIAGNOSIS — F90.0 ATTENTION DEFICIT HYPERACTIVITY DISORDER (ADHD), PREDOMINANTLY INATTENTIVE TYPE: Primary | ICD-10-CM

## 2025-08-12 PROCEDURE — G2211 COMPLEX E/M VISIT ADD ON: HCPCS | Mod: 95 | Performed by: PSYCHIATRY & NEUROLOGY

## 2025-08-12 PROCEDURE — 98006 SYNCH AUDIO-VIDEO EST MOD 30: CPT | Performed by: PSYCHIATRY & NEUROLOGY

## 2025-08-12 PROCEDURE — 1125F AMNT PAIN NOTED PAIN PRSNT: CPT | Mod: 95 | Performed by: PSYCHIATRY & NEUROLOGY

## 2025-08-12 RX ORDER — AMITRIPTYLINE HYDROCHLORIDE 50 MG/1
50 TABLET ORAL AT BEDTIME
Qty: 30 TABLET | Refills: 3 | Status: SHIPPED | OUTPATIENT
Start: 2025-08-12

## 2025-08-12 RX ORDER — CLONIDINE HYDROCHLORIDE 0.2 MG/1
0.2 TABLET ORAL AT BEDTIME
Qty: 30 TABLET | Refills: 3 | Status: SHIPPED | OUTPATIENT
Start: 2025-08-12

## 2025-08-12 RX ORDER — GABAPENTIN 300 MG/1
300 CAPSULE ORAL 3 TIMES DAILY
Qty: 120 CAPSULE | Refills: 3 | Status: SHIPPED | OUTPATIENT
Start: 2025-08-12

## 2025-08-12 RX ORDER — RAMELTEON 8 MG/1
8 TABLET ORAL AT BEDTIME
Qty: 30 TABLET | Refills: 3 | Status: SHIPPED | OUTPATIENT
Start: 2025-08-12

## 2025-08-12 RX ORDER — LISDEXAMFETAMINE DIMESYLATE 30 MG/1
30 CAPSULE ORAL EVERY MORNING
Qty: 30 CAPSULE | Refills: 0 | Status: SHIPPED | OUTPATIENT
Start: 2025-08-12

## 2025-08-12 RX ORDER — ESZOPICLONE 3 MG/1
3 TABLET, FILM COATED ORAL PRN
Qty: 30 TABLET | Refills: 3 | Status: SHIPPED | OUTPATIENT
Start: 2025-08-12

## 2025-08-12 ASSESSMENT — ANXIETY QUESTIONNAIRES
7. FEELING AFRAID AS IF SOMETHING AWFUL MIGHT HAPPEN: SEVERAL DAYS
4. TROUBLE RELAXING: NEARLY EVERY DAY
8. IF YOU CHECKED OFF ANY PROBLEMS, HOW DIFFICULT HAVE THESE MADE IT FOR YOU TO DO YOUR WORK, TAKE CARE OF THINGS AT HOME, OR GET ALONG WITH OTHER PEOPLE?: VERY DIFFICULT
5. BEING SO RESTLESS THAT IT IS HARD TO SIT STILL: SEVERAL DAYS
1. FEELING NERVOUS, ANXIOUS, OR ON EDGE: SEVERAL DAYS
IF YOU CHECKED OFF ANY PROBLEMS ON THIS QUESTIONNAIRE, HOW DIFFICULT HAVE THESE PROBLEMS MADE IT FOR YOU TO DO YOUR WORK, TAKE CARE OF THINGS AT HOME, OR GET ALONG WITH OTHER PEOPLE: VERY DIFFICULT
GAD7 TOTAL SCORE: 12
GAD7 TOTAL SCORE: 12
2. NOT BEING ABLE TO STOP OR CONTROL WORRYING: NEARLY EVERY DAY
7. FEELING AFRAID AS IF SOMETHING AWFUL MIGHT HAPPEN: SEVERAL DAYS
3. WORRYING TOO MUCH ABOUT DIFFERENT THINGS: SEVERAL DAYS
GAD7 TOTAL SCORE: 12
6. BECOMING EASILY ANNOYED OR IRRITABLE: MORE THAN HALF THE DAYS

## 2025-08-12 ASSESSMENT — PATIENT HEALTH QUESTIONNAIRE - PHQ9
10. IF YOU CHECKED OFF ANY PROBLEMS, HOW DIFFICULT HAVE THESE PROBLEMS MADE IT FOR YOU TO DO YOUR WORK, TAKE CARE OF THINGS AT HOME, OR GET ALONG WITH OTHER PEOPLE: EXTREMELY DIFFICULT
SUM OF ALL RESPONSES TO PHQ QUESTIONS 1-9: 12
SUM OF ALL RESPONSES TO PHQ QUESTIONS 1-9: 12

## 2025-08-12 ASSESSMENT — PAIN SCALES - GENERAL: PAINLEVEL_OUTOF10: MILD PAIN (3)

## 2025-08-13 ENCOUNTER — TELEPHONE (OUTPATIENT)
Dept: PSYCHOLOGY | Facility: CLINIC | Age: 40
End: 2025-08-13
Payer: COMMERCIAL